# Patient Record
Sex: FEMALE | Race: BLACK OR AFRICAN AMERICAN | NOT HISPANIC OR LATINO | Employment: FULL TIME | URBAN - METROPOLITAN AREA
[De-identification: names, ages, dates, MRNs, and addresses within clinical notes are randomized per-mention and may not be internally consistent; named-entity substitution may affect disease eponyms.]

---

## 2023-09-23 DIAGNOSIS — I48.91 ATRIAL FIBRILLATION, UNSPECIFIED TYPE (MULTI): Primary | ICD-10-CM

## 2023-09-23 LAB
INR IN PPP BY COAGULATION ASSAY EXTERNAL: 1.4
PROTHROMBIN TIME (PT) IN PPP BY COAGULATION ASSAY EXTERNAL: NORMAL SECONDS

## 2023-10-07 ENCOUNTER — ANTICOAGULATION - WARFARIN VISIT (OUTPATIENT)
Dept: CARDIOLOGY | Facility: CLINIC | Age: 43
End: 2023-10-07
Payer: MEDICAID

## 2023-10-07 DIAGNOSIS — I48.91 ATRIAL FIBRILLATION, UNSPECIFIED TYPE (MULTI): ICD-10-CM

## 2023-10-07 LAB
POC INR: 2.9
POC PROTHROMBIN TIME: NORMAL

## 2023-10-07 PROCEDURE — 99211 OFF/OP EST MAY X REQ PHY/QHP: CPT | Performed by: INTERNAL MEDICINE

## 2023-10-07 PROCEDURE — 85610 PROTHROMBIN TIME: CPT | Mod: QW | Performed by: INTERNAL MEDICINE

## 2023-10-07 PROCEDURE — 85610 PROTHROMBIN TIME: CPT

## 2023-11-04 ENCOUNTER — ANTICOAGULATION - WARFARIN VISIT (OUTPATIENT)
Dept: CARDIOLOGY | Facility: CLINIC | Age: 43
End: 2023-11-04
Payer: MEDICAID

## 2023-11-04 DIAGNOSIS — I48.91 ATRIAL FIBRILLATION, UNSPECIFIED TYPE (MULTI): ICD-10-CM

## 2023-11-04 LAB
POC INR: 2.4
POC PROTHROMBIN TIME: NORMAL

## 2023-11-04 PROCEDURE — 85610 PROTHROMBIN TIME: CPT | Mod: QW | Performed by: INTERNAL MEDICINE

## 2023-11-04 PROCEDURE — 99211 OFF/OP EST MAY X REQ PHY/QHP: CPT | Performed by: INTERNAL MEDICINE

## 2023-11-04 NOTE — PROGRESS NOTES
Patient identification verified with 2 identifiers.    Location: [facility] at [facility] [address] [address] [phone] option #1     Referring Physician: Vamshi  Enrollment/ Re-enrollment date: 3/9/24   INR Goal: 2.0-3.0  INR monitoring is per [facility] protocol.  Anticoagulation Medication: warfarin  Indication: Atrial Fibrillation/Atrial Flutter    Subjective   Bleeding signs/symptoms: No    Bruising: No   Major bleeding event: No  Thrombosis signs/symptoms: No  Thromboembolic event: No  Missed doses: No  Extra doses: No  Medication changes: No  Dietary changes: No  Change in health: No  Change in activity: No  Alcohol: No  Other concerns: No    Upcoming Surgeries:  Does the Patient Have any upcoming surgeries that require interruption in anticoagulation therapy? no  Does the patient require bridging? no      Anticoagulation Summary  As of 2023      INR goal:  2.0-3.0   TTR:  100.0 % (1.1 mo)   INR used for dosin.40 (2023)   Weekly warfarin total:  40 mg               Assessment/Plan   Therapeutic     1. New dose: no change    2. Next INR: 1 month      Education provided to patient during the visit:  Patient instructed to call in interim with questions, concerns and changes.

## 2023-12-02 ENCOUNTER — ANTICOAGULATION - WARFARIN VISIT (OUTPATIENT)
Dept: CARDIOLOGY | Facility: CLINIC | Age: 43
End: 2023-12-02
Payer: MEDICAID

## 2023-12-02 DIAGNOSIS — I48.91 ATRIAL FIBRILLATION, UNSPECIFIED TYPE (MULTI): ICD-10-CM

## 2023-12-02 LAB
POC INR: 2.8
POC PROTHROMBIN TIME: NORMAL

## 2023-12-02 PROCEDURE — 99211 OFF/OP EST MAY X REQ PHY/QHP: CPT | Performed by: INTERNAL MEDICINE

## 2023-12-02 PROCEDURE — 85610 PROTHROMBIN TIME: CPT | Mod: QW

## 2023-12-02 NOTE — PROGRESS NOTES
Patient identification verified with 2 identifiers.    Location: [facility] at [facility] [address] [address] [phone] option #1     Referring Physician: Vamshi  Enrollment/ Re-enrollment date: 3/9/24   INR Goal: 2.0-3.0  INR monitoring is per [facility] protocol.  Anticoagulation Medication: warfarin  Indication: Atrial Fibrillation/Atrial Flutter    Subjective   Bleeding signs/symptoms: No    Bruising: No   Major bleeding event: No  Thrombosis signs/symptoms: No  Thromboembolic event: No  Missed doses: No  Extra doses: No  Medication changes: No  Dietary changes: No  Change in health: No  Change in activity: No  Alcohol: No  Other concerns: No    Upcoming Surgeries:  Does the Patient Have any upcoming surgeries that require interruption in anticoagulation therapy? no  Does the patient require bridging? no      Anticoagulation Summary  As of 2023      INR goal:  2.0-3.0   TTR:  100.0 % (2 mo)   INR used for dosin.80 (2023)   Weekly warfarin total:  40 mg               Assessment/Plan   Therapeutic     1. New dose: no change    2. Next INR: 1 month      Education provided to patient during the visit:  Patient instructed to call in interim with questions, concerns and changes.

## 2023-12-11 PROBLEM — B96.89 BV (BACTERIAL VAGINOSIS): Status: ACTIVE | Noted: 2023-12-11

## 2023-12-11 PROBLEM — R06.09 DYSPNEA ON EXERTION: Status: ACTIVE | Noted: 2022-02-19

## 2023-12-11 PROBLEM — N76.0 BV (BACTERIAL VAGINOSIS): Status: ACTIVE | Noted: 2023-12-11

## 2023-12-11 PROBLEM — R05.9 COUGH: Status: ACTIVE | Noted: 2022-02-19

## 2023-12-11 PROBLEM — M25.551 HIP PAIN, BILATERAL: Status: ACTIVE | Noted: 2023-12-11

## 2023-12-11 PROBLEM — I21.9 MI (MYOCARDIAL INFARCTION) (MULTI): Status: ACTIVE | Noted: 2023-12-11

## 2023-12-11 PROBLEM — E78.5 HYPERLIPIDEMIA: Status: ACTIVE | Noted: 2023-12-11

## 2023-12-11 PROBLEM — I25.2 HISTORY OF ST ELEVATION MYOCARDIAL INFARCTION (STEMI): Status: ACTIVE | Noted: 2023-12-11

## 2023-12-11 PROBLEM — A59.9 TRICHOMONIASIS: Status: ACTIVE | Noted: 2022-02-19

## 2023-12-11 PROBLEM — I47.20 VENTRICULAR TACHYCARDIA, PAROXYSMAL: Status: ACTIVE | Noted: 2023-12-11

## 2023-12-11 PROBLEM — J45.909 ASTHMA COMPLICATING PREGNANCY, ANTEPARTUM (HHS-HCC): Status: ACTIVE | Noted: 2023-12-11

## 2023-12-11 PROBLEM — E78.41 ELEVATED LIPOPROTEIN A LEVEL: Status: ACTIVE | Noted: 2023-12-11

## 2023-12-11 PROBLEM — I82.90 THROMBUS: Status: ACTIVE | Noted: 2022-02-19

## 2023-12-11 PROBLEM — M25.552 HIP PAIN, BILATERAL: Status: ACTIVE | Noted: 2023-12-11

## 2023-12-11 PROBLEM — K21.9 GASTROESOPHAGEAL REFLUX DISEASE: Status: ACTIVE | Noted: 2022-02-19

## 2023-12-11 PROBLEM — L73.2 HIDRADENITIS SUPPURATIVA: Status: ACTIVE | Noted: 2019-04-19

## 2023-12-11 PROBLEM — J30.9 CHRONIC ALLERGIC RHINITIS: Status: ACTIVE | Noted: 2023-12-11

## 2023-12-11 PROBLEM — J45.909 ASTHMA (HHS-HCC): Status: ACTIVE | Noted: 2023-12-11

## 2023-12-11 PROBLEM — Z95.810 CARDIAC DEFIBRILLATOR IN PLACE: Status: ACTIVE | Noted: 2023-12-11

## 2023-12-11 PROBLEM — I51.3 LEFT VENTRICULAR THROMBUS: Status: ACTIVE | Noted: 2023-12-11

## 2023-12-11 PROBLEM — I42.9 CARDIOMYOPATHY (MULTI): Chronic | Status: ACTIVE | Noted: 2022-02-19

## 2023-12-11 PROBLEM — I25.10 CAD (CORONARY ARTERY DISEASE): Status: ACTIVE | Noted: 2023-12-11

## 2023-12-11 PROBLEM — R06.83 SNORING: Status: ACTIVE | Noted: 2023-12-11

## 2023-12-11 PROBLEM — J20.9 ACUTE BRONCHITIS: Status: ACTIVE | Noted: 2023-12-11

## 2023-12-11 PROBLEM — R93.89 ABNORMAL COMPUTERIZED AXIAL TOMOGRAPHY OF CHEST: Status: ACTIVE | Noted: 2022-02-19

## 2023-12-11 PROBLEM — O99.519 ASTHMA COMPLICATING PREGNANCY, ANTEPARTUM (HHS-HCC): Status: ACTIVE | Noted: 2023-12-11

## 2023-12-11 PROBLEM — I47.29 VENTRICULAR TACHYCARDIA, PAROXYSMAL (MULTI): Status: ACTIVE | Noted: 2023-12-11

## 2023-12-11 PROBLEM — J18.9 PNEUMONIA: Status: ACTIVE | Noted: 2023-12-11

## 2023-12-11 PROBLEM — I50.22 CHRONIC SYSTOLIC CONGESTIVE HEART FAILURE (MULTI): Status: ACTIVE | Noted: 2023-12-11

## 2023-12-11 PROBLEM — N80.9 ENDOMETRIOSIS: Status: ACTIVE | Noted: 2022-02-19

## 2023-12-11 PROBLEM — I24.0: Status: ACTIVE | Noted: 2023-12-11

## 2023-12-11 PROBLEM — R06.02 SOB (SHORTNESS OF BREATH) ON EXERTION: Status: ACTIVE | Noted: 2023-12-11

## 2023-12-11 PROBLEM — M25.579 ANKLE PAIN: Status: ACTIVE | Noted: 2023-12-11

## 2023-12-11 PROBLEM — R07.9 CHEST PAIN: Status: ACTIVE | Noted: 2022-02-19

## 2023-12-11 PROBLEM — M54.9 BACK PAIN: Status: ACTIVE | Noted: 2022-02-19

## 2023-12-11 PROBLEM — L93.0 DISCOID LUPUS ERYTHEMATOSUS: Status: ACTIVE | Noted: 2019-04-19

## 2023-12-11 PROBLEM — N89.8 VAGINAL ITCHING: Status: ACTIVE | Noted: 2023-12-11

## 2023-12-11 PROBLEM — M25.559 ARTHRALGIA OF HIP: Status: ACTIVE | Noted: 2022-02-19

## 2023-12-11 PROBLEM — I31.9 PERICARDITIS (HHS-HCC): Status: ACTIVE | Noted: 2023-12-11

## 2023-12-11 PROBLEM — U07.1 DISEASE DUE TO SEVERE ACUTE RESPIRATORY SYNDROME CORONAVIRUS 2 (SARS-COV-2): Status: ACTIVE | Noted: 2022-02-19

## 2023-12-11 RX ORDER — DESIPRAMINE HYDROCHLORIDE 10 MG/1
10 TABLET ORAL DAILY
COMMUNITY

## 2023-12-11 RX ORDER — POTASSIUM CHLORIDE 750 MG/1
10 TABLET, FILM COATED, EXTENDED RELEASE ORAL AS NEEDED
COMMUNITY

## 2023-12-11 RX ORDER — METOPROLOL SUCCINATE 200 MG/1
200 TABLET, EXTENDED RELEASE ORAL DAILY
COMMUNITY

## 2023-12-11 RX ORDER — ALBUTEROL SULFATE 90 UG/1
AEROSOL, METERED RESPIRATORY (INHALATION) EVERY 6 HOURS PRN
COMMUNITY
Start: 2019-05-16

## 2023-12-11 RX ORDER — DAPAGLIFLOZIN 10 MG/1
10 TABLET, FILM COATED ORAL DAILY
COMMUNITY

## 2023-12-11 RX ORDER — CLINDAMYCIN PHOSPHATE 10 UG/ML
LOTION TOPICAL
COMMUNITY
Start: 2019-04-19

## 2023-12-11 RX ORDER — ROSUVASTATIN CALCIUM 40 MG/1
40 TABLET, COATED ORAL DAILY
COMMUNITY

## 2023-12-11 RX ORDER — WARFARIN 7.5 MG/1
7.5 TABLET ORAL
COMMUNITY

## 2023-12-11 RX ORDER — SPIRONOLACTONE 25 MG/1
0.5 TABLET ORAL DAILY
COMMUNITY
Start: 2020-11-25

## 2023-12-11 RX ORDER — FUROSEMIDE 40 MG/1
40 TABLET ORAL AS NEEDED
COMMUNITY

## 2023-12-11 RX ORDER — WARFARIN SODIUM 5 MG/1
TABLET ORAL
COMMUNITY

## 2023-12-11 RX ORDER — CLOPIDOGREL BISULFATE 75 MG/1
75 TABLET ORAL DAILY
COMMUNITY

## 2023-12-11 RX ORDER — PANTOPRAZOLE SODIUM 40 MG/1
40 TABLET, DELAYED RELEASE ORAL DAILY
COMMUNITY
End: 2024-05-21 | Stop reason: ALTCHOICE

## 2023-12-11 RX ORDER — SERTRALINE HYDROCHLORIDE 100 MG/1
100 TABLET, FILM COATED ORAL DAILY
COMMUNITY

## 2023-12-11 RX ORDER — SACUBITRIL AND VALSARTAN 97; 103 MG/1; MG/1
1 TABLET, FILM COATED ORAL 2 TIMES DAILY
COMMUNITY
Start: 2021-06-18 | End: 2024-04-17 | Stop reason: SDUPTHER

## 2023-12-11 RX ORDER — HYDROXYCHLOROQUINE SULFATE 200 MG/1
TABLET, FILM COATED ORAL 2 TIMES DAILY
COMMUNITY
Start: 2019-05-24 | End: 2024-05-21 | Stop reason: WASHOUT

## 2023-12-11 RX ORDER — MONTELUKAST SODIUM 10 MG/1
10 TABLET ORAL DAILY
COMMUNITY

## 2023-12-11 RX ORDER — DEXTROMETHORPHAN HYDROBROMIDE AND GUAIFENESIN 10; 200 MG/1; MG/1
CAPSULE, GELATIN COATED ORAL
COMMUNITY
Start: 2022-12-29 | End: 2023-12-12 | Stop reason: ALTCHOICE

## 2023-12-11 RX ORDER — GABAPENTIN 100 MG/1
100 CAPSULE ORAL 2 TIMES DAILY
COMMUNITY
Start: 2023-10-26

## 2023-12-11 RX ORDER — DULOXETIN HYDROCHLORIDE 30 MG/1
30 CAPSULE, DELAYED RELEASE ORAL 2 TIMES DAILY
COMMUNITY

## 2023-12-11 RX ORDER — BUDESONIDE AND FORMOTEROL FUMARATE DIHYDRATE 160; 4.5 UG/1; UG/1
AEROSOL RESPIRATORY (INHALATION) 2 TIMES DAILY
COMMUNITY
Start: 2020-05-07

## 2023-12-12 ENCOUNTER — OFFICE VISIT (OUTPATIENT)
Dept: CARDIOLOGY | Facility: HOSPITAL | Age: 43
End: 2023-12-12
Payer: MEDICAID

## 2023-12-12 VITALS
HEART RATE: 76 BPM | WEIGHT: 185 LBS | HEIGHT: 64 IN | BODY MASS INDEX: 31.58 KG/M2 | OXYGEN SATURATION: 96 % | SYSTOLIC BLOOD PRESSURE: 106 MMHG | DIASTOLIC BLOOD PRESSURE: 72 MMHG

## 2023-12-12 DIAGNOSIS — E78.00 PURE HYPERCHOLESTEROLEMIA: ICD-10-CM

## 2023-12-12 DIAGNOSIS — I50.22 CHRONIC SYSTOLIC CONGESTIVE HEART FAILURE (MULTI): ICD-10-CM

## 2023-12-12 DIAGNOSIS — I48.91 ATRIAL FIBRILLATION, UNSPECIFIED TYPE (MULTI): ICD-10-CM

## 2023-12-12 DIAGNOSIS — I21.02 ST ELEVATION MYOCARDIAL INFARCTION INVOLVING LEFT ANTERIOR DESCENDING (LAD) CORONARY ARTERY (MULTI): ICD-10-CM

## 2023-12-12 DIAGNOSIS — I42.9 CARDIOMYOPATHY, UNSPECIFIED TYPE (MULTI): Primary | Chronic | ICD-10-CM

## 2023-12-12 DIAGNOSIS — I24.0 CORONARY THROMBOSIS (MULTI): ICD-10-CM

## 2023-12-12 PROCEDURE — 3074F SYST BP LT 130 MM HG: CPT | Performed by: INTERNAL MEDICINE

## 2023-12-12 PROCEDURE — 99214 OFFICE O/P EST MOD 30 MIN: CPT | Performed by: INTERNAL MEDICINE

## 2023-12-12 PROCEDURE — 3044F HG A1C LEVEL LT 7.0%: CPT | Performed by: INTERNAL MEDICINE

## 2023-12-12 PROCEDURE — 1036F TOBACCO NON-USER: CPT | Performed by: INTERNAL MEDICINE

## 2023-12-12 PROCEDURE — 3078F DIAST BP <80 MM HG: CPT | Performed by: INTERNAL MEDICINE

## 2023-12-12 ASSESSMENT — ENCOUNTER SYMPTOMS
OCCASIONAL FEELINGS OF UNSTEADINESS: 0
LOSS OF SENSATION IN FEET: 0
DEPRESSION: 0

## 2023-12-12 NOTE — PATIENT INSTRUCTIONS
Check CMP, CBC, lipid profile, and A1c ordered by your primary physician.  Continue to exercise and take your medications.  Follow-up in 4 months with an echo.

## 2023-12-12 NOTE — PROGRESS NOTES
Primary Care Physician: Arminda Thompson MD  Date of Visit: 2023  3:40 PM EST  Location of visit: [facility]     Chief Complaint:   Chief Complaint   Patient presents with    Cardiomyopathy    Coronary Artery Disease    HxStemi        HPI / Summary:   Jayde Daugherty is a 43 y.o. female presents for followup.  She has no cardiac complaints.  She was exercising for 45 minutes 5 days a week without chest pain or shortness of breath.  She does note that she will get constant dull aching chest discomfort when she does not take her Cymbalta.  The discomfort is distinctly different than her prior angina.  She has been taking her Cymbalta regularly and she has not had any chest discomfort recently.  When she was exercising for 45 minutes a day she would not experience chest discomfort.  The patient denies chest pain, shortness of breath, palpitations, lightheadedness, syncope, orthopnea, paroxysmal nocturnal dyspnea, lower extremity edema, or bleeding problems.          Past Medical History:   Diagnosis Date    Encounter for full-term uncomplicated delivery      (spontaneous vaginal delivery)    Other conditions influencing health status     BMI (body mass index) 20.0-29.9    Other conditions influencing health status 06/10/2014    History of pregnancy    Other specified diseases and conditions complicating pregnancy 2019    Systemic lupus complicating pregnancy    Personal history of nicotine dependence 06/10/2019    Former cigarette smoker    Personal history of other diseases of the circulatory system 2014    Personal history of congestive heart failure    Personal history of other diseases of the circulatory system 2014    History of acute congestive heart failure    Personal history of other diseases of the circulatory system 2014    History of atrial fibrillation    Personal history of other diseases of the female genital tract     History of pelvic inflammatory  disease    Personal history of other diseases of the respiratory system 2022    History of acute bronchitis    Personal history of other specified conditions     History of abnormal Pap smear    Systemic lupus erythematosus, unspecified (CMS/[facility]) 2013    Systemic lupus erythematosus    Type A blood, Rh positive     Blood type A+        Past Surgical History:   Procedure Laterality Date    CERVICAL BIOPSY  W/ LOOP ELECTRODE EXCISION  2014    Cervical Loop Electrosurgical Excision (LEEP)     SECTION, CLASSIC  2014     Section    HYSTERECTOMY  2016    Hysterectomy    MR HEAD ANGIO WO IV CONTRAST  2013    MR HEAD ANGIO WO IV CONTRAST 2013 [facility]    MR NECK ANGIO WO IV CONTRAST  2013    MR NECK ANGIO WO IV CONTRAST 2013 [facility]    OTHER SURGICAL HISTORY  2019    Cardioverter defibrillator insertion    TOTAL HIP ARTHROPLASTY  2014    Hip Replacement          Social History:   reports that she quit smoking about 7 years ago. Her smoking use included cigarettes. She has a 25.00 pack-year smoking history. She has never used smokeless tobacco. She reports current alcohol use of about 3.0 standard drinks of alcohol per week. She reports that she does not currently use drugs.     Family History:  family history is not on file.      Allergies:  No Known Allergies    Outpatient Medications:  Current Outpatient Medications   Medication Instructions    albuterol 90 mcg/actuation inhaler inhalation, Every 6 hours PRN    budesonide-formoteroL (Symbicort) 160-4.5 mcg/actuation inhaler inhalation, 2 times daily    clindamycin (Cleocin T) 1 % lotion 1 Application    clopidogrel (PLAVIX) 75 mg, oral, Daily    desipramine (NORPRAMIN) 10 mg, oral, Daily    DULoxetine (CYMBALTA) 30 mg, oral, 2 times daily    Farxiga 10 mg, oral, Daily    furosemide (LASIX) 40 mg, oral, Daily    gabapentin (NEURONTIN) 100 mg, oral, 2 times daily     "hydroxychloroquine (Plaquenil) 200 mg tablet oral, 2 times daily    insulin NPH, Isophane, (HumuLIN N,NovoLIN N) 100 unit/mL injection subcutaneous    metoprolol succinate XL (TOPROL-XL) 200 mg, oral, Daily    montelukast (SINGULAIR) 10 mg, oral, Daily    pantoprazole (PROTONIX) 40 mg, oral, Daily    potassium chloride CR 10 mEq ER tablet oral    rosuvastatin (CRESTOR) 40 mg, oral, Daily    sacubitriL-valsartan (Entresto)  mg tablet 1 tablet, oral, 2 times daily    sertraline (ZOLOFT) 100 mg, oral, Daily    spironolactone (Aldactone) 25 mg tablet oral    warfarin (Coumadin) 5 mg tablet TAKE 1 TABLET BY MOUTH ONCE DAILY EXCEPT TAKE 1 AND 1/2 TABLETS BY MOUTH ON MONDAY    warfarin (COUMADIN) 7.5 mg, oral       Physical Exam:  Vitals:    12/12/23 1622   BP: 106/72   BP Location: Right arm   Pulse: 76   SpO2: 96%   Weight: 83.9 kg (185 lb)   Height: 1.626 m (5' 4\")     Wt Readings from Last 5 Encounters:   12/12/23 83.9 kg (185 lb)   05/24/23 83 kg (183 lb)   02/22/23 82.1 kg (181 lb)   01/18/23 83 kg (183 lb)   01/04/23 82.6 kg (182 lb 0.8 oz)     Body mass index is 31.76 kg/m².   General: Well-developed well-nourished in no acute distress  HEENT: Normocephalic atraumatic  Neck: Supple, JVP is normal negative hepatojugular reflux 2+ carotid pulses without bruit  Pulmonary: Normal respiratory effort, clear to auscultation  Cardiovascular: No right ventricular heave, normal S1 and S2, no murmurs rubs or gallops  Abdomen: Soft nontender nondistended  Extremities: Warm without edema 2+ radial pulses bilaterally   Neurologic: Alert and oriented x3  Psychiatric: Normal mood and affect     Last Labs:  CMP:  Recent Labs     02/14/23  1113 02/07/23  1023 01/09/23  0743    136 136   K 3.8 3.6 4.2    101 106   CO2 27 26 25   ANIONGAP 13 13 9*   BUN 8 12 8   CREATININE 0.97 0.78 0.74   GLUCOSE 153* 91 109*     Recent Labs     02/14/23  1113 02/07/23  1023 01/05/23  0603   ALBUMIN 3.6 4.0 3.6   ALKPHOS 40 38 44 "   ALT 21 16 20   AST 14 13 17   BILITOT 0.4 0.7 0.5     CBC:  Recent Labs     02/15/23  0506 02/14/23  1113 02/07/23  1023   WBC 5.0 4.8 3.3*   HGB 11.7* 13.5 13.8   HCT 36.5 40.9 43.2    230 185   MCV 93 91 92     COAG:   Recent Labs     12/02/23  1430 11/04/23  1455 09/23/23  0000 02/15/23  1057 01/05/23  0603 01/04/23  1907   INR 2.80 2.40   < >  --    < > 2.4*   DDIMERVTE  --   --   --  459  --  484    < > = values in this interval not displayed.     HEME/ENDO:  Recent Labs     02/15/23  0506 09/29/22  1628 12/21/21  1137 12/17/21  1539 12/22/20  1500   FERRITIN  --   --   --  321*  --    TSH  --  0.82 1.55  --  1.41   HGBA1C 6.5* 6.2* 9.0*  --  6.0      CARDIAC:   Recent Labs     02/14/23  1254 02/14/23  1113 01/06/23  1622 01/05/23  0603 01/04/23  1907   TROPHS 8 8  --  25* 32*   BNP  --  170* 266*  --  594*     Recent Labs     02/15/23  0506 09/29/22  1628 09/19/22  1013   CHOL 170 276* 204*   LDLF 97 174* 128*   HDL 47.6 68.3 58.8   TRIG 129 170* 88       Last Cardiology Tests:  ECG:  An electrocardiogram performed today that I reviewed shows normal sinus rhythm left anterior fascicular block prior anterior lateral infarct ST-T abnormality consider inferior lateral ischemia.    Echo:  January 4, 2023  CONCLUSIONS:   1. Left ventricular systolic function is severely decreased with a 20% estimated ejection fraction.   2. Spectral Doppler shows an abnormal pattern of left ventricular diastolic filling.   3. The left atrium is severely dilated.   4. Mild to moderate mitral valve regurgitation.   5. Mildly elevated RVSP.   6. Left ventricular cavity size is moderately dilated.   7. There is global hypokinesis of the left ventricle with minor regional variations.       Cath:  Right heart catheterization January 6, 2023  CONCLUSIONS:   1. Normal left and right heart filling pressures.   2. No evidence of pulmonary hypertension.   3. Low normal cardiac index with elevated SVR (1498).   4. No evidence of  intracardiac shunt.    Cardiac catheterization September 19, 2022  CONCLUSIONS:   1. No significant CAD in a codominant system.    Cardiac catheterization June 2, 2019  Coronary Lesion Summary:  Vessel   Stenosis    Vessel Segment  LAD    100% stenosis proximal to mid  CONCLUSIONS:   1. Successful OCT guided PCI of the distal left main and proximal LAD with mechanical thrombectomy.   2. 100% thormbotic occlusion of proximal-mid LAD with large thrombus burden in distal LM and proximal LAD in a codominant system.   3. Likely embolic vs. insitu thrombosis with otherwise angiographically normal coronary arteries.   4. Elevated LVEDP.   5. No aortic stenosis.   6. Left Ventricular end-diastolic pressure = 35.    Stress Test:  Stress Results:  No results found for this or any previous visit from the past 365 days.         Cardiac Imaging:  CT angio of the chest January 4, 2023  FINDINGS:     Pulmonary arteries are adequately opacified without acute or chronic  filling defects.       The heart is normal in size without pericardial effusion.       Thoracic lymph nodes are not enlarged.     There is no pleural effusion, pleural thickening, or pneumothorax.           Assessment/Plan   Diagnoses and all orders for this visit:  Cardiomyopathy, unspecified type (CMS/HCC)  -     Transthoracic echo (TTE) complete; Future  -     perflutren lipid microspheres (Definity) injection 0.5-10 mL of dilution  -     sulfur hexafluoride microsphr (Lumason) injection 24.28 mg  -     perflutren protein A microsphere (Optison) injection 0.5 mL  Atrial fibrillation, unspecified type (CMS/HCC)  Coronary thrombosis (CMS/HCC)  Chronic systolic congestive heart failure (CMS/HCC)  -     Transthoracic echo (TTE) complete; Future  -     perflutren lipid microspheres (Definity) injection 0.5-10 mL of dilution  -     sulfur hexafluoride microsphr (Lumason) injection 24.28 mg  -     perflutren protein A microsphere (Optison) injection 0.5 mL  Pure  hypercholesterolemia  ST elevation myocardial infarction involving left anterior descending (LAD) coronary artery (CMS/HCC)    In summary Ms. Gm Martinez is a pleasant 43 year-old -American female with a past medical history significant for anterior ST elevation myocardial infarction in the setting of massive thrombus (embolic versus in situ thrombosis) involving the distal left main and LAD with residual ischemic cardiomyopathy ejection fraction 30-35% status post ICD, paroxysmal atrial fibrillation, hypertension, hyperlipidemia, type II insulin-requiring diabetes while on steroids, lupus, LV thrombus on Warfarin, and prior history of recurrent peripartum cardiomyopathy.  She is relatively asymptomatic.  She does note sporadic chest discomfort that is noncardiac in nature when she has been off of Cymbalta.  She appears euvolemic on exam.  She should continue her current cardiovascular medications.  She will get labs today ordered by her primary physician.  We will see her back in follow-up in 4 months with an echo for surveillance of her LV function.      Orders:  Orders Placed This Encounter   Procedures    Transthoracic echo (TTE) complete      Followup Appts:  Future Appointments   Date Time Provider Department Center   12/30/2023  2:15 PM [id number] [address] [id number]   4/8/2024  4:15 PM Essie White DPM [id number]           ____________________________________________________________  Migue Bonds MD  [facility] [place]  [facility]

## 2023-12-30 ENCOUNTER — ANTICOAGULATION - WARFARIN VISIT (OUTPATIENT)
Dept: CARDIOLOGY | Facility: CLINIC | Age: 43
End: 2023-12-30
Payer: MEDICAID

## 2023-12-30 DIAGNOSIS — I48.91 ATRIAL FIBRILLATION, UNSPECIFIED TYPE (MULTI): ICD-10-CM

## 2023-12-30 LAB
POC INR: 3.2
POC PROTHROMBIN TIME: NORMAL

## 2023-12-30 PROCEDURE — 85610 PROTHROMBIN TIME: CPT | Mod: QW

## 2023-12-30 PROCEDURE — 99211 OFF/OP EST MAY X REQ PHY/QHP: CPT | Performed by: INTERNAL MEDICINE

## 2023-12-30 NOTE — PROGRESS NOTES
Patient identification verified with 2 identifiers.    Location: [facility] at [facility] - [address] [address] [phone] option #1     Referring Physician: Vamshi  Enrollment/ Re-enrollment date: 3/9/24   INR Goal: 2.0-3.0  INR monitoring is per [facility] protocol.  Anticoagulation Medication: warfarin  Indication: Atrial Fibrillation/Atrial Flutter    Subjective   Bleeding signs/symptoms: No    Bruising: No   Major bleeding event: No  Thrombosis signs/symptoms: No  Thromboembolic event: No  Missed doses: Yes  Extra doses: No  Medication changes: No  Dietary changes: No  Change in health: No  Change in activity: No  Alcohol: No  Other concerns: No    Upcoming Surgeries:  Does the Patient Have any upcoming surgeries that require interruption in anticoagulation therapy? no  Does the patient require bridging? no      Anticoagulation Summary  As of 12/30/2023      INR goal:  2.0-3.0   TTR:  84.2 % (2.9 mo)   INR used for dosing:  3.20 (12/30/2023)   Weekly warfarin total:  40 mg               Assessment/Plan   Supra therapeutic    1. New dose: no change  as pt. Missed last night's dose of warfarin  2. Next INR: 2 weeks as patient needs a Saturday appointment      Education provided to patient during the visit:  Patient instructed to call in interim with questions, concerns and changes.

## 2024-01-13 ENCOUNTER — ANTICOAGULATION - WARFARIN VISIT (OUTPATIENT)
Dept: CARDIOLOGY | Facility: CLINIC | Age: 44
End: 2024-01-13
Payer: MEDICAID

## 2024-01-13 DIAGNOSIS — I48.91 ATRIAL FIBRILLATION, UNSPECIFIED TYPE (MULTI): ICD-10-CM

## 2024-01-13 LAB
POC INR: 2
POC PROTHROMBIN TIME: NORMAL

## 2024-01-13 PROCEDURE — 85610 PROTHROMBIN TIME: CPT | Mod: QW

## 2024-01-13 PROCEDURE — 99211 OFF/OP EST MAY X REQ PHY/QHP: CPT | Performed by: INTERNAL MEDICINE

## 2024-01-13 NOTE — PROGRESS NOTES
Patient identification verified with 2 identifiers.    Location: [facility] [address] [address] [phone] option #1     Referring Physician: Vamshi  Enrollment/ Re-enrollment date: 3/9/24   INR Goal: 2.0-3.0  INR monitoring is per [facility] protocol.  Anticoagulation Medication: warfarin  Indication: Atrial Fibrillation/Atrial Flutter    Subjective   Bleeding signs/symptoms: No    Bruising: No   Major bleeding event: No  Thrombosis signs/symptoms: No  Thromboembolic event: No  Missed doses: No  Extra doses: No  Medication changes: No  Dietary changes: No  Change in health: No  Change in activity: No  Alcohol: No  Other concerns: No    Upcoming Surgeries:  Does the Patient Have any upcoming surgeries that require interruption in anticoagulation therapy? no  Does the patient require bridging? no      Anticoagulation Summary  As of 2024      INR goal:  2.0-3.0   TTR:  84.1 % (3.4 mo)   INR used for dosin.00 (2024)   Weekly warfarin total:  40 mg               Assessment/Plan   therapeutic    1. New dose: no change   2. Next INR: 4 weeks as patient needs a Saturday appointment      Education provided to patient during the visit:  Patient instructed to call in interim with questions, concerns and changes.

## 2024-02-07 ENCOUNTER — HOSPITAL ENCOUNTER (OUTPATIENT)
Dept: RADIOLOGY | Facility: CLINIC | Age: 44
Discharge: HOME | End: 2024-02-07
Payer: MEDICAID

## 2024-02-07 DIAGNOSIS — Z12.31 SCREENING MAMMOGRAM FOR BREAST CANCER: ICD-10-CM

## 2024-02-07 PROCEDURE — 77063 BREAST TOMOSYNTHESIS BI: CPT | Performed by: RADIOLOGY

## 2024-02-07 PROCEDURE — 77067 SCR MAMMO BI INCL CAD: CPT

## 2024-02-07 PROCEDURE — 77067 SCR MAMMO BI INCL CAD: CPT | Performed by: RADIOLOGY

## 2024-02-10 ENCOUNTER — ANTICOAGULATION - WARFARIN VISIT (OUTPATIENT)
Dept: CARDIOLOGY | Facility: CLINIC | Age: 44
End: 2024-02-10
Payer: MEDICAID

## 2024-02-10 ENCOUNTER — TELEPHONE (OUTPATIENT)
Dept: CARDIOLOGY | Facility: CLINIC | Age: 44
End: 2024-02-10

## 2024-02-10 DIAGNOSIS — I48.91 ATRIAL FIBRILLATION, UNSPECIFIED TYPE (MULTI): ICD-10-CM

## 2024-02-13 ENCOUNTER — ANTICOAGULATION - WARFARIN VISIT (OUTPATIENT)
Dept: CARDIOLOGY | Facility: CLINIC | Age: 44
End: 2024-02-13
Payer: MEDICAID

## 2024-02-13 DIAGNOSIS — I48.91 ATRIAL FIBRILLATION, UNSPECIFIED TYPE (MULTI): ICD-10-CM

## 2024-02-24 ENCOUNTER — ANTICOAGULATION - WARFARIN VISIT (OUTPATIENT)
Dept: CARDIOLOGY | Facility: CLINIC | Age: 44
End: 2024-02-24
Payer: MEDICAID

## 2024-02-24 DIAGNOSIS — I48.0 PAROXYSMAL ATRIAL FIBRILLATION (MULTI): Primary | ICD-10-CM

## 2024-02-24 DIAGNOSIS — I48.91 ATRIAL FIBRILLATION, UNSPECIFIED TYPE (MULTI): ICD-10-CM

## 2024-02-24 LAB
POC INR: 2.8
POC PROTHROMBIN TIME: NORMAL

## 2024-02-24 PROCEDURE — 85610 PROTHROMBIN TIME: CPT | Mod: QW

## 2024-02-24 PROCEDURE — 99211 OFF/OP EST MAY X REQ PHY/QHP: CPT | Performed by: INTERNAL MEDICINE

## 2024-02-24 NOTE — PROGRESS NOTES
Patient identification verified with 2 identifiers.    Location: [facility] [address] [address] [phone] option #1     Referring Physician: Vamshi  Enrollment/ Re-enrollment date: 3/9/24   INR Goal: 2.0-3.0  INR monitoring is per [facility] protocol.  Anticoagulation Medication: warfarin  Indication: Atrial Fibrillation/Atrial Flutter    Subjective   Bleeding signs/symptoms: No    Bruising: No   Major bleeding event: No  Thrombosis signs/symptoms: No  Thromboembolic event: No  Missed doses: No  Extra doses: No  Medication changes: No  Dietary changes: No  Change in health: No  Change in activity: No  Alcohol: No  Other concerns: No    Upcoming Surgeries:  Does the Patient Have any upcoming surgeries that require interruption in anticoagulation therapy? no  Does the patient require bridging? no      Anticoagulation Summary  As of 2024      INR goal:  2.0-3.0   TTR:  88.7 % (4.8 mo)   INR used for dosin.80 (2024)   Weekly warfarin total:  40 mg               Assessment/Plan   therapeutic    1. New dose: no change   2. Next INR: 4 weeks as patient needs a Saturday appointment      Education provided to patient during the visit:  Patient instructed to call in interim with questions, concerns and changes.

## 2024-02-26 PROBLEM — I48.0 PAROXYSMAL ATRIAL FIBRILLATION (MULTI): Status: ACTIVE | Noted: 2024-02-26

## 2024-03-23 ENCOUNTER — ANTICOAGULATION - WARFARIN VISIT (OUTPATIENT)
Dept: CARDIOLOGY | Facility: CLINIC | Age: 44
End: 2024-03-23
Payer: MEDICAID

## 2024-03-23 DIAGNOSIS — I48.0 PAROXYSMAL ATRIAL FIBRILLATION (MULTI): ICD-10-CM

## 2024-03-23 LAB
POC INR: 5.4
POC PROTHROMBIN TIME: NORMAL

## 2024-03-23 PROCEDURE — 99211 OFF/OP EST MAY X REQ PHY/QHP: CPT

## 2024-03-23 PROCEDURE — 85610 PROTHROMBIN TIME: CPT | Mod: QW

## 2024-03-23 NOTE — PROGRESS NOTES
Patient identification verified with 2 identifiers.    Location: [facility] at [facility] - [address] [address] [phone] option #1     Referring Physician: DR.WILLIAM MCDANIEL  Enrollment/ Re-enrollment date: 2025   INR Goal: 2.0-3.0  INR monitoring is per [facility] protocol.  Anticoagulation Medication: warfarin  Indication: Atrial Fibrillation/Atrial Flutter    Subjective   Bleeding signs/symptoms: No  PT STATES SHE HAD A NOSE BLEED THAT SHE WAS ABLE TO STOP.    Bruising: No   Major bleeding event: No  Thrombosis signs/symptoms: No  Thromboembolic event: No  Missed doses: No  Extra doses: No  Medication changes: No  Dietary changes: Yes  PT ATE SOME CRANBERRIES IN TRAIL MIX  Change in health: No  Change in activity: No  Alcohol: No  Other concerns: No    Upcoming Procedures:  Does the Patient Have any upcoming procedures that require interruption in anticoagulation therapy? no  Does the patient require bridging? no      Anticoagulation Summary  As of 3/23/2024      INR goal:  2.0-3.0   TTR:  75.8 % (5.7 mo)   INR used for dosin.40 (3/23/2024)   Weekly warfarin total:  40 mg               Assessment/Plan   Supratherapeutic     1. New dose:      PT HAS BEEN EATING CRANBERRIES IN TRAIL MIX. SHE WILL STOP. PT ALSO REPORTS A NOSEBLEED WHICH SHE WAS ABLE TO STOP. PT HAS BEEN ON THIS DOSE OF WARFARIN FOR MULTIPLE MONTHS AND MOSTLY THERAPEUTIC. CONTACTED DR. MCDANIEL, WITH PLAN TO HOLD WARFARIN X 2 DAYS REQUESTED HE CONTACT PT IF HE WOULD LIKE A DIFFERENT POC. PT WILL EAT SOME VIT K FOR THE NEXT 2 DAYS. PT IS UNABLE TO RTC UNTIL 3/29/24 DUE TO HER WORK SCHEDULE. PT ADVISED TO GO TO ER IF SHE HAS ANY FURTHER BLEEDING ISSUES. PT ALSO REPORTED HAVING SOME LIGHTHEADEDNESS WHICH I ALSO ADVISED HER TO CONTACT DR. MCDANIEL OR GO TO ER IF IT CONTINUES. PT VERBALIZED UNDERSTANDING OF ALL THE ABOVE.  2. Next INR:  3/29/24      Education provided to patient during the  visit:  Patient instructed to call in interim with questions, concerns and changes.   Patient educated on dietary consistency in vitamin k consumption.

## 2024-03-25 ENCOUNTER — TELEPHONE (OUTPATIENT)
Dept: CARDIOLOGY | Facility: CLINIC | Age: 44
End: 2024-03-25
Payer: MEDICAID

## 2024-03-29 ENCOUNTER — ANTICOAGULATION - WARFARIN VISIT (OUTPATIENT)
Dept: CARDIOLOGY | Facility: CLINIC | Age: 44
End: 2024-03-29
Payer: MEDICAID

## 2024-03-29 DIAGNOSIS — I48.0 PAROXYSMAL ATRIAL FIBRILLATION (MULTI): ICD-10-CM

## 2024-03-29 LAB
POC INR: 5
POC PROTHROMBIN TIME: NORMAL

## 2024-03-29 PROCEDURE — 85610 PROTHROMBIN TIME: CPT | Mod: QW

## 2024-03-29 PROCEDURE — 99211 OFF/OP EST MAY X REQ PHY/QHP: CPT

## 2024-03-29 NOTE — PROGRESS NOTES
Patient identification verified with 2 identifiers.    Location: [facility] - [address] [address] [phone] option #1     Referring Physician: DR. BRIDGETTE MCDANIEL  Enrollment/ Re-enrollment date: 2025   INR Goal: 2.0-3.0  INR monitoring is per [facility] protocol.  Anticoagulation Medication: warfarin  Indication: Atrial Fibrillation/Atrial Flutter    Subjective   Bleeding signs/symptoms: No    Bruising: No   Major bleeding event: No  Thrombosis signs/symptoms: No  Thromboembolic event: No  Missed doses: No  Extra doses: No  Medication changes: No  Dietary changes: No  Change in health: No  Change in activity: No  Alcohol: No  Other concerns: No    Upcoming Procedures:  Does the Patient Have any upcoming procedures that require interruption in anticoagulation therapy? no  Does the patient require bridging? no      Anticoagulation Summary  As of 3/29/2024      INR goal:  2.0-3.0   TTR:  73.2 % (5.9 mo)   INR used for dosin.00 (3/29/2024)   Weekly warfarin total:  40 mg               Assessment/Plan   Supratherapeutic     1. New dose: PAGED DR. MCDANIEL. HE DID NOT RESPOND AND IS OFFLINE. SENT SECURE CHAT TO ADIEL ALFONSO WITH NO RESPONSE. CALLED DR. MCDANIEL'S OFFICE HAD TO LEAVE A MESSAGE WITH POC AND REQUESTING A RETURN CALL.   2. Next INR:  24      Education provided to patient during the visit:  Patient instructed to call in interim with questions, concerns and changes.   Patient educated on interactions between medications and warfarin.   Patient educated on dietary consistency in vitamin k consumption.   Patient educated on affects of alcohol consumption while taking warfarin.   Patient educated on signs of bleeding/clotting.   Patient educated on compliance with dosing, follow up appointments, and prescribed plan of care.

## 2024-04-01 ENCOUNTER — ANTICOAGULATION - WARFARIN VISIT (OUTPATIENT)
Dept: CARDIOLOGY | Facility: CLINIC | Age: 44
End: 2024-04-01
Payer: MEDICAID

## 2024-04-01 DIAGNOSIS — I48.0 PAROXYSMAL ATRIAL FIBRILLATION (MULTI): ICD-10-CM

## 2024-04-01 LAB
POC INR: 1.9
POC PROTHROMBIN TIME: NORMAL

## 2024-04-01 PROCEDURE — 85610 PROTHROMBIN TIME: CPT | Mod: QW

## 2024-04-01 PROCEDURE — 99211 OFF/OP EST MAY X REQ PHY/QHP: CPT

## 2024-04-01 NOTE — PROGRESS NOTES
Patient identification verified with 2 identifiers.    Location: [facility] at [facility] - [address] [address] [phone] option #1     Referring Physician: BRIDGETTE MCDANIEL  Enrollment/ Re-enrollment date: 2025   INR Goal: 2.0-3.0  INR monitoring is per [facility] protocol.  Anticoagulation Medication: warfarin  Indication: Atrial Fibrillation/Atrial Flutter    Subjective   Bleeding signs/symptoms: No  DENIES SIGNS OF BLEEDING    Bruising: No   Major bleeding event: No  Thrombosis signs/symptoms: No  Thromboembolic event: No  Missed doses: No  Extra doses: No  Medication changes: No  Dietary changes: No  Change in health: No  Change in activity: No  Alcohol: No  Other concerns: No    Upcoming Procedures:  Does the Patient Have any upcoming procedures that require interruption in anticoagulation therapy? no  Does the patient require bridging? no      Anticoagulation Summary  As of 2024      INR goal:  2.0-3.0   TTR:  72.4 % (6 mo)   INR used for dosin.90 (2024)   Weekly warfarin total:  40 mg               Assessment/Plan   Subtherapeutic     1. New dose: no change  HELD FOR 3 DAYS AFTER EATING CRANBERRIES  2. Next INR: 1 week      Education provided to patient during the visit:  Patient instructed to call in interim with questions, concerns and changes.   Patient educated on interactions between medications and warfarin.   Patient educated on dietary consistency in vitamin k consumption.   Patient educated on affects of alcohol consumption while taking warfarin.   Patient educated on signs of bleeding/clotting.   Patient educated on compliance with dosing, follow up appointments, and prescribed plan of care.

## 2024-04-02 ENCOUNTER — LAB (OUTPATIENT)
Dept: LAB | Facility: LAB | Age: 44
End: 2024-04-02
Payer: MEDICAID

## 2024-04-02 DIAGNOSIS — I50.22 CHRONIC SYSTOLIC (CONGESTIVE) HEART FAILURE (MULTI): Primary | ICD-10-CM

## 2024-04-02 DIAGNOSIS — E78.2 MIXED HYPERLIPIDEMIA: ICD-10-CM

## 2024-04-02 LAB
APPEARANCE UR: CLEAR
BILIRUB UR STRIP.AUTO-MCNC: NEGATIVE MG/DL
COLOR UR: ABNORMAL
GLUCOSE UR STRIP.AUTO-MCNC: ABNORMAL MG/DL
KETONES UR STRIP.AUTO-MCNC: NEGATIVE MG/DL
LEUKOCYTE ESTERASE UR QL STRIP.AUTO: NEGATIVE
NITRITE UR QL STRIP.AUTO: NEGATIVE
PH UR STRIP.AUTO: 6 [PH]
PROT UR STRIP.AUTO-MCNC: NEGATIVE MG/DL
RBC # UR STRIP.AUTO: ABNORMAL /UL
RBC #/AREA URNS AUTO: ABNORMAL /HPF
SP GR UR STRIP.AUTO: 1.01
UROBILINOGEN UR STRIP.AUTO-MCNC: NORMAL MG/DL
WBC #/AREA URNS AUTO: ABNORMAL /HPF

## 2024-04-02 PROCEDURE — 36415 COLL VENOUS BLD VENIPUNCTURE: CPT

## 2024-04-02 PROCEDURE — 83036 HEMOGLOBIN GLYCOSYLATED A1C: CPT

## 2024-04-02 PROCEDURE — 80053 COMPREHEN METABOLIC PANEL: CPT

## 2024-04-02 PROCEDURE — 85027 COMPLETE CBC AUTOMATED: CPT

## 2024-04-02 PROCEDURE — 84443 ASSAY THYROID STIM HORMONE: CPT

## 2024-04-02 PROCEDURE — 80061 LIPID PANEL: CPT

## 2024-04-02 PROCEDURE — 81001 URINALYSIS AUTO W/SCOPE: CPT

## 2024-04-03 LAB
ALBUMIN SERPL BCP-MCNC: 3.9 G/DL (ref 3.4–5)
ALP SERPL-CCNC: 48 U/L (ref 33–110)
ALT SERPL W P-5'-P-CCNC: 15 U/L (ref 7–45)
ANION GAP SERPL CALC-SCNC: 15 MMOL/L (ref 10–20)
AST SERPL W P-5'-P-CCNC: 13 U/L (ref 9–39)
BILIRUB SERPL-MCNC: 0.7 MG/DL (ref 0–1.2)
BUN SERPL-MCNC: 10 MG/DL (ref 6–23)
CALCIUM SERPL-MCNC: 9.4 MG/DL (ref 8.6–10.6)
CHLORIDE SERPL-SCNC: 101 MMOL/L (ref 98–107)
CHOLEST SERPL-MCNC: 259 MG/DL (ref 0–199)
CHOLESTEROL/HDL RATIO: 4.6
CO2 SERPL-SCNC: 27 MMOL/L (ref 21–32)
CREAT SERPL-MCNC: 0.95 MG/DL (ref 0.5–1.05)
EGFRCR SERPLBLD CKD-EPI 2021: 77 ML/MIN/1.73M*2
ERYTHROCYTE [DISTWIDTH] IN BLOOD BY AUTOMATED COUNT: 12.1 % (ref 11.5–14.5)
EST. AVERAGE GLUCOSE BLD GHB EST-MCNC: 192 MG/DL
GLUCOSE SERPL-MCNC: 153 MG/DL (ref 74–99)
HBA1C MFR BLD: 8.3 %
HCT VFR BLD AUTO: 45.2 % (ref 36–46)
HDLC SERPL-MCNC: 56.9 MG/DL
HGB BLD-MCNC: 14.6 G/DL (ref 12–16)
LDLC SERPL CALC-MCNC: 161 MG/DL
MCH RBC QN AUTO: 29.5 PG (ref 26–34)
MCHC RBC AUTO-ENTMCNC: 32.3 G/DL (ref 32–36)
MCV RBC AUTO: 91 FL (ref 80–100)
NON HDL CHOLESTEROL: 202 MG/DL (ref 0–149)
NRBC BLD-RTO: 0 /100 WBCS (ref 0–0)
PLATELET # BLD AUTO: 238 X10*3/UL (ref 150–450)
POTASSIUM SERPL-SCNC: 4 MMOL/L (ref 3.5–5.3)
PROT SERPL-MCNC: 7.5 G/DL (ref 6.4–8.2)
RBC # BLD AUTO: 4.95 X10*6/UL (ref 4–5.2)
SODIUM SERPL-SCNC: 139 MMOL/L (ref 136–145)
TRIGL SERPL-MCNC: 204 MG/DL (ref 0–149)
TSH SERPL-ACNC: 1.77 MIU/L (ref 0.44–3.98)
VLDL: 41 MG/DL (ref 0–40)
WBC # BLD AUTO: 3.8 X10*3/UL (ref 4.4–11.3)

## 2024-04-06 ENCOUNTER — APPOINTMENT (OUTPATIENT)
Dept: CARDIOLOGY | Facility: CLINIC | Age: 44
End: 2024-04-06
Payer: MEDICAID

## 2024-04-06 ENCOUNTER — ANTICOAGULATION - WARFARIN VISIT (OUTPATIENT)
Dept: CARDIOLOGY | Facility: CLINIC | Age: 44
End: 2024-04-06
Payer: MEDICAID

## 2024-04-06 DIAGNOSIS — I48.0 PAROXYSMAL ATRIAL FIBRILLATION (MULTI): Primary | ICD-10-CM

## 2024-04-08 ENCOUNTER — APPOINTMENT (OUTPATIENT)
Dept: PODIATRY | Facility: CLINIC | Age: 44
End: 2024-04-08
Payer: MEDICAID

## 2024-04-11 ENCOUNTER — ANTICOAGULATION - WARFARIN VISIT (OUTPATIENT)
Dept: CARDIOLOGY | Facility: CLINIC | Age: 44
End: 2024-04-11
Payer: MEDICAID

## 2024-04-11 DIAGNOSIS — I48.0 PAROXYSMAL ATRIAL FIBRILLATION (MULTI): Primary | ICD-10-CM

## 2024-04-11 LAB
POC INR: 8
POC PROTHROMBIN TIME: NORMAL

## 2024-04-11 PROCEDURE — 99211 OFF/OP EST MAY X REQ PHY/QHP: CPT

## 2024-04-11 PROCEDURE — 85610 PROTHROMBIN TIME: CPT | Mod: QW

## 2024-04-11 NOTE — PROGRESS NOTES
Patient identification verified with 2 identifiers.    Location: [facility] at [facility] - [address] [address] [phone] option #1     Referring Physician: Dr Migue Bonds   Enrollment/ Re-enrollment date: 2025   INR Goal: 2.0-3.0  INR monitoring is per [facility] protocol.  Anticoagulation Medication: warfarin  Indication: Atrial Fibrillation/Atrial Flutter    Subjective   Bleeding signs/symptoms: No    Bruising: No   Major bleeding event: No  Thrombosis signs/symptoms: No  Thromboembolic event: No  Missed doses: No  Extra doses: No  Medication changes: No  Dietary changes: No  Change in health: No  Change in activity: No  Alcohol: No  Other concerns: No    Upcoming Procedures:  Does the Patient Have any upcoming procedures that require interruption in anticoagulation therapy? no  Does the patient require bridging? no      Anticoagulation Summary  As of 2024      INR goal:  2.0-3.0   TTR:  69.5% (6.4 mo)   INR used for dosin.00 (2024)   Weekly warfarin total:  40 mg               Assessment/Plan   Supratherapeutic, >8      1. New dose: 24  Spoke to Dr. Bonds and provided update to today's INR, INR trend, warfarin dose, no unidentifiable cause for supratherapeutic INR and patient denies any bleeding.  Dr. Bonds with plan of care hold warfarin dose today, patient to consume vitamin K today and return to clinic tomorrow for INR.  Plan of care read back to and verified by Dr. Bonds.  Discussed plan of care with patient and patient verbalized understanding.    2. Next INR:  tomorrow 24      Education provided to patient during the visit:  Patient instructed to call in interim with questions, concerns and changes.   Patient educated on interactions between medications and warfarin.   Patient educated on dietary consistency in vitamin k consumption.   Patient educated on affects of alcohol consumption while taking warfarin.   Patient educated  on signs of bleeding/clotting.   Patient educated on compliance with dosing, follow up appointments, and prescribed plan of care.

## 2024-04-12 ENCOUNTER — ANTICOAGULATION - WARFARIN VISIT (OUTPATIENT)
Dept: CARDIOLOGY | Facility: CLINIC | Age: 44
End: 2024-04-12
Payer: MEDICAID

## 2024-04-12 DIAGNOSIS — I48.0 PAROXYSMAL ATRIAL FIBRILLATION (MULTI): Primary | ICD-10-CM

## 2024-04-12 LAB
POC INR: 7.3
POC PROTHROMBIN TIME: NORMAL

## 2024-04-12 PROCEDURE — 99211 OFF/OP EST MAY X REQ PHY/QHP: CPT

## 2024-04-12 PROCEDURE — 85610 PROTHROMBIN TIME: CPT | Mod: QW

## 2024-04-12 NOTE — PROGRESS NOTES
Patient identification verified with 2 identifiers.    Location: [facility] at [facility] - [address] [address] [phone] option #1     Referring Physician: Dr. Migue Bonds  Enrollment/ Re-enrollment date: 25   INR Goal: 2.0-3.0  INR monitoring is per [facility] protocol.  Anticoagulation Medication: warfarin  Indication: Atrial Fibrillation/Atrial Flutter    Subjective   Bleeding signs/symptoms: No    Bruising: No   Major bleeding event: No  Thrombosis signs/symptoms: No  Thromboembolic event: No  Missed doses: Yes  missed dose yesterday due to supratherapeutic INR  Extra doses: No  Medication changes: Yes  patient started taking several Vitamin supplements including turmeric.   Dietary changes: Yes  patient had spinach salad yesterday.  Change in health: No  Change in activity: No  Alcohol: No  Other concerns: No    Upcoming Procedures:  Does the Patient Have any upcoming procedures that require interruption in anticoagulation therapy? no  Does the patient require bridging? no      Anticoagulation Summary  As of 2024      INR goal:  2.0-3.0   TTR:  69.2% (6.4 mo)   INR used for dosin.30 (2024)   Weekly warfarin total:  40 mg               Assessment/Plan   Supratherapeutic     1. New dose:  will continue to hold warfarin for 3 days.     2. Next INR:  3 days.      Education provided to patient during the visit:  Patient instructed to call in interim with questions, concerns and changes.   Patient educated on interactions between medications and warfarin.   Patient educated on dietary consistency in vitamin k consumption.   Patient educated on affects of alcohol consumption while taking warfarin.   Patient educated on signs of bleeding/clotting.    Patient advised to seek emergency attention for any bleeding complications.

## 2024-04-15 ENCOUNTER — ANTICOAGULATION - WARFARIN VISIT (OUTPATIENT)
Dept: CARDIOLOGY | Facility: CLINIC | Age: 44
End: 2024-04-15
Payer: MEDICAID

## 2024-04-15 DIAGNOSIS — I48.0 PAROXYSMAL ATRIAL FIBRILLATION (MULTI): Primary | ICD-10-CM

## 2024-04-15 LAB
POC INR: 1.9
POC PROTHROMBIN TIME: NORMAL

## 2024-04-15 PROCEDURE — 85610 PROTHROMBIN TIME: CPT | Mod: QW

## 2024-04-15 PROCEDURE — 99211 OFF/OP EST MAY X REQ PHY/QHP: CPT

## 2024-04-15 NOTE — PROGRESS NOTES
Patient identification verified with 2 identifiers.    Location: [facility] at [facility] - [address] [address] [phone] option #1      Referring Physician: Dr. Migue Bonds  Enrollment/ Re-enrollment date: 25   INR Goal: 2.0-3.0  INR monitoring is per [facility] protocol.  Anticoagulation Medication: warfarin  Indication: Atrial Fibrillation/Atrial Flutter       Subjective   Bleeding signs/symptoms: No    Bruising: No   Major bleeding event: No  Thrombosis signs/symptoms: No  Thromboembolic event: No  Missed doses: No  Extra doses: No  Medication changes: No  Dietary changes: No  Change in health: No  Change in activity: No  Alcohol: No  Other concerns: No    Upcoming Procedures:  Does the Patient Have any upcoming procedures that require interruption in anticoagulation therapy? no  Does the patient require bridging? no      Anticoagulation Summary  As of 4/15/2024      INR goal:  2.0-3.0   TTR:  68.5% (6.5 mo)   INR used for dosin.90 (4/15/2024)   Weekly warfarin total:  40 mg               Assessment/Plan   Subtherapeutic     1. New dose:  see dosing schedule     2. Next INR:  3 days      Education provided to patient during the visit:  Patient instructed to call in interim with questions, concerns and changes.   Patient educated on interactions between medications and warfarin.   Patient educated on compliance with dosing, follow up appointments, and prescribed plan of care.

## 2024-04-17 ENCOUNTER — OFFICE VISIT (OUTPATIENT)
Dept: CARDIOLOGY | Facility: HOSPITAL | Age: 44
End: 2024-04-17
Payer: MEDICAID

## 2024-04-17 ENCOUNTER — HOSPITAL ENCOUNTER (OUTPATIENT)
Dept: CARDIOLOGY | Facility: HOSPITAL | Age: 44
Discharge: HOME | End: 2024-04-17
Payer: MEDICAID

## 2024-04-17 VITALS
BODY MASS INDEX: 32.78 KG/M2 | SYSTOLIC BLOOD PRESSURE: 126 MMHG | WEIGHT: 192 LBS | DIASTOLIC BLOOD PRESSURE: 80 MMHG | HEART RATE: 74 BPM | HEIGHT: 64 IN

## 2024-04-17 DIAGNOSIS — E78.5 HYPERLIPIDEMIA, UNSPECIFIED HYPERLIPIDEMIA TYPE: ICD-10-CM

## 2024-04-17 DIAGNOSIS — I48.91 ATRIAL FIBRILLATION, UNSPECIFIED TYPE (MULTI): Primary | ICD-10-CM

## 2024-04-17 DIAGNOSIS — I42.9 CARDIOMYOPATHY, UNSPECIFIED TYPE (MULTI): Chronic | ICD-10-CM

## 2024-04-17 DIAGNOSIS — I50.41 ACUTE COMBINED SYSTOLIC (CONGESTIVE) AND DIASTOLIC (CONGESTIVE) HEART FAILURE (MULTI): ICD-10-CM

## 2024-04-17 DIAGNOSIS — I50.22 CHRONIC SYSTOLIC CONGESTIVE HEART FAILURE (MULTI): ICD-10-CM

## 2024-04-17 PROCEDURE — 2500000004 HC RX 250 GENERAL PHARMACY W/ HCPCS (ALT 636 FOR OP/ED): Performed by: INTERNAL MEDICINE

## 2024-04-17 PROCEDURE — 3050F LDL-C >= 130 MG/DL: CPT | Performed by: NURSE PRACTITIONER

## 2024-04-17 PROCEDURE — 93306 TTE W/DOPPLER COMPLETE: CPT

## 2024-04-17 PROCEDURE — 3074F SYST BP LT 130 MM HG: CPT | Performed by: NURSE PRACTITIONER

## 2024-04-17 PROCEDURE — 3079F DIAST BP 80-89 MM HG: CPT | Performed by: NURSE PRACTITIONER

## 2024-04-17 PROCEDURE — 99214 OFFICE O/P EST MOD 30 MIN: CPT | Performed by: NURSE PRACTITIONER

## 2024-04-17 PROCEDURE — 93005 ELECTROCARDIOGRAM TRACING: CPT | Performed by: NURSE PRACTITIONER

## 2024-04-17 PROCEDURE — 93306 TTE W/DOPPLER COMPLETE: CPT | Performed by: INTERNAL MEDICINE

## 2024-04-17 PROCEDURE — 3052F HG A1C>EQUAL 8.0%<EQUAL 9.0%: CPT | Performed by: NURSE PRACTITIONER

## 2024-04-17 RX ORDER — SACUBITRIL AND VALSARTAN 97; 103 MG/1; MG/1
1 TABLET, FILM COATED ORAL 2 TIMES DAILY
Qty: 180 TABLET | Refills: 3 | Status: SHIPPED | OUTPATIENT
Start: 2024-04-17 | End: 2025-04-17

## 2024-04-17 RX ADMIN — PERFLUTREN 3 ML OF DILUTION: 6.52 INJECTION, SUSPENSION INTRAVENOUS at 15:26

## 2024-04-17 NOTE — PROGRESS NOTES
Primary Care Physician: Arminda Thompson MD  Date of Visit: 2024  3:00 PM EDT  Location of visit: [facility]     Chief Complaint:   Chief Complaint   Patient presents with    Follow-up     4 month        HPI / Summary:   Jayde Daugherty is a 44 y.o. female presents for followup. Seen in collaboration with Dr. Bonds. She has no cardiac complaints. She has been walking on her treadmill for 45 minutes three times a week without chest pain or dyspnea.  The patient denies chest pain, shortness of breath, palpitations, lightheadedness, syncope, orthopnea, paroxysmal nocturnal dyspnea, lower extremity edema, or bleeding problems.          Past Medical History:   Diagnosis Date    Encounter for full-term uncomplicated delivery ([facility])      (spontaneous vaginal delivery)    Other conditions influencing health status     BMI (body mass index) 20.0-29.9    Other conditions influencing health status 06/10/2014    History of pregnancy    Other specified diseases and conditions complicating pregnancy ([facility]) 2019    Systemic lupus complicating pregnancy    Personal history of nicotine dependence 06/10/2019    Former cigarette smoker    Personal history of other diseases of the circulatory system 2014    Personal history of congestive heart failure    Personal history of other diseases of the circulatory system 2014    History of acute congestive heart failure    Personal history of other diseases of the circulatory system 2014    History of atrial fibrillation    Personal history of other diseases of the female genital tract     History of pelvic inflammatory disease    Personal history of other diseases of the respiratory system 2022    History of acute bronchitis    Personal history of other specified conditions     History of abnormal Pap smear    Systemic lupus erythematosus, unspecified (Multi) 2013    Systemic lupus erythematosus    Type A blood, Rh positive      Blood type A+        Past Surgical History:   Procedure Laterality Date    CERVICAL BIOPSY  W/ LOOP ELECTRODE EXCISION  2014    Cervical Loop Electrosurgical Excision (LEEP)     SECTION, CLASSIC  2014     Section    HYSTERECTOMY  2016    Hysterectomy    MR HEAD ANGIO WO IV CONTRAST  2013    MR HEAD ANGIO WO IV CONTRAST 2013 [facility]    MR NECK ANGIO WO IV CONTRAST  2013    MR NECK ANGIO WO IV CONTRAST 2013 [facility] SUR[facility]IB [facility]GAC[facility]    OTHER SURGICAL HISTORY  2019    Cardioverter defibrillator insertion    TOTAL HIP ARTHROPLASTY Bilateral 2014    Hip Replacement          Social History:   reports that she quit smoking about 8 years ago. Her smoking use included cigarettes. She started smoking about 33 years ago. She has a 25 pack-year smoking history. She has never used smokeless tobacco. She reports current alcohol use of about 3.0 standard drinks of alcohol per week. She reports that she does not currently use drugs.     Family History:  family history is not on file.      Allergies:  Allergies   Allergen Reactions    Hay Fever And Allergy Relief Other     Sneezing, itchy eyes       Outpatient Medications:  Current Outpatient Medications   Medication Instructions    albuterol 90 mcg/actuation inhaler inhalation, Every 6 hours PRN    budesonide-formoteroL (Symbicort) 160-4.5 mcg/actuation inhaler inhalation, 2 times daily    clindamycin (Cleocin T) 1 % lotion 1 Application    clopidogrel (PLAVIX) 75 mg, oral, Daily    desipramine (NORPRAMIN) 10 mg, oral, Daily    DULoxetine (CYMBALTA) 30 mg, oral, 2 times daily    Farxiga 10 mg, oral, Daily    furosemide (LASIX) 40 mg, oral, As needed    gabapentin (NEURONTIN) 100 mg, oral, 2 times daily    hydroxychloroquine (Plaquenil) 200 mg tablet oral, 2 times daily    metoprolol succinate XL (TOPROL-XL) 200 mg, oral, Daily    montelukast (SINGULAIR) 10 mg, oral, Daily    pantoprazole (PROTONIX) 40 mg,  "oral, Daily    potassium chloride CR 10 mEq ER tablet 10 mEq, oral, As needed    rosuvastatin (CRESTOR) 40 mg, oral, Daily    sacubitriL-valsartan (Entresto)  mg tablet 1 tablet, oral, 2 times daily    sertraline (ZOLOFT) 100 mg, oral, Daily    spironolactone (Aldactone) 25 mg tablet 0.5 tablets, oral, Daily    warfarin (Coumadin) 5 mg tablet TAKE 1 TABLET BY MOUTH ONCE DAILY EXCEPT TAKE 1 AND 1/2 TABLETS BY MOUTH ON MONDAY    warfarin (COUMADIN) 7.5 mg, oral       Physical Exam:  Vitals:    04/17/24 1423 04/17/24 1633   BP: (!) 130/96 126/80   BP Location: Left arm Left arm   Patient Position: Sitting    BP Cuff Size: Adult    Pulse: 74    Weight: 87.1 kg (192 lb)    Height: 1.626 m (5' 4\")      Wt Readings from Last 5 Encounters:   04/17/24 87.1 kg (192 lb)   12/12/23 83.9 kg (185 lb)   05/24/23 83 kg (183 lb)   02/22/23 82.1 kg (181 lb)   01/18/23 83 kg (183 lb)     Body mass index is 32.96 kg/m².     GENERAL: alert, cooperative, pleasant, in no acute distress  SKIN: warm and dry  NECK: Normal JVD, negative HJR  CARDIAC: Regular rate and rhythm with no rubs, murmurs, or gallops  CHEST: Normal respiratory efforts, lungs clear to auscultation bilaterally.  ABDOMEN: soft, nontender, nondistended  EXTREMITIES: no edema, +2 palpable RP bilaterally       Last Labs:  CMP:  Recent Labs     04/02/24  1325 02/14/23  1113 02/07/23  1023    140 136   K 4.0 3.8 3.6    104 101   CO2 27 27 26   ANIONGAP 15 13 13   BUN 10 8 12   CREATININE 0.95 0.97 0.78   EGFR 77  --   --    GLUCOSE 153* 153* 91     Recent Labs     04/02/24  1325 02/14/23  1113 02/07/23  1023   ALBUMIN 3.9 3.6 4.0   ALKPHOS 48 40 38   ALT 15 21 16   AST 13 14 13   BILITOT 0.7 0.4 0.7     CBC:  Recent Labs     04/02/24  1325 02/15/23  0506 02/14/23  1113   WBC 3.8* 5.0 4.8   HGB 14.6 11.7* 13.5   HCT 45.2 36.5 40.9    191 230   MCV 91 93 91     COAG:   Recent Labs     04/22/24  0000 04/18/24  1128 09/23/23  0000 02/15/23  1057 " 01/05/23  0603 01/04/23  1907   INR 4.10 1.80   < >  --    < > 2.4*   DDIMERVTE  --   --   --  459  --  484    < > = values in this interval not displayed.     HEME/ENDO:  Recent Labs     04/02/24  1325 02/15/23  0506 09/29/22  1628 12/21/21  1137 12/17/21  1539   FERRITIN  --   --   --   --  321*   TSH 1.77  --  0.82 1.55  --    HGBA1C 8.3* 6.5* 6.2* 9.0*  --       CARDIAC:   Recent Labs     02/14/23  1254 02/14/23  1113 01/06/23  1622 01/05/23  0603 01/04/23  1907   TROPHS 8 8  --  25* 32*   BNP  --  170* 266*  --  594*     Recent Labs     04/02/24  1325 02/15/23  0506 09/29/22  1628 09/19/22  1013   CHOL 259* 170 276* 204*   LDLF  --  97 174* 128*   LDLCALC 161*  --   --   --    HDL 56.9 47.6 68.3 58.8   TRIG 204* 129 170* 88       Last Cardiology Tests:  ECG:  Obtained and reviewed EKG- normal sinus rhythm HR 74, LAFB, possible prior anterior infarct, T wave abnormality consider inferolateral ischemia similar to prior EKG    Echo:  4/17/24  CONCLUSIONS:   1. Left ventricular systolic function is severely decreased with a 25-30% estimated ejection fraction.   2. Apical septal segment, apical inferior segment, and apex are abnormal.   3. Spectral Doppler shows an impaired relaxation pattern of left ventricular diastolic filling.   4. There is mildly reduced right ventricular systolic function.   5. There is global hypokinesis of the left ventricle with minor regional variations.    Cath:  Right heart catheterization January 6, 2023  CONCLUSIONS:   1. Normal left and right heart filling pressures.   2. No evidence of pulmonary hypertension.   3. Low normal cardiac index with elevated SVR (1498).   4. No evidence of intracardiac shunt.    Cardiac catheterization September 19, 2022  CONCLUSIONS:   1. No significant CAD in a codominant system.    Cardiac catheterization June 2, 2019  Coronary Lesion Summary:  Vessel   Stenosis    Vessel Segment  LAD    100% stenosis proximal to mid  CONCLUSIONS:   1. Successful OCT  guided PCI of the distal left main and proximal LAD with mechanical thrombectomy.   2. 100% thormbotic occlusion of proximal-mid LAD with large thrombus burden in distal LM and proximal LAD in a codominant system.   3. Likely embolic vs. insitu thrombosis with otherwise angiographically normal coronary arteries.   4. Elevated LVEDP.   5. No aortic stenosis.   6. Left Ventricular end-diastolic pressure = 35.           Cardiac Imaging:  CT angio of the chest January 4, 2023  FINDINGS:     Pulmonary arteries are adequately opacified without acute or chronic  filling defects.       The heart is normal in size without pericardial effusion.       Thoracic lymph nodes are not enlarged.     There is no pleural effusion, pleural thickening, or pneumothorax.           Assessment/Plan     Jayde was seen today for follow-up.  Diagnoses and all orders for this visit:  Atrial fibrillation, unspecified type (Multi) (Primary)  -     ECG 12 lead (Clinic Performed)  Cardiomyopathy, unspecified type (Multi)  -     sacubitriL-valsartan (Entresto)  mg tablet; Take 1 tablet by mouth 2 times a day.  -     Referral to Advanced Heart Failure Program; Future  Hyperlipidemia, unspecified hyperlipidemia type  -     Lipid Panel; Future      In summary Ms. 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  She is relatively asymptomatic. She has not had any chest pain. She has history of  prior chest discomfort that is noncardiac in nature when she  has been off of Cymbalta.  She appears euvolemic on exam. Her echocardiogram did showed reduced LV function today. I did refer her to advance heart failure to establish care. Her recent lipid panel was not at goal. She has now been taking her statin. I did order fasting lipid panel to be done in 3 months. I have encouraged her to lose weight and continue physical activity. She should continue her current cardiovascular medications. She will follow up in 6 months.       Orders:  Orders Placed This Encounter   Procedures    Lipid Panel    Referral to Advanced Heart Failure Program    ECG 12 lead (Clinic Performed)      Followup Appts:  Future Appointments   Date Time Provider Department Center   [id number] [id number] [facility]   5/3/2024  9:45 AM Shanti Bro MD [id number] None   10/22/2024  4:00 PM MARCIA Ivy [id number] [facility]   4/22/2025  4:00 PM Migue Bonds MD [id number] [facility]           ____________________________________________________________  MARCIA Ivy  [facility] [place]  [facility]

## 2024-04-17 NOTE — PATIENT INSTRUCTIONS
Continue current cardiovascular medications  Check fasting lipid panel in 3 months  Follow up in 6 months  Referral to advance heart failure Dr. Rodriguez  Continue physical activity  Consider Mediterranean diet

## 2024-04-18 ENCOUNTER — ANTICOAGULATION - WARFARIN VISIT (OUTPATIENT)
Dept: CARDIOLOGY | Facility: CLINIC | Age: 44
End: 2024-04-18
Payer: MEDICAID

## 2024-04-18 DIAGNOSIS — I48.0 PAROXYSMAL ATRIAL FIBRILLATION (MULTI): Primary | ICD-10-CM

## 2024-04-18 LAB
AORTIC VALVE PEAK VELOCITY: 1.01 M/S
ATRIAL RATE: 74 BPM
AV PEAK GRADIENT: 4.1 MMHG
AVA (PEAK VEL): 2.55 CM2
EJECTION FRACTION APICAL 4 CHAMBER: 46.8
LEFT ATRIUM VOLUME AREA LENGTH INDEX BSA: 32.9 ML/M2
LEFT VENTRICLE INTERNAL DIMENSION DIASTOLE: 6.27 CM (ref 3.5–6)
LEFT VENTRICULAR OUTFLOW TRACT DIAMETER: 2.3 CM
LV EJECTION FRACTION BIPLANE: 39 %
MITRAL VALVE E/A RATIO: 0.94
P AXIS: 59 DEGREES
P OFFSET: 192 MS
P ONSET: 134 MS
POC INR: 1.8
POC PROTHROMBIN TIME: NORMAL
PR INTERVAL: 158 MS
Q ONSET: 213 MS
QRS COUNT: 12 BEATS
QRS DURATION: 102 MS
QT INTERVAL: 426 MS
QTC CALCULATION(BAZETT): 472 MS
QTC FREDERICIA: 457 MS
R AXIS: -62 DEGREES
RIGHT VENTRICLE FREE WALL PEAK S': 7 CM/S
T AXIS: -57 DEGREES
T OFFSET: 426 MS
TRICUSPID ANNULAR PLANE SYSTOLIC EXCURSION: 1.7 CM
VENTRICULAR RATE: 74 BPM

## 2024-04-18 PROCEDURE — 85610 PROTHROMBIN TIME: CPT | Mod: QW

## 2024-04-18 NOTE — PROGRESS NOTES
Patient identification verified with 2 identifiers.    Location: [facility] at [facility] - [address] [address] [phone] option #1     Referring Physician: Dr. Migue Bonds  Enrollment/ Re-enrollment date: 25   INR Goal: 2.0-3.0  INR monitoring is per [facility] protocol.  Anticoagulation Medication: warfarin  Indication: Atrial Fibrillation/Atrial Flutter    Subjective   Bleeding signs/symptoms: No    Bruising: No   Major bleeding event: No  Thrombosis signs/symptoms: No  Thromboembolic event: No  Missed doses: No  Extra doses: No  Medication changes: No  Dietary changes: No  Change in health: No  Change in activity: No  Alcohol: No  Other concerns: No    Upcoming Procedures:  Does the Patient Have any upcoming procedures that require interruption in anticoagulation therapy? no  Does the patient require bridging? no      Anticoagulation Summary  As of 2024      INR goal:  2.0-3.0   TTR:  67.3% (6.6 mo)   INR used for dosin.80 (2024)   Weekly warfarin total:  35 mg               Assessment/Plan   Subtherapeutic after several day dose hold.     1. New dose:  will have patient take 7.5 mg today and 5 mg over the weekend.      2. Next INR:  4 days.      Education provided to patient during the visit:  Patient instructed to call in interim with questions, concerns and changes.   Patient educated on interactions between medications and warfarin.   Patient educated on dietary consistency in vitamin k consumption.   Patient educated on affects of alcohol consumption while taking warfarin.   Patient educated on signs of bleeding/clotting.

## 2024-04-19 ENCOUNTER — APPOINTMENT (OUTPATIENT)
Dept: OBSTETRICS AND GYNECOLOGY | Facility: CLINIC | Age: 44
End: 2024-04-19
Payer: MEDICAID

## 2024-04-22 ENCOUNTER — ANTICOAGULATION - WARFARIN VISIT (OUTPATIENT)
Dept: CARDIOLOGY | Facility: CLINIC | Age: 44
End: 2024-04-22
Payer: MEDICAID

## 2024-04-22 DIAGNOSIS — I48.0 PAROXYSMAL ATRIAL FIBRILLATION (MULTI): ICD-10-CM

## 2024-04-22 LAB
POC INR: 4.1
POC PROTHROMBIN TIME: NORMAL

## 2024-04-22 PROCEDURE — 99211 OFF/OP EST MAY X REQ PHY/QHP: CPT

## 2024-04-22 PROCEDURE — 85610 PROTHROMBIN TIME: CPT | Mod: QW

## 2024-04-22 NOTE — PROGRESS NOTES
Patient identification verified with 2 identifiers.    Location: [facility] at [facility] - [address] [address] [phone] option #1     Referring Physician: BRIDGETTE MCDANIEL  Enrollment/ Re-enrollment date: 2025   INR Goal: 2.0-3.0  INR monitoring is per [facility] protocol.  Anticoagulation Medication: warfarin  Indication: Atrial Fibrillation/Atrial Flutter    Subjective   Bleeding signs/symptoms: No    Bruising: No   Major bleeding event: No  Thrombosis signs/symptoms: No  Thromboembolic event: No  Missed doses: No  Extra doses: No  Medication changes: No  Dietary changes: No  Change in health: No  Change in activity: No  Alcohol: Yes  LIGHT ALCOHOL USE ON   Other concerns: No    Upcoming Procedures:  Does the Patient Have any upcoming procedures that require interruption in anticoagulation therapy? no  Does the patient require bridging? no      Anticoagulation Summary  As of 2024      INR goal:  2.0-3.0   TTR:  66.9% (6.7 mo)   INR used for dosin.10 (2024)   Weekly warfarin total:  32.5 mg               Assessment/Plan   Supratherapeutic     1. New dose:  hold x 2 days, increase Vit K, no alcohol     2. Next INR: 1 week      Education provided to patient during the visit:  Patient instructed to call in interim with questions, concerns and changes.   Patient educated on interactions between medications and warfarin.   Patient educated on dietary consistency in vitamin k consumption.   Patient educated on affects of alcohol consumption while taking warfarin.   Patient educated on signs of bleeding/clotting.   Patient educated on compliance with dosing, follow up appointments, and prescribed plan of care.      PT ADVISED TO SEEK EMERGENCY CARE FOR A FALL, HEAD INJURY OR UNCONTROLLED BLEEDING.

## 2024-04-29 ENCOUNTER — ANTICOAGULATION - WARFARIN VISIT (OUTPATIENT)
Dept: CARDIOLOGY | Facility: CLINIC | Age: 44
End: 2024-04-29
Payer: MEDICAID

## 2024-04-29 DIAGNOSIS — I48.0 PAROXYSMAL ATRIAL FIBRILLATION (MULTI): Primary | ICD-10-CM

## 2024-04-29 LAB
POC INR: 4
POC PROTHROMBIN TIME: NORMAL

## 2024-04-29 PROCEDURE — 85610 PROTHROMBIN TIME: CPT | Mod: QW

## 2024-04-29 PROCEDURE — 99211 OFF/OP EST MAY X REQ PHY/QHP: CPT

## 2024-04-29 NOTE — PROGRESS NOTES
Patient identification verified with 2 identifiers.    Location: [facility] at [facility] - [address] [address] [phone] option #1      Referring Physician: BRIDGETTE MCDANIEL  Enrollment/ Re-enrollment date: 2025   INR Goal: 2.0-3.0  INR monitoring is per [facility] protocol.  Anticoagulation Medication: warfarin  Indication: Atrial Fibrillation/Atrial Flutter       Subjective   Bleeding signs/symptoms: No    Bruising: No   Major bleeding event: No  Thrombosis signs/symptoms: No  Thromboembolic event: No  Missed doses: No  Extra doses: No  Medication changes: No  Dietary changes: No  Change in health: No  Change in activity: No  Alcohol: No  Other concerns: No    Upcoming Procedures:  Does the Patient Have any upcoming procedures that require interruption in anticoagulation therapy? no  Does the patient require bridging? no      Anticoagulation Summary  As of 2024      INR goal:  2.0-3.0   TTR:  64.7% (6.9 mo)   INR used for dosin.00 (2024)   Weekly warfarin total:  30 mg               Assessment/Plan   Supratherapeutic     1. New dose:  hold 2 doses, decrease weekly dose     2. Next INR:  10days      Education provided to patient during the visit:  Patient instructed to call in interim with questions, concerns and changes.   Patient educated on interactions between medications and warfarin.   Patient educated on dietary consistency in vitamin k consumption.   Patient educated on affects of alcohol consumption while taking warfarin.   Patient educated on compliance with dosing, follow up appointments, and prescribed plan of care.

## 2024-05-03 ENCOUNTER — OFFICE VISIT (OUTPATIENT)
Dept: OBSTETRICS AND GYNECOLOGY | Facility: CLINIC | Age: 44
End: 2024-05-03
Payer: MEDICAID

## 2024-05-03 ENCOUNTER — LAB (OUTPATIENT)
Dept: LAB | Facility: LAB | Age: 44
End: 2024-05-03
Payer: MEDICAID

## 2024-05-03 VITALS
HEIGHT: 64 IN | SYSTOLIC BLOOD PRESSURE: 120 MMHG | DIASTOLIC BLOOD PRESSURE: 80 MMHG | BODY MASS INDEX: 33.29 KG/M2 | WEIGHT: 195 LBS

## 2024-05-03 DIAGNOSIS — Z12.4 SCREENING FOR MALIGNANT NEOPLASM OF CERVIX: ICD-10-CM

## 2024-05-03 DIAGNOSIS — Z11.3 ROUTINE SCREENING FOR STI (SEXUALLY TRANSMITTED INFECTION): ICD-10-CM

## 2024-05-03 DIAGNOSIS — Z01.419 WELL WOMAN EXAM: ICD-10-CM

## 2024-05-03 DIAGNOSIS — N81.89 OTHER FEMALE GENITAL PROLAPSE: ICD-10-CM

## 2024-05-03 DIAGNOSIS — Z23 NEED FOR HPV VACCINATION: ICD-10-CM

## 2024-05-03 DIAGNOSIS — Z01.419 WELL WOMAN EXAM: Primary | ICD-10-CM

## 2024-05-03 LAB
HBV SURFACE AG SERPL QL IA: NONREACTIVE
HCV AB SER QL: NONREACTIVE
HIV 1+2 AB+HIV1 P24 AG SERPL QL IA: NONREACTIVE
TREPONEMA PALLIDUM IGG+IGM AB [PRESENCE] IN SERUM OR PLASMA BY IMMUNOASSAY: NONREACTIVE

## 2024-05-03 PROCEDURE — 90471 IMMUNIZATION ADMIN: CPT | Performed by: STUDENT IN AN ORGANIZED HEALTH CARE EDUCATION/TRAINING PROGRAM

## 2024-05-03 PROCEDURE — 3074F SYST BP LT 130 MM HG: CPT | Performed by: STUDENT IN AN ORGANIZED HEALTH CARE EDUCATION/TRAINING PROGRAM

## 2024-05-03 PROCEDURE — 3050F LDL-C >= 130 MG/DL: CPT | Performed by: STUDENT IN AN ORGANIZED HEALTH CARE EDUCATION/TRAINING PROGRAM

## 2024-05-03 PROCEDURE — 86803 HEPATITIS C AB TEST: CPT

## 2024-05-03 PROCEDURE — 99386 PREV VISIT NEW AGE 40-64: CPT | Performed by: STUDENT IN AN ORGANIZED HEALTH CARE EDUCATION/TRAINING PROGRAM

## 2024-05-03 PROCEDURE — 87800 DETECT AGNT MULT DNA DIREC: CPT

## 2024-05-03 PROCEDURE — 99203 OFFICE O/P NEW LOW 30 MIN: CPT | Performed by: STUDENT IN AN ORGANIZED HEALTH CARE EDUCATION/TRAINING PROGRAM

## 2024-05-03 PROCEDURE — 90651 9VHPV VACCINE 2/3 DOSE IM: CPT | Performed by: STUDENT IN AN ORGANIZED HEALTH CARE EDUCATION/TRAINING PROGRAM

## 2024-05-03 PROCEDURE — 86780 TREPONEMA PALLIDUM: CPT

## 2024-05-03 PROCEDURE — 87661 TRICHOMONAS VAGINALIS AMPLIF: CPT

## 2024-05-03 PROCEDURE — 3052F HG A1C>EQUAL 8.0%<EQUAL 9.0%: CPT | Performed by: STUDENT IN AN ORGANIZED HEALTH CARE EDUCATION/TRAINING PROGRAM

## 2024-05-03 PROCEDURE — 1036F TOBACCO NON-USER: CPT | Performed by: STUDENT IN AN ORGANIZED HEALTH CARE EDUCATION/TRAINING PROGRAM

## 2024-05-03 PROCEDURE — 87624 HPV HI-RISK TYP POOLED RSLT: CPT

## 2024-05-03 PROCEDURE — 88175 CYTOPATH C/V AUTO FLUID REDO: CPT

## 2024-05-03 PROCEDURE — 36415 COLL VENOUS BLD VENIPUNCTURE: CPT

## 2024-05-03 PROCEDURE — 87340 HEPATITIS B SURFACE AG IA: CPT

## 2024-05-03 PROCEDURE — 3079F DIAST BP 80-89 MM HG: CPT | Performed by: STUDENT IN AN ORGANIZED HEALTH CARE EDUCATION/TRAINING PROGRAM

## 2024-05-03 PROCEDURE — 87389 HIV-1 AG W/HIV-1&-2 AB AG IA: CPT

## 2024-05-03 ASSESSMENT — PAIN SCALES - GENERAL: PAINLEVEL: 0-NO PAIN

## 2024-05-03 NOTE — PROGRESS NOTES
Subjective   Patient ID: Jayde Daugherty is a 44 y.o. female who presents for New Patient Visit (Discuss prolapse and urinary frequency //Chaperon declined:  Mora King CMA/).  Ms. Daugherty presents for discussion of incontinence and possible prolapse.  She has been experiencing incontinence of both urine and stool for several years.  Notes symptoms occur with activity such as jumping and also notes urgency - needs to go immediately as soon as she's aware of need.  Urinary incontinence occurs daily.  Incontinence of stool is less frequent and has actually decreased in frequency.    Had been diagnosed in the past with endometriosis after onset of pelvic pain though sx are less frequent now.    Hysterectomy in 2014, peripartum, supracervical.    In regards to possible prolapse she notes about a year ago noticed that she could see her cervix when wiping.    Notably, she does have a complicated medical history including SLE and MI x 2 with significant apical involvement and reduced EF most recently 25% per her recall though ECHO result is not currently available for review.    Ms. Daugherty is also in need of well woman care and is amenable to complete today.        Review of Systems   All other systems reviewed and are negative.      Objective   Physical Exam  Constitutional:       Appearance: Normal appearance.   HENT:      Head: Normocephalic and atraumatic.      Nose: Nose normal.      Mouth/Throat:      Mouth: Mucous membranes are moist.      Pharynx: No oropharyngeal exudate or posterior oropharyngeal erythema.   Eyes:      Extraocular Movements: Extraocular movements intact.      Conjunctiva/sclera: Conjunctivae normal.   Pulmonary:      Effort: Pulmonary effort is normal.   Genitourinary:     General: Normal vulva.      Comments: Cystocele and rectocele visible with patient in lithotomy at rest without valsalva, with descent to introitus with valsalva, cervix palpable 4-5 cm proximal to  introitus, some increased tone and tenderness of pelvic floor R>L  Musculoskeletal:      Cervical back: Normal range of motion.   Skin:     Findings: No bruising, erythema, lesion or rash.   Neurological:      General: No focal deficit present.      Mental Status: She is alert.   Psychiatric:         Mood and Affect: Mood normal.         Behavior: Behavior normal.         Thought Content: Thought content normal.         Judgment: Judgment normal.         Assessment/Plan   - cotest and STI panel for well care  - has never had Gardasil vaccine and was amenable to initiate the series today, dose #1 given  - discussed natural history of POP and incontinence including common nature of the conditions, some genetic component likely, and contribution of obstetric history  - reviewed that surgical and non-surgical therapies are available but that her medical history may preclude surgical management  - recommended referral to [facility] for discussion of above as well as to PT for pelvic floor therapy       Follow up for well care or as needed.    Shanti Bro MD 05/03/24 10:05 AM

## 2024-05-07 LAB
C TRACH RRNA SPEC QL NAA+PROBE: NEGATIVE
N GONORRHOEA DNA SPEC QL PROBE+SIG AMP: NEGATIVE
T VAGINALIS RRNA SPEC QL NAA+PROBE: NEGATIVE

## 2024-05-10 ENCOUNTER — APPOINTMENT (OUTPATIENT)
Dept: CARDIOLOGY | Facility: CLINIC | Age: 44
End: 2024-05-10
Payer: MEDICAID

## 2024-05-10 ENCOUNTER — ANTICOAGULATION - WARFARIN VISIT (OUTPATIENT)
Dept: CARDIOLOGY | Facility: CLINIC | Age: 44
End: 2024-05-10
Payer: MEDICAID

## 2024-05-10 DIAGNOSIS — I48.0 PAROXYSMAL ATRIAL FIBRILLATION (MULTI): Primary | ICD-10-CM

## 2024-05-13 ENCOUNTER — ANTICOAGULATION - WARFARIN VISIT (OUTPATIENT)
Dept: CARDIOLOGY | Facility: CLINIC | Age: 44
End: 2024-05-13
Payer: MEDICAID

## 2024-05-13 DIAGNOSIS — I48.0 PAROXYSMAL ATRIAL FIBRILLATION (MULTI): Primary | ICD-10-CM

## 2024-05-13 LAB
POC INR: 5
POC PROTHROMBIN TIME: NORMAL

## 2024-05-13 PROCEDURE — 85610 PROTHROMBIN TIME: CPT | Mod: QW

## 2024-05-13 PROCEDURE — 99211 OFF/OP EST MAY X REQ PHY/QHP: CPT

## 2024-05-13 NOTE — PROGRESS NOTES
Patient identification verified with 2 identifiers.    Location: [email or web address] at [facility] - [address] [address] [phone] option #1      Referring Physician: BRIDGETTE MCDANIEL  Enrollment/ Re-enrollment date: 2025   INR Goal: 2.0-3.0  INR monitoring is per [facility] protocol.  Anticoagulation Medication: warfarin  Indication: Atrial Fibrillation/Atrial Flutter    Subjective   Bleeding signs/symptoms: No    Bruising: No   Major bleeding event: No  Thrombosis signs/symptoms: No  Thromboembolic event: No  Missed doses: No  Extra doses: No  Took 7.5mg Saturday as past dosing schedule  Medication changes: No  Dietary changes: No  Change in health: No  Change in activity: No  Alcohol: No  Other concerns: No    Upcoming Procedures:  Does the Patient Have any upcoming procedures that require interruption in anticoagulation therapy? no  Does the patient require bridging? no      Anticoagulation Summary  As of 2024      INR goal:  2.0-3.0   TTR:  60.5% (7.4 mo)   INR used for dosin.00 (2024)   Weekly warfarin total:  27.5 mg               Assessment/Plan   Supratherapeutic     1. New dose:  Hold 2 days     2. Next INR:  5/15      Education provided to patient during the visit:  Patient instructed to call in interim with questions, concerns and changes.   Patient educated on interactions between medications and warfarin.   Patient educated on dietary consistency in vitamin k consumption.   Patient educated on affects of alcohol consumption while taking warfarin.   Patient educated on signs of bleeding/clotting.

## 2024-05-15 ENCOUNTER — ANTICOAGULATION - WARFARIN VISIT (OUTPATIENT)
Dept: CARDIOLOGY | Facility: CLINIC | Age: 44
End: 2024-05-15
Payer: MEDICAID

## 2024-05-15 DIAGNOSIS — I48.0 PAROXYSMAL ATRIAL FIBRILLATION (MULTI): Primary | ICD-10-CM

## 2024-05-15 LAB
POC INR: 2.6
POC PROTHROMBIN TIME: NORMAL

## 2024-05-15 PROCEDURE — 85610 PROTHROMBIN TIME: CPT | Mod: QW

## 2024-05-15 PROCEDURE — 99211 OFF/OP EST MAY X REQ PHY/QHP: CPT

## 2024-05-15 NOTE — PROGRESS NOTES
Patient identification verified with 2 identifiers.    Location: [facility] at [facility] - [address] [address] [phone] option #1     Referring Physician: DR MCDANIEL  Enrollment/ Re-enrollment date: 25   INR Goal: 2.0-3.0  INR monitoring is per [facility] protocol.  Anticoagulation Medication: warfarin  Indication: Atrial Fibrillation/Atrial Flutter    Subjective   Bleeding signs/symptoms: No    Bruising: No   Major bleeding event: No  Thrombosis signs/symptoms: No  Thromboembolic event: No  Missed doses: No  Extra doses: No  Medication changes: Yes  PT WILL TAKE B12 SUPPLEMENT AND A MVI DAILY.  Dietary changes: Yes  PT WILL TRY TO EAT GREEN VEGETABLES 2-3 TIMES PER WEEK.  Change in health: No  Change in activity: No  Alcohol: No  Other concerns: No    Upcoming Procedures:  Does the Patient Have any upcoming procedures that require interruption in anticoagulation therapy? no  Does the patient require bridging? no      Anticoagulation Summary  As of 5/15/2024      INR goal:  2.0-3.0   TTR:  60.1% (7.5 mo)   INR used for dosin.60 (5/15/2024)   Weekly warfarin total:  27.5 mg               Assessment/Plan   Therapeutic     1. New dose:  PT WILL TAKE 2.5MG ON 05/15 AND  AND 5MG ON .     2. Next INR:  4 DAYS      Education provided to patient during the visit:  Patient instructed to call in interim with questions, concerns and changes.   Patient educated on interactions between medications and warfarin.   Patient educated on dietary consistency in vitamin k consumption.   Patient educated on signs of bleeding/clotting.   Patient educated on compliance with dosing, follow up appointments, and prescribed plan of care.

## 2024-05-17 LAB
CYTOLOGY CMNT CVX/VAG CYTO-IMP: NORMAL
HPV HR 12 DNA GENITAL QL NAA+PROBE: NEGATIVE
HPV HR GENOTYPES PNL CVX NAA+PROBE: NEGATIVE
HPV16 DNA SPEC QL NAA+PROBE: NEGATIVE
HPV18 DNA SPEC QL NAA+PROBE: NEGATIVE
LAB AP HPV GENOTYPE QUESTION: YES
LAB AP HPV HR: NORMAL
LAB AP PAP ADDITIONAL TESTS: NORMAL
LAB AP PREVIOUS ABNORMAL HISTORY: NORMAL
LAB AP TREATMENT HISTORY: NORMAL
LABORATORY COMMENT REPORT: NORMAL
MENSTRUAL HX REPORTED: NORMAL
PATH REPORT.TOTAL CANCER: NORMAL

## 2024-05-18 ENCOUNTER — ANTICOAGULATION - WARFARIN VISIT (OUTPATIENT)
Dept: CARDIOLOGY | Facility: CLINIC | Age: 44
End: 2024-05-18
Payer: MEDICAID

## 2024-05-18 DIAGNOSIS — I48.0 PAROXYSMAL ATRIAL FIBRILLATION (MULTI): Primary | ICD-10-CM

## 2024-05-18 LAB
POC INR: 2.2
POC PROTHROMBIN TIME: NORMAL

## 2024-05-18 PROCEDURE — 85610 PROTHROMBIN TIME: CPT | Mod: QW

## 2024-05-18 PROCEDURE — 99211 OFF/OP EST MAY X REQ PHY/QHP: CPT

## 2024-05-18 NOTE — PROGRESS NOTES
Patient identification verified with 2 identifiers.    Location: [facility] at [facility] - [address] [address] [phone] option #1     Referring Physician: Migue Bonds MD   Enrollment/ Re-enrollment date: 2025   INR Goal: 2.0-3.0  INR monitoring is per [facility] protocol.  Anticoagulation Medication: warfarin  Indication: Atrial Fibrillation/Atrial Flutter    Subjective   Bleeding signs/symptoms: No    Bruising: No   Major bleeding event: No  Thrombosis signs/symptoms: No  Thromboembolic event: No  Missed doses: No  Extra doses: No  Medication changes: No  Dietary changes: Yes  pt will begin Blends Benefits Greens   Change in health: No  Change in activity: No  Alcohol: No  Other concerns: No    Upcoming Procedures:  Does the Patient Have any upcoming procedures that require interruption in anticoagulation therapy? no  Does the patient require bridging? no      Anticoagulation Summary  As of 2024      INR goal:  2.0-3.0   TTR:  60.6% (7.6 mo)   INR used for dosin.20 (2024)   Weekly warfarin total:  27.5 mg               Assessment/Plan   Therapeutic     1. New dose: no change    2. Next INR:  Thursday May 23, 2024      Education provided to patient during the visit:  Patient instructed to call in interim with questions, concerns and changes.

## 2024-05-20 DIAGNOSIS — N89.8 VAGINAL ITCHING: Primary | ICD-10-CM

## 2024-05-20 RX ORDER — ASPIRIN 325 MG
1 TABLET, DELAYED RELEASE (ENTERIC COATED) ORAL DAILY
Qty: 45 G | Refills: 0 | Status: SHIPPED | OUTPATIENT
Start: 2024-05-20 | End: 2024-05-27

## 2024-05-20 NOTE — TELEPHONE ENCOUNTER
----- Message from Shanti Bro MD sent at 2024  6:04 PM EDT -----  History:   - ascus/hpv positive cotest  2017 - negative cotest  Current result - negative cotest  Plan: repeat cotest in 5 years    Contacted patient to discuss pap results  Patient identified by name and   Informed patient that her pap was reported as normal and that she does not need another pap until May 2029  Encouraged patient to still be seen yearly for an annual visit  While on the phone patient mentioned she has had vaginal itching  She denies any discharge or odor  Patient states her pcp has sent her in Fluconazole but it has not helped her symptoms  Patient open to trying cream treatment   Will notify provider of symptoms  Pharmacy verified  Caitlyn Walter RN

## 2024-05-21 ENCOUNTER — OFFICE VISIT (OUTPATIENT)
Dept: CARDIOLOGY | Facility: CLINIC | Age: 44
End: 2024-05-21
Payer: MEDICAID

## 2024-05-21 VITALS
OXYGEN SATURATION: 95 % | WEIGHT: 190.2 LBS | BODY MASS INDEX: 32.65 KG/M2 | SYSTOLIC BLOOD PRESSURE: 119 MMHG | TEMPERATURE: 97.5 F | HEART RATE: 78 BPM | DIASTOLIC BLOOD PRESSURE: 82 MMHG

## 2024-05-21 DIAGNOSIS — I50.20 HFREF (HEART FAILURE WITH REDUCED EJECTION FRACTION) (MULTI): Primary | ICD-10-CM

## 2024-05-21 DIAGNOSIS — I25.5 ISCHEMIC CARDIOMYOPATHY: ICD-10-CM

## 2024-05-21 PROCEDURE — 1036F TOBACCO NON-USER: CPT | Performed by: INTERNAL MEDICINE

## 2024-05-21 PROCEDURE — 3074F SYST BP LT 130 MM HG: CPT | Performed by: INTERNAL MEDICINE

## 2024-05-21 PROCEDURE — 3050F LDL-C >= 130 MG/DL: CPT | Performed by: INTERNAL MEDICINE

## 2024-05-21 PROCEDURE — 3079F DIAST BP 80-89 MM HG: CPT | Performed by: INTERNAL MEDICINE

## 2024-05-21 PROCEDURE — 3052F HG A1C>EQUAL 8.0%<EQUAL 9.0%: CPT | Performed by: INTERNAL MEDICINE

## 2024-05-21 PROCEDURE — 99214 OFFICE O/P EST MOD 30 MIN: CPT | Performed by: INTERNAL MEDICINE

## 2024-05-21 NOTE — PATIENT INSTRUCTIONS
To reach Dr. Rodriguez's office please call [phone] ([facility]). Fax [phone]. Call [phone] to schedule an appointment. You may also contact the HF RNs at [email or web address]     Thank you for coming to your appointment today. If you have any questions or need cardiac medication refills, please call the Heart Failure Office at [phone] option 6.   You may also contact the HF RNs at [email or web address]      Please call and schedule a Metabolic Stress Test at [phone]   Schedule a follow up visit in 6 months

## 2024-05-21 NOTE — PROGRESS NOTES
Advanced Heart Failure and Cardiac Transplantation Cardiology    Jayde Daugherty is a 44 y.o. female from [place], [place], currently employed as a psych nurse. Lives with her three daughters (ages 9, 19, 23). Referred by Dr. Tony Bonds and Charlene DEVRIES for consultation.    Able to go to the gym and walk for 45 minutes, and lift weights. If she over exerts herself or feels anxiety / panic she feels palpitations and dyspnea.     Exam: /82 (BP Location: Right arm, Patient Position: Sitting, BP Cuff Size: Adult)   Pulse 78   Temp 36.4 °C (97.5 °F) (Temporal)   Wt 86.3 kg (190 lb 3.2 oz)   SpO2 95%   BMI 32.65 kg/m²   No JVD  RRR  CTA  No LE edema    Current Outpatient Medications   Medication Instructions    albuterol 90 mcg/actuation inhaler inhalation, Every 6 hours PRN    budesonide-formoteroL (Symbicort) 160-4.5 mcg/actuation inhaler inhalation, 2 times daily    clindamycin (Cleocin T) 1 % lotion 1 Application    clopidogrel (PLAVIX) 75 mg, oral, Daily    clotrimazole (Lotrimin) 1 % vaginal cream 1 applicator, vaginal, Daily    desipramine (NORPRAMIN) 10 mg, oral, Daily    DULoxetine (CYMBALTA) 30 mg, oral, 2 times daily    Farxiga 10 mg, oral, Daily    furosemide (LASIX) 40 mg, oral, As needed    gabapentin (NEURONTIN) 100 mg, oral, 2 times daily    metoprolol succinate XL (TOPROL-XL) 200 mg, oral, Daily    montelukast (SINGULAIR) 10 mg, oral, Daily    potassium chloride CR 10 mEq ER tablet 10 mEq, oral, As needed    rosuvastatin (CRESTOR) 40 mg, oral, Daily    sacubitriL-valsartan (Entresto)  mg tablet 1 tablet, oral, 2 times daily    sertraline (ZOLOFT) 100 mg, oral, Daily    spironolactone (Aldactone) 25 mg tablet 0.5 tablets, oral, Daily    warfarin (Coumadin) 5 mg tablet TAKE 1 TABLET BY MOUTH ONCE DAILY EXCEPT TAKE 1 AND 1/2 TABLETS BY MOUTH ON MONDAY    warfarin (COUMADIN) 7.5 mg, oral     Past medical history (non-cardiac):  -- Lupus (not currently on treatment), has had prior  lupus flares requiring steroids in past  -- Former smoker (25 year smoking history), quit 2016  -- Hypertension  -- Hyperlipidemia  -- DM2  -- Anxiety and depression  -- Chronic pain syndrome related to fibromyalgia and disk problems in back    Cardiovascular history:  -- Mixed non-ischemic (diagnosed 2005; peripartum) and Ischemic (heart attack in 2019) cardiomyopathy   -- History of anterior STEMI in setting of LV thrombus (now on warfarin, previously seen by Dr. Jayde Juárez)  -- No significant CAD  -- Paroxysmal atrial fibrillation  -- HFrEF, Stage C, NYHA class I currently  -- ICD    Assessment: HFrEF Stage C, NYHA class I, on excellent GDMT    Plan:  -- Metabolic stress test to establish baseline  -- No other changes recommended at this time    David Rodriguez MD, MPH  Advanced Heart Failure and Transplant Cardiology  [facility] [place]  [facility]

## 2024-05-23 ENCOUNTER — TELEPHONE (OUTPATIENT)
Dept: CARDIOLOGY | Facility: CLINIC | Age: 44
End: 2024-05-23

## 2024-06-10 NOTE — TELEPHONE ENCOUNTER
Left message asking to r/s appt from 5/23. If no call back by end of week , send missed appt letter.

## 2024-06-15 ENCOUNTER — ANTICOAGULATION - WARFARIN VISIT (OUTPATIENT)
Dept: CARDIOLOGY | Facility: CLINIC | Age: 44
End: 2024-06-15
Payer: MEDICAID

## 2024-06-15 DIAGNOSIS — I48.0 PAROXYSMAL ATRIAL FIBRILLATION (MULTI): Primary | ICD-10-CM

## 2024-06-18 ENCOUNTER — ANTICOAGULATION - WARFARIN VISIT (OUTPATIENT)
Dept: CARDIOLOGY | Facility: CLINIC | Age: 44
End: 2024-06-18
Payer: MEDICAID

## 2024-06-18 ENCOUNTER — TELEPHONE (OUTPATIENT)
Dept: CARDIOLOGY | Facility: CLINIC | Age: 44
End: 2024-06-18
Payer: MEDICAID

## 2024-06-18 DIAGNOSIS — I48.0 PAROXYSMAL ATRIAL FIBRILLATION (MULTI): Primary | ICD-10-CM

## 2024-06-20 ENCOUNTER — OFFICE VISIT (OUTPATIENT)
Dept: OBSTETRICS AND GYNECOLOGY | Facility: CLINIC | Age: 44
End: 2024-06-20
Payer: MEDICAID

## 2024-06-20 ENCOUNTER — ANTICOAGULATION - WARFARIN VISIT (OUTPATIENT)
Dept: CARDIOLOGY | Facility: CLINIC | Age: 44
End: 2024-06-20
Payer: MEDICAID

## 2024-06-20 VITALS
WEIGHT: 194.8 LBS | BODY MASS INDEX: 33.26 KG/M2 | HEART RATE: 69 BPM | HEIGHT: 64 IN | SYSTOLIC BLOOD PRESSURE: 130 MMHG | DIASTOLIC BLOOD PRESSURE: 95 MMHG

## 2024-06-20 DIAGNOSIS — N32.81 OAB (OVERACTIVE BLADDER): ICD-10-CM

## 2024-06-20 DIAGNOSIS — N39.3 SUI (STRESS URINARY INCONTINENCE, FEMALE): ICD-10-CM

## 2024-06-20 DIAGNOSIS — I48.0 PAROXYSMAL ATRIAL FIBRILLATION (MULTI): Primary | ICD-10-CM

## 2024-06-20 DIAGNOSIS — R15.9 INCONTINENCE OF FECES, UNSPECIFIED FECAL INCONTINENCE TYPE: ICD-10-CM

## 2024-06-20 DIAGNOSIS — N90.89 VULVAR IRRITATION: ICD-10-CM

## 2024-06-20 DIAGNOSIS — K60.2 PERIANAL FISSURE: ICD-10-CM

## 2024-06-20 DIAGNOSIS — E11.9 TYPE 2 DIABETES MELLITUS WITHOUT COMPLICATION, UNSPECIFIED WHETHER LONG TERM INSULIN USE (MULTI): ICD-10-CM

## 2024-06-20 DIAGNOSIS — N81.89 OTHER FEMALE GENITAL PROLAPSE: ICD-10-CM

## 2024-06-20 DIAGNOSIS — N39.46 MIXED STRESS AND URGE INCONTINENCE: Primary | ICD-10-CM

## 2024-06-20 DIAGNOSIS — N39.46 URINARY INCONTINENCE, MIXED: ICD-10-CM

## 2024-06-20 PROBLEM — N81.9 PROLAPSE OF FEMALE PELVIC ORGANS: Status: ACTIVE | Noted: 2024-06-20

## 2024-06-20 LAB
POC APPEARANCE, URINE: CLEAR
POC BILIRUBIN, URINE: NEGATIVE
POC BLOOD, URINE: NEGATIVE
POC COLOR, URINE: YELLOW
POC GLUCOSE, URINE: NEGATIVE MG/DL
POC INR: 1.2
POC KETONES, URINE: NEGATIVE MG/DL
POC LEUKOCYTES, URINE: NEGATIVE
POC NITRITE,URINE: NEGATIVE
POC PH, URINE: 6 PH
POC PROTEIN, URINE: ABNORMAL MG/DL
POC PROTHROMBIN TIME: NORMAL
POC SPECIFIC GRAVITY, URINE: 1.02
POC UROBILINOGEN, URINE: 1 EU/DL

## 2024-06-20 PROCEDURE — 99211 OFF/OP EST MAY X REQ PHY/QHP: CPT

## 2024-06-20 PROCEDURE — 85610 PROTHROMBIN TIME: CPT | Mod: QW

## 2024-06-20 PROCEDURE — 99215 OFFICE O/P EST HI 40 MIN: CPT | Mod: GC | Performed by: OBSTETRICS & GYNECOLOGY

## 2024-06-20 PROCEDURE — 81003 URINALYSIS AUTO W/O SCOPE: CPT | Performed by: OBSTETRICS & GYNECOLOGY

## 2024-06-20 PROCEDURE — 3050F LDL-C >= 130 MG/DL: CPT | Performed by: OBSTETRICS & GYNECOLOGY

## 2024-06-20 PROCEDURE — 99215 OFFICE O/P EST HI 40 MIN: CPT | Performed by: OBSTETRICS & GYNECOLOGY

## 2024-06-20 PROCEDURE — 3075F SYST BP GE 130 - 139MM HG: CPT | Performed by: OBSTETRICS & GYNECOLOGY

## 2024-06-20 PROCEDURE — 3080F DIAST BP >= 90 MM HG: CPT | Performed by: OBSTETRICS & GYNECOLOGY

## 2024-06-20 PROCEDURE — 3052F HG A1C>EQUAL 8.0%<EQUAL 9.0%: CPT | Performed by: OBSTETRICS & GYNECOLOGY

## 2024-06-20 RX ORDER — VIBEGRON 75 MG/1
75 TABLET, FILM COATED ORAL DAILY
Qty: 30 TABLET | Refills: 0 | Status: SHIPPED | OUTPATIENT
Start: 2024-06-20 | End: 2024-07-20

## 2024-06-20 ASSESSMENT — PAIN SCALES - GENERAL: PAINLEVEL: 0-NO PAIN

## 2024-06-20 NOTE — PATIENT INSTRUCTIONS
We discussed lifestyle changes includin) Aiming to drink around 60 oz total of fluids per day   2) Avoiding bladder irritants such as caffeine, nicotine, artificial sweeteners   3) Stop drinking fluids 3 hours before bedtime   4) Timed voids every 2-3 hours.   5) start Gemtesa daily- if too expensive, call the office and let us know.    Fiber Therapy:    Fiber acts in the colon (large bowel) to make the stool soft.  If the stool is too hard, the fiber draws water in and makes it softer.  If the stool is too watery, it acts like a 'sponge' and soaks up the liquid.     Many foods contain fiber. Fruits, vegetables, whole grains, and high fiber cereals all contain some fiber. Unfortunately, most of us don’t eat enough and a fiber supplement is a good way to increase soluble fiber intake.     Supplemental fiber comes in many forms. You could choose from:    1.   Powder  2.   Tablets  3.   Wafers/Cookies/Gummies    Some common brands include:    1.   Benefiber (tasteless powder)  2.   Metamucil (powder)  3.   Konsyl (powder)  4.   Citrucel (powder, may cause less gas)  5.   Fiber-Con (tablet)    All of these products work in the same way, but some may work better than others for you.    Dosin.   One tablespoon of powder dissolves in 8-16 oz of water or juice OR  2.   Two tablets with 8-16 oz water or juice OR  3.   Two fiber wafers/cookies with 8-16 oz of water or juice    Take fiber in the morning. Start off using it once per day. You can then increase frequency if needed.    IF FIBER IS NOT TAKEN WITH ADEQUATE WATER/FLUID INTAKE, IT MAY BE CONSTIPATING.  DRINK 6-8 GLASSES OF WATER/LIQUIDS THROUGHOUT THE DAY WHILE TAKING FIBER SUPPLEMENTATION.    Fiber can cause gas/bloating, especially when first starting the treatment.  If this is the case, you can take simethicone (Gas-X) over the counter after meals and at bedtime as needed.    Summary:  1.  Remember: the most common cause of constipation is inadequate  water intake during the day. Avoiding dehydration is usually the key to success with fiber supplementation.  2. Using fiber requires some experimentation- if one brand doesn’t work or causes excessive gas, take another. Also, try varying the form of fiber- for example, if the powder is unpalatable; take the tablets or wafers instead.  3. You may need more than one dose per day- feel free to increase your dose to morning and mid-day if that works better.  4. Results depend on consistency of usage- the more consistent you are in taking fiber, the better the results!         SOME SUGGESTED VULVAR PAIN & ITCHING MEASURES     The vulva is the external genitalia in the female. The skin of the vulva can be quite sensitive. Because it is moist and frequently subjected to friction while sitting and moving, this area can be easily injured. There are various strategies that can be used to prevent irritation and allow the vulva to heal. Keeping this area dry can accelerate healing. Chemicals found in toilet tissues, laundry soaps and detergents that come in contact with the vulva can cause irritation.   Avoiding contact with potential irritants that contain chemicals is important. Fabric softeners in undergarments, chemicals in deodorant soaps, bubble baths, feminine hygiene spray and panty liners etc., can all cause irritation to the vulva. The following recommendations are specific measures that can help minimize vulvar irritation.   Wear white 100% cotton underwear and do not wear underwear at night. Do not wear pantyhose, tights, or other close-fitting clothes. Enclosing this area with synthetic fibers holds both heat and moisture in the skin, conditions which potentiate the development of infections.   Tight-fitting clothes may also increase your symptoms of discomfort.   After washing underwear, put it through at least one whole cycle with water only. Some women have suffered needlessly from irritants in detergents whose  residue was left in clothes by incomplete rinsing. Rinsing clothes thoroughly is more important than which detergent is used although to be on the safe side, the milder the soap, the better. Wash new underwear before wearing. Fabric softeners and dryer sheets should not be used.   Rinse skin off with plain water frequently. Use tap water, distilled water, sitz baths, squirt bottles, or bidets. Additionally, hand held showers can be helpful for rinsing the vulva. After rinsing, pat the skin gently dry.   If the anus is irritated, consider cutting white flannel squares and placing the square in warm water. Wipe the anus with the wet flannel and discard or wash the flannel.   Use very mild soap for bathing. It is best not to use any soaps on the vulva. The vulva should be rinsed with warm water. Unscented soaps or soaps for sensitive skin are best to use on the body. Special soap products can be found at pharmacies or health food stores. Remember that frequent baths with soaps may increase the irritation.   A compress of oiled Aveeno (a powdered oatmeal bath treatment) has been recommended by some. It is placed over the vulva three to four times a day. Put two tablespoons of Aveeno in one quart of water. Mix in a jar and refrigerate. This is often helpful after intercourse or when symptoms of burning and itching are present.   Some patients find the use of cool gel packs on the vulva to be helpful.   Do not use products with benzocaine.   Consider using 100% cotton menstrual pads and tampons. Many women with vulvar pain experience a significant increase in irritation and pain every month when they use commercial paper pads or tampons. This monthly increase in pain can often be reduced by using 100% washable and reusable cotton menstrual pads. Pure cotton tampons are available.   Don’t sit or remain in a wet bathing suit for prolonged periods.   Additionally, it is often recommended that the vulva is left uncovered at  night (i.e. no underwear) to allow adequate exposure to the air.       Adapted From:   The Vulvar Pain Foundation, “Natural and Prophylactic Measures Suggested”, Vulvar Pain   Newsletter 1993: Sprin-6   The Interstitial Cystitis Association Vulvar Pain handout     -start fiber powder once-twice daily   -continue flax seed   -increase water intake   -start miralax nightly   -warm baths or showers after bm   -avoid straining and lingering on toilet   -start topical calcium channel blocker twice daily

## 2024-06-20 NOTE — PROGRESS NOTES
[facility] Department of Urogynecology   Yara Quiroga MD, MPH   [phone]    ASSESSMENT AND PLAN:   45 yo  with mixed urinary incontinence, fecal incontinence, stage 2-3 uterovaginal prolapse, and anal fissure. Hx of HF- 25% ejection fraction.    Diagnoses:  #1 UUI  #2 DION  #3 POP  #4 Fecal incontinence  #5 Anal fissure    Plan:  UUI  - Discussed the options of PFPT, medications, bladder Botox injections, and sacral neuromodulation.   - Discussed recommendation to start with more conservative management vs surgical management given cardiac history and EF of 25% with plan to revisit surgical options in the future if symptoms not improved.   - PFPT requisition sent today and gave her the list of local physical therapists with their corresponding locations/contact information.  - Rx for Gemtesa sent to pharmacy. Avoid mirabegron due to possible effect on BP.    2. DION  - We discussed treatment options for DION including expectant management, PFPT, pessary use, urethral bulking, and surgical management with a mid urethral sling.   - Referral placed to PFPT.     3. POP  - Stage 2-3 cystocele and rectocele  - Discussed PFPT, Pessary, and surgery  - Will plan for PFPT as above.     4. Fecal incontinence  - Experiencing 2-3 times a month. Encouraged 35 mg fiber daily given report of alternation of loose and hard stools.     5. Anal fissure  - Reviewed vulvar care- warm water soaks, avoid soap, use Aquaphor or Vaseline.  - Avoid constipation.    Return in 1 month to assess the effectiveness of Gemtesa and other interventions with Dr. Quiroga.     Scribe Attestation  By signing my name below, Benji DEWEY Scribe, attest that this documentation has been prepared under the direction and in the presence of Yara Quiroga MD MPH on 2024 at 1:01 PM.        Problem List Items Addressed This Visit       Prolapse of female pelvic organs    Relevant Orders    POCT UA Automated manually resulted  (Completed)    Referral to Physical Therapy    Urinary incontinence, mixed    Perianal fissure     Other Visit Diagnoses       Mixed stress and urge incontinence    -  Primary    Relevant Medications    vibegron (Gemtesa) 75 mg tablet    Other Relevant Orders    Referral to Physical Therapy    Incontinence of feces, unspecified fecal incontinence type        Relevant Orders    Referral to Physical Therapy    Vulvar irritation        Type 2 diabetes mellitus without complication, unspecified whether long term insulin use (Multi)        OAB (overactive bladder)        Relevant Medications    vibegron (Gemtesa) 75 mg tablet    DION (stress urinary incontinence, female)               I spent a total of 60 minutes in face to face and non face to face time.     I saw and evaluated the patient. I personally obtained the key and critical portions of the history and physical exam or was physically present for key and critical portions performed by the resident/fellow. I reviewed the resident/fellow's documentation and discussed the patient with the resident/fellow. I agree with the resident/fellow's medical decision making as documented in the note.    Yara Quiroga MD MPH       Yara Quiroga MD, MPH, FACOG     New    HISTORY OF PRESENT ILLNESS:   43 yo  presenting with c/o urinary and fecal incontinence and pelvic organ prolapse.     Bladder sx  Reporting urinary leakage starting after last delivery.  () > c section x1 ()   -  c/b uterine rupture and PPH with subsequent supracervical hysterectomy. Vaginal deliveries non operative.  - With the first delivery, had a perineal laceration was close to the anus, unsure of the degree.   -Feels the strong urge to go to the bathroom as soon as she stands up, worsens when she gets to the bathroom door with subsequent urinary leakage.  - Drinks about 84 ounces of water a day.   -Stops drinking water by the end of her work day and then sips with her  medications around 10 pm.   -Having to get up every hour to use the restroom.   -Also having urinary leakage with jumping and sneezing.     Bowel sx  Also having fecal urgency and some bowel leakage 2-3 times a month. Bowel movements alternating from soft to hard. Having bowel movements 2-3 times a day. Thinks she may have a fissure near anus. Has been noticing some blood on the toilet tissues every time she wipes.  Has tried PFPT, but couldn't continue given conflicting work schedule.     Prolapse sx:  Starting noticing prolapse earlier in the year, protruding down to introitus. Has been having lots of pelvic pressure since the last delivery that feels like something is falling out of vagina. Around 2018, had one episode of spotting and another episode this year. Currently not sexually active. Denies h/o pain with sex.     Notably, she does have a complicated medical history including SLE and MI x 2 with significant apical involvement and reduced EF most recently 25% approx. 2 months prior though ECHO result is not currently available for review.     Had peripartum supracervical hyst in         Past Medical History:       Past Medical History:   Diagnosis Date    Myocardial infarction (Multi)     x 2 with significant apical involvement    Personal history of nicotine dependence 06/10/2019    Former cigarette smoker    Personal history of other diseases of the circulatory system 2014    History of atrial fibrillation    Personal history of other diseases of the respiratory system 2022    History of acute bronchitis    Systemic lupus erythematosus, unspecified (Multi) 2013    Systemic lupus erythematosus        Past Surgical History:       Past Surgical History:   Procedure Laterality Date    CERVICAL BIOPSY  W/ LOOP ELECTRODE EXCISION  2006    Cervical Loop Electrosurgical Excision (LEEP)     SECTION, LOW TRANSVERSE      HYSTERECTOMY  2014    peripartum, supracervical, at [facility]     MR HEAD ANGIO WO IV CONTRAST  09/01/2013    MR HEAD ANGIO WO IV CONTRAST [place] [facility]    MR NECK ANGIO WO IV CONTRAST  09/01/2013    MR NECK ANGIO WO IV CONTRAST [place] [facility] [facility]    OTHER SURGICAL HISTORY  09/27/2019    Cardioverter defibrillator insertion    TOTAL HIP ARTHROPLASTY Bilateral 03/11/2014    Hip Replacement         Medications:       Prior to Admission medications    Medication Sig Start Date End Date Taking? Authorizing Provider   albuterol 90 mcg/actuation inhaler Inhale every 6 hours if needed. 5/16/19   Historical Provider, MD   budesonide-formoteroL (Symbicort) 160-4.5 mcg/actuation inhaler Inhale twice a day. 5/7/20   Historical Provider, MD   clindamycin (Cleocin T) 1 % lotion 1 Application 4/19/19   Historical Provider, MD   clopidogrel (Plavix) 75 mg tablet Take 1 tablet (75 mg) by mouth once daily.    Historical Provider, MD   desipramine (Norpramin) 10 mg tablet Take 1 tablet (10 mg) by mouth once daily.    Historical Provider, MD   DULoxetine (Cymbalta) 30 mg DR capsule Take 1 capsule (30 mg) by mouth 2 times a day.    Historical Provider, MD   Farxiga 10 mg Take 1 tablet (10 mg) by mouth once daily.    Historical Provider, MD   furosemide (Lasix) 40 mg tablet Take 1 tablet (40 mg) by mouth if needed.    Historical Provider, MD   gabapentin (Neurontin) 100 mg capsule Take 1 capsule (100 mg) by mouth 2 times a day. 10/26/23   Historical Provider, MD   metoprolol succinate XL (Toprol-XL) 200 mg 24 hr tablet Take 1 tablet (200 mg) by mouth once daily.    Historical Provider, MD   montelukast (Singulair) 10 mg tablet Take 1 tablet (10 mg) by mouth once daily.    Historical Provider, MD   potassium chloride CR 10 mEq ER tablet Take 1 tablet (10 mEq) by mouth if needed.    Historical Provider, MD   rosuvastatin (Crestor) 40 mg tablet Take 1 tablet (40 mg) by mouth once daily.    Historical Provider, MD   sacubitriL-valsartan (Entresto)  mg tablet Take 1 tablet  "by mouth 2 times a day. 4/17/24 4/17/25  Charlene Nathan, APRN-CNP   sertraline (Zoloft) 100 mg tablet Take 1 tablet (100 mg) by mouth once daily.    Historical Provider, MD   spironolactone (Aldactone) 25 mg tablet Take 0.5 tablets (12.5 mg) by mouth once daily. 11/25/20   Historical Provider, MD   warfarin (Coumadin) 5 mg tablet TAKE 1 TABLET BY MOUTH ONCE DAILY EXCEPT TAKE 1 AND 1/2 TABLETS BY MOUTH ON MONDAY    Historical Provider, MD   warfarin (Coumadin) 7.5 mg tablet Take 1 tablet (7.5 mg) by mouth.    Historical Provider, MD        ROS  Review of Systems   All other systems reviewed and are negative.         PHYSICAL EXAM:      BP (!) 130/95   Pulse 69   Ht 1.626 m (5' 4\")   Wt 88.4 kg (194 lb 12.8 oz)   BMI 33.44 kg/m²      No LMP recorded. Patient has had a hysterectomy.      Declines chaperone for physical exam.      Well developed, well nourished, in no apparent distress.   Neurologic/Psychiatric:  Awake, Alert and Oriented times 3.  Affect normal.     GENITAL/URINARY:       External Genitalia:  The patient has normal appearing external genitalia, normal skenes and bartholins glands, and a normal hair distribution.  Her vulva is without lesions, erythema or discharge.  It is non-tender with appropriate sensation.     Urethral Meatus:  Size normal, Location normal, Lesions absent, Prolapse absent,      Urethra:  Fullness absent, Masses absent,      Bladder:  Fullness absent, Masses absent, Tenderness absent,      Vagina:  General appearance normal, Estrogen effect normal, Discharge absent, Lesions absent    Cervix: Normal, no discharge.   Uterus:  surgically absent  Adnexa:   deferred    Anus/Perineum:  Lesions absent and Masses absent normal sphincter tone, no lesions, defer exam; non bleeding, 3 cm external hemorrhoid, 4 mm fissure at the right of the hemorrhoid     Stress urinary incontinence not demonstrable.       Physical Exam  Genitourinary:   POP-Q measurements were:      Aa: 0, Ba: 0, C: " -2     gH: pB: TVL:      Ap: 0, Bp: 0, D: -5

## 2024-06-20 NOTE — PROGRESS NOTES
Patient identification verified with 2 identifiers.    Location: [facility] at [facility] - [address] [address] [phone] option #1     Referring Physician: DR. BRIDGETTE MCDANIEL  Enrollment/ Re-enrollment date: 2025   INR Goal: 2.0-3.0  INR monitoring is per [facility] protocol.  Anticoagulation Medication: warfarin  Indication: Atrial Fibrillation/Atrial Flutter    Subjective   Bleeding signs/symptoms: No    Bruising: No   Major bleeding event: No  Thrombosis signs/symptoms: No  Thromboembolic event: No  Missed doses: Yes  PT MISSED 2.5MG  Extra doses: No  Medication changes: Yes  PT TAKING MULPTIPLE SUPPLEMENTS WITH VIT K SINCE MAY. SEE PREVIOUS DOCUMENTATION  Dietary changes: No  Change in health: No  Change in activity: No  Alcohol: No  Other concerns: No    Upcoming Procedures:  Does the Patient Have any upcoming procedures that require interruption in anticoagulation therapy? no  Does the patient require bridging? no      Anticoagulation Summary  As of 2024      INR goal:  2.0-3.0   TTR:  55.6% (8.7 mo)   INR used for dosin.20 (2024)   Weekly warfarin total:  32.5 mg               Assessment/Plan   Subtherapeutic     1. New dose:  TWD INCREASED PER PROTOCOL     2. Next INR:  24      Education provided to patient during the visit:  Patient instructed to call in interim with questions, concerns and changes.   Patient educated on interactions between medications and warfarin.   Patient educated on dietary consistency in vitamin k consumption.   Patient educated on compliance with dosing, follow up appointments, and prescribed plan of care.

## 2024-06-24 ENCOUNTER — APPOINTMENT (OUTPATIENT)
Dept: CARDIOLOGY | Facility: CLINIC | Age: 44
End: 2024-06-24
Payer: MEDICAID

## 2024-06-26 ENCOUNTER — APPOINTMENT (OUTPATIENT)
Dept: CARDIOLOGY | Facility: CLINIC | Age: 44
End: 2024-06-26
Payer: MEDICAID

## 2024-06-27 ENCOUNTER — ANTICOAGULATION - WARFARIN VISIT (OUTPATIENT)
Dept: CARDIOLOGY | Facility: CLINIC | Age: 44
End: 2024-06-27
Payer: MEDICAID

## 2024-06-27 ENCOUNTER — HOSPITAL ENCOUNTER (OUTPATIENT)
Dept: CARDIOLOGY | Facility: CLINIC | Age: 44
Discharge: HOME | End: 2024-06-27
Payer: MEDICAID

## 2024-06-27 DIAGNOSIS — Z95.810 CARDIAC DEFIBRILLATOR IN PLACE: ICD-10-CM

## 2024-06-27 DIAGNOSIS — I50.9 CONGESTIVE HEART FAILURE, UNSPECIFIED HF CHRONICITY, UNSPECIFIED HEART FAILURE TYPE (MULTI): ICD-10-CM

## 2024-06-27 DIAGNOSIS — I47.29 OTHER VENTRICULAR TACHYCARDIA (MULTI): ICD-10-CM

## 2024-06-27 DIAGNOSIS — I48.0 PAROXYSMAL ATRIAL FIBRILLATION (MULTI): Primary | ICD-10-CM

## 2024-06-27 DIAGNOSIS — Z45.02 IMPLANTABLE DEFIBRILLATOR REPROGRAMMING/CHECK: Primary | ICD-10-CM

## 2024-06-27 DIAGNOSIS — I48.0 PAROXYSMAL ATRIAL FIBRILLATION (MULTI): ICD-10-CM

## 2024-06-27 DIAGNOSIS — I25.5 ISCHEMIC CARDIOMYOPATHY: Primary | ICD-10-CM

## 2024-06-27 DIAGNOSIS — Z95.810 PRESENCE OF AUTOMATIC (IMPLANTABLE) CARDIAC DEFIBRILLATOR: ICD-10-CM

## 2024-06-27 LAB
POC INR: 1.2
POC PROTHROMBIN TIME: NORMAL

## 2024-06-27 PROCEDURE — 85610 PROTHROMBIN TIME: CPT | Mod: QW

## 2024-06-27 PROCEDURE — 93282 PRGRMG EVAL IMPLANTABLE DFB: CPT | Performed by: INTERNAL MEDICINE

## 2024-06-27 PROCEDURE — 99211 OFF/OP EST MAY X REQ PHY/QHP: CPT

## 2024-06-27 PROCEDURE — 93282 PRGRMG EVAL IMPLANTABLE DFB: CPT

## 2024-06-27 NOTE — PROGRESS NOTES
Patient identification verified with 2 identifiers.    Location: [facility] at [facility] - [address] [address] [phone] option #1     Referring Physician: DR. BRIDGETTE MCDANIEL  Enrollment/ Re-enrollment date: 2025   INR Goal: 2.0-3.0  INR monitoring is per [facility] protocol.  Anticoagulation Medication: warfarin  Indication: Atrial Fibrillation/Atrial Flutter    Subjective   Bleeding signs/symptoms: No    Bruising: No   Major bleeding event: No  Thrombosis signs/symptoms: No  Thromboembolic event: No  Missed doses: No  Extra doses: No  Medication changes: No  PT STOPPING SUPPLEMENTS WITH VIT K AND START TAKING BENOFIBRE  Dietary changes: No  PT ATE KALE TWICE IN THE LAST 2 DAYS  Change in health: No  Change in activity: No  Alcohol: No  Other concerns: No    Upcoming Procedures:  Does the Patient Have any upcoming procedures that require interruption in anticoagulation therapy? no  Does the patient require bridging? no      Anticoagulation Summary  As of 2024      INR goal:  2.0-3.0   TTR:  54.1% (8.9 mo)   INR used for dosin.20 (2024)   Weekly warfarin total:  40 mg               Assessment/Plan   Subtherapeutic     1. New dose:  TWD INCREASED. PT ATE KALE THE LAST 2 DAYS. PT IS GOING TO STOP TAKING SUPPLEMENTS THAT CONTAIN VIT K AND IS GOING TO START TAKING BENOFIBRE.  PT ADVISED TO RTC 24 BUT DUE TO WORK IS UNABLE TO RTC UNTIL 24   2. Next INR:  1 WEEK      Education provided to patient during the visit:  Patient instructed to call in interim with questions, concerns and changes.   Patient educated on interactions between medications and warfarin.   Patient educated on dietary consistency in vitamin k consumption.   Patient educated on compliance with dosing, follow up appointments, and prescribed plan of care.

## 2024-07-01 ENCOUNTER — APPOINTMENT (OUTPATIENT)
Dept: CARDIOLOGY | Facility: CLINIC | Age: 44
End: 2024-07-01
Payer: MEDICAID

## 2024-07-05 ENCOUNTER — TELEPHONE (OUTPATIENT)
Dept: CARDIOLOGY | Facility: CLINIC | Age: 44
End: 2024-07-05
Payer: MEDICAID

## 2024-07-09 ENCOUNTER — ANTICOAGULATION - WARFARIN VISIT (OUTPATIENT)
Dept: CARDIOLOGY | Facility: CLINIC | Age: 44
End: 2024-07-09
Payer: MEDICAID

## 2024-07-09 DIAGNOSIS — I48.0 PAROXYSMAL ATRIAL FIBRILLATION (MULTI): Primary | ICD-10-CM

## 2024-07-11 ENCOUNTER — APPOINTMENT (OUTPATIENT)
Dept: CARDIOLOGY | Facility: CLINIC | Age: 44
End: 2024-07-11
Payer: MEDICAID

## 2024-07-11 ENCOUNTER — ANTICOAGULATION - WARFARIN VISIT (OUTPATIENT)
Dept: CARDIOLOGY | Facility: CLINIC | Age: 44
End: 2024-07-11
Payer: MEDICAID

## 2024-07-11 DIAGNOSIS — I48.0 PAROXYSMAL ATRIAL FIBRILLATION (MULTI): Primary | ICD-10-CM

## 2024-07-11 LAB
POC INR: 8
POC PROTHROMBIN TIME: NORMAL

## 2024-07-11 PROCEDURE — 99211 OFF/OP EST MAY X REQ PHY/QHP: CPT

## 2024-07-11 PROCEDURE — 85610 PROTHROMBIN TIME: CPT | Mod: QW

## 2024-07-11 NOTE — PROGRESS NOTES
Patient identification verified with 2 identifiers.    Location: [facility] at [facility] - [address] [address] [phone] option #1     Referring Physician: Dr. Migue Bonds  Enrollment/ Re-enrollment date: 25   INR Goal: 2.0-3.0  INR monitoring is per [facility] protocol.  Anticoagulation Medication: warfarin  Indication: Atrial Fibrillation/Atrial Flutter    Subjective   Bleeding signs/symptoms: No    Bruising: Yes  c/o more frequent bruising.   Major bleeding event: No  Thrombosis signs/symptoms: No  Thromboembolic event: No  Missed doses: Yes  did not take warfarin 7/10/24  Extra doses: No  Medication changes: Yes  Patient has been taking Ibuprofen every other day.  Dietary changes: Yes  did not have any Vitamin K  Change in health: No  Change in activity: No  Alcohol: No  Other concerns: No    Upcoming Procedures:  Does the Patient Have any upcoming procedures that require interruption in anticoagulation therapy? no  Does the patient require bridging? no      Anticoagulation Summary  As of 2024      INR goal:  2.0-3.0   TTR:  52.1% (9.4 mo)   INR used for dosin.00 (2024)   Weekly warfarin total:  40 mg               Assessment/Plan   Supratherapeutic.  Paged Dr. Bonds.  He returned call and read back critical INR result.  Notified of patient symptoms. Dr. Bonds advised to have patient to hold warfarin 4 days and RTC on Monday.     1. New dose:  Hold dose 4 days.     2. Next INR:  4 days.       Education provided to patient during the visit:  Patient instructed to call in interim with questions, concerns and changes.   Patient educated on interactions between medications and warfarin.   Patient educated on dietary consistency in vitamin k consumption.   Patient educated on affects of alcohol consumption while taking warfarin.   Patient educated on signs of bleeding/clotting.   Patient educated on compliance with dosing, follow up appointments,  and prescribed plan of care.    Patient advised to seek emergency attention for any signs of bleeding or severe headache.

## 2024-07-14 LAB
ATRIAL RATE: 74 BPM
P AXIS: 59 DEGREES
P OFFSET: 192 MS
P ONSET: 134 MS
PR INTERVAL: 158 MS
Q ONSET: 213 MS
QRS COUNT: 12 BEATS
QRS DURATION: 102 MS
QT INTERVAL: 426 MS
QTC CALCULATION(BAZETT): 472 MS
QTC FREDERICIA: 457 MS
R AXIS: -62 DEGREES
T AXIS: -57 DEGREES
T OFFSET: 426 MS
VENTRICULAR RATE: 74 BPM

## 2024-07-15 ENCOUNTER — ANTICOAGULATION - WARFARIN VISIT (OUTPATIENT)
Dept: CARDIOLOGY | Facility: CLINIC | Age: 44
End: 2024-07-15
Payer: MEDICAID

## 2024-07-15 DIAGNOSIS — I48.0 PAROXYSMAL ATRIAL FIBRILLATION (MULTI): Primary | ICD-10-CM

## 2024-07-15 LAB
POC INR: 1.7
POC PROTHROMBIN TIME: NORMAL

## 2024-07-15 PROCEDURE — 85610 PROTHROMBIN TIME: CPT | Mod: QW

## 2024-07-15 PROCEDURE — 99211 OFF/OP EST MAY X REQ PHY/QHP: CPT

## 2024-07-15 NOTE — PROGRESS NOTES
Patient identification verified with 2 identifiers.    Location: [facility] - [address] [address] [phone] option #1     Referring Physician: flor lacey  Enrollment/ Re-enrollment date: 2024   INR Goal: 2.0-3.0  INR monitoring is per [facility] protocol.  Anticoagulation Medication: warfarin  Indication: Atrial Fibrillation/Atrial Flutter    Subjective   Bleeding signs/symptoms: No  DENIES    Bruising: No   Major bleeding event: No  Thrombosis signs/symptoms: No  Thromboembolic event: No  Missed doses: No  Extra doses: No  Medication changes: No  Dietary changes: No  Change in health: No  Change in activity: No  Alcohol: No  Other concerns: No    Upcoming Procedures:  Does the Patient Have any upcoming procedures that require interruption in anticoagulation therapy? no  Does the patient require bridging? no      Anticoagulation Summary  As of 7/15/2024      INR goal:  2.0-3.0   TTR:  51.6% (9.5 mo)   INR used for dosin.70 (7/15/2024)   Weekly warfarin total:  32.5 mg               Assessment/Plan   Subtherapeutic     1. New dose:  DECREASE     2. Next INR:  4 DAYS      Education provided to patient during the visit:  Patient instructed to call in interim with questions, concerns and changes.   Patient educated on interactions between medications and warfarin.   Patient educated on dietary consistency in vitamin k consumption.   Patient educated on affects of alcohol consumption while taking warfarin.   Patient educated on signs of bleeding/clotting.   Patient educated on compliance with dosing, follow up appointments, and prescribed plan of care.

## 2024-07-19 ENCOUNTER — ANTICOAGULATION - WARFARIN VISIT (OUTPATIENT)
Dept: CARDIOLOGY | Facility: CLINIC | Age: 44
End: 2024-07-19
Payer: MEDICAID

## 2024-07-19 DIAGNOSIS — I48.0 PAROXYSMAL ATRIAL FIBRILLATION (MULTI): Primary | ICD-10-CM

## 2024-07-19 LAB
POC INR: 2.3
POC PROTHROMBIN TIME: NORMAL

## 2024-07-19 PROCEDURE — 85610 PROTHROMBIN TIME: CPT | Mod: QW

## 2024-07-19 PROCEDURE — 99211 OFF/OP EST MAY X REQ PHY/QHP: CPT

## 2024-07-19 NOTE — PROGRESS NOTES
Patient identification verified with 2 identifiers.    Location: [facility] at [facility] - [address] [address] [phone] option #1     Referring Physician: flor lacey  Enrollment/ Re-enrollment date: 2024   INR Goal: 2.0-3.0  INR monitoring is per [facility] protocol.  Anticoagulation Medication: warfarin  Indication: Atrial Fibrillation/Atrial Flutter    Subjective   Bleeding signs/symptoms: No    Bruising: No   Major bleeding event: No  Thrombosis signs/symptoms: No  Thromboembolic event: No  Missed doses: No  Extra doses: No  Medication changes: No  Dietary changes: No  Change in health: No  Change in activity: No  Alcohol: No  Other concerns: No    Upcoming Procedures:  Does the Patient Have any upcoming procedures that require interruption in anticoagulation therapy? no  Does the patient require bridging? no      Anticoagulation Summary  As of 2024      INR goal:  2.0-3.0   TTR:  51.6% (9.7 mo)   INR used for dosin.30 (2024)   Weekly warfarin total:  32.5 mg               Assessment/Plan   THERAPEUTIC  MAINTAIN TWD  RTC IN 6 DAYS    Education provided to patient during the visit:  Patient instructed to call in interim with questions, concerns and changes.   Patient educated on interactions between medications and warfarin.   Patient educated on dietary consistency in vitamin k consumption.   Patient educated on affects of alcohol consumption while taking warfarin.   Patient educated on signs of bleeding/clotting.   Patient educated on compliance with dosing, follow up appointments, and prescribed plan of care.

## 2024-07-25 ENCOUNTER — ANTICOAGULATION - WARFARIN VISIT (OUTPATIENT)
Dept: CARDIOLOGY | Facility: CLINIC | Age: 44
End: 2024-07-25
Payer: MEDICAID

## 2024-07-25 DIAGNOSIS — I48.0 PAROXYSMAL ATRIAL FIBRILLATION (MULTI): Primary | ICD-10-CM

## 2024-07-25 LAB
POC INR: 2.8
POC PROTHROMBIN TIME: NORMAL

## 2024-07-25 PROCEDURE — 99211 OFF/OP EST MAY X REQ PHY/QHP: CPT

## 2024-07-25 PROCEDURE — 85610 PROTHROMBIN TIME: CPT | Mod: QW

## 2024-07-25 NOTE — PROGRESS NOTES
Patient identification verified with 2 identifiers.    Location: [facility] - [address] [address] [phone] option #1     Referring Physician: Dr. Migue Bonds  Enrollment/ Re-enrollment date: 25   INR Goal: 2.0-3.0  INR monitoring is per [facility] protocol.  Anticoagulation Medication: warfarin  Indication: Atrial Fibrillation/Atrial Flutter    Subjective   Bleeding signs/symptoms: No    Bruising: No   Major bleeding event: No  Thrombosis signs/symptoms: No  Thromboembolic event: No  Missed doses: No  Extra doses: No  Medication changes: No  Dietary changes: No  Change in health: No  Change in activity: No  Alcohol: No  Other concerns: No    Upcoming Procedures:  Does the Patient Have any upcoming procedures that require interruption in anticoagulation therapy? no  Does the patient require bridging? no      Anticoagulation Summary  As of 2024      INR goal:  2.0-3.0   TTR:  52.5% (9.9 mo)   INR used for dosin.80 (2024)   Weekly warfarin total:  32.5 mg               Assessment/Plan   Therapeutic     1. New dose: no change    2. Next INR:  10 days per patient availability.       Education provided to patient during the visit:  Patient instructed to call in interim with questions, concerns and changes.   Patient educated on interactions between medications and warfarin.   Patient educated on dietary consistency in vitamin k consumption.   Patient educated on affects of alcohol consumption while taking warfarin.   Patient educated on signs of bleeding/clotting.   Patient educated on compliance with dosing, follow up appointments, and prescribed plan of care.

## 2024-08-05 ENCOUNTER — ANTICOAGULATION - WARFARIN VISIT (OUTPATIENT)
Dept: CARDIOLOGY | Facility: CLINIC | Age: 44
End: 2024-08-05
Payer: MEDICAID

## 2024-08-05 DIAGNOSIS — I48.0 PAROXYSMAL ATRIAL FIBRILLATION (MULTI): Primary | ICD-10-CM

## 2024-08-05 LAB
POC INR: 4.5
POC PROTHROMBIN TIME: NORMAL

## 2024-08-05 PROCEDURE — 85610 PROTHROMBIN TIME: CPT | Mod: QW

## 2024-08-05 PROCEDURE — 99211 OFF/OP EST MAY X REQ PHY/QHP: CPT

## 2024-08-05 NOTE — PROGRESS NOTES
Patient identification verified with 2 identifiers.    Location: [facility] - [address] [address] [phone] option #1     Referring Physician: Dr. Migue Bonds  Enrollment/ Re-enrollment date: 25   INR Goal: 2.0-3.0  INR monitoring is per [facility] protocol.  Anticoagulation Medication: warfarin  Indication: Atrial Fibrillation/Atrial Flutter    Subjective   Bleeding signs/symptoms: No    Bruising: No   Major bleeding event: No  Thrombosis signs/symptoms: No  Thromboembolic event: No  Missed doses: Yes  missed dose  Extra doses: No  Medication changes: No  Dietary changes: No  Change in health: No  Change in activity: No  Alcohol: Yes  increased consumption of alcohol  Other concerns: No    Upcoming Procedures:  Does the Patient Have any upcoming procedures that require interruption in anticoagulation therapy? no  Does the patient require bridging? no      Anticoagulation Summary  As of 2024      INR goal:  2.0-3.0   TTR:  51.1% (10.2 mo)   INR used for dosin.50 (2024)   Weekly warfarin total:  32.5 mg               Assessment/Plan   Supra therapeutic in setting of increased alcohol consumption. Patient will not continue with this.  Will hold warfarin today and tomorrow and maintain weekly dose  RTC in 5 days      Education provided to patient during the visit:  Patient instructed to call in interim with questions, concerns and changes.   Patient educated on interactions between medications and warfarin.   Patient educated on dietary consistency in vitamin k consumption.   Patient educated on affects of alcohol consumption while taking warfarin.   Patient educated on signs of bleeding/clotting.   Patient educated on compliance with dosing, follow up appointments, and prescribed plan of care.

## 2024-08-09 DIAGNOSIS — I50.20 HFREF (HEART FAILURE WITH REDUCED EJECTION FRACTION) (MULTI): Primary | ICD-10-CM

## 2024-08-09 RX ORDER — DAPAGLIFLOZIN 10 MG/1
10 TABLET, FILM COATED ORAL DAILY
Qty: 90 TABLET | Refills: 3 | Status: SHIPPED | OUTPATIENT
Start: 2024-08-09 | End: 2025-08-09

## 2024-08-10 ENCOUNTER — ANTICOAGULATION - WARFARIN VISIT (OUTPATIENT)
Dept: CARDIOLOGY | Facility: CLINIC | Age: 44
End: 2024-08-10
Payer: MEDICAID

## 2024-08-10 DIAGNOSIS — I48.0 PAROXYSMAL ATRIAL FIBRILLATION (MULTI): Primary | ICD-10-CM

## 2024-08-10 LAB
POC INR: 1.2
POC PROTHROMBIN TIME: NORMAL

## 2024-08-10 PROCEDURE — 99211 OFF/OP EST MAY X REQ PHY/QHP: CPT

## 2024-08-10 PROCEDURE — 85610 PROTHROMBIN TIME: CPT | Mod: QW

## 2024-08-10 NOTE — PROGRESS NOTES
Patient identification verified with 2 identifiers.    Location: [facility] at [facility] - [address] [address] [phone] option #1     Referring Physician: Dr. Migue Bonds  Enrollment/ Re-enrollment date: 25   INR Goal: 2.0-3.0  INR monitoring is per [facility] protocol.  Anticoagulation Medication: warfarin  Indication: Atrial Fibrillation/Atrial Flutter    Subjective   Bleeding signs/symptoms: No    Bruising: No   Major bleeding event: No  Thrombosis signs/symptoms: No  Thromboembolic event: No  Missed doses: Yes  missed 2 doses in addition to 2 held doses  Extra doses: No  Medication changes: No  Dietary changes: No  Change in health: No  Change in activity: No  Alcohol: No  Other concerns: No    Upcoming Procedures:  Does the Patient Have any upcoming procedures that require interruption in anticoagulation therapy? no  Does the patient require bridging? no      Anticoagulation Summary  As of 8/10/2024      INR goal:  2.0-3.0   TTR:  50.7% (10.4 mo)   INR used for dosin.20 (8/10/2024)   Weekly warfarin total:  32.5 mg               Assessment/Plan   Sub therapeutic in setting of 2 missed doses in addition to 2 prior held doses  Will give one time dose increase then maintain weekly dose  RTC in 5 days      Education provided to patient during the visit:  Patient instructed to call in interim with questions, concerns and changes.   Patient educated on interactions between medications and warfarin.   Patient educated on dietary consistency in vitamin k consumption.   Patient educated on affects of alcohol consumption while taking warfarin.   Patient educated on signs of bleeding/clotting.   Patient educated on compliance with dosing, follow up appointments, and prescribed plan of care.

## 2024-08-15 ENCOUNTER — TELEPHONE (OUTPATIENT)
Dept: CARDIOLOGY | Facility: CLINIC | Age: 44
End: 2024-08-15
Payer: MEDICAID

## 2024-08-15 ENCOUNTER — APPOINTMENT (OUTPATIENT)
Dept: CARDIOLOGY | Facility: CLINIC | Age: 44
End: 2024-08-15
Payer: MEDICAID

## 2024-08-15 NOTE — TELEPHONE ENCOUNTER
Pt called to cancel appt today, 8/15.  She has conflict with work schedule.  Unable to come until 8/21.  Appt made for that day.

## 2024-08-16 ENCOUNTER — ANTICOAGULATION - WARFARIN VISIT (OUTPATIENT)
Dept: CARDIOLOGY | Facility: CLINIC | Age: 44
End: 2024-08-16
Payer: MEDICAID

## 2024-08-16 DIAGNOSIS — I48.0 PAROXYSMAL ATRIAL FIBRILLATION (MULTI): Primary | ICD-10-CM

## 2024-08-22 ENCOUNTER — TELEPHONE (OUTPATIENT)
Dept: CARDIOLOGY | Facility: CLINIC | Age: 44
End: 2024-08-22
Payer: MEDICAID

## 2024-08-28 ENCOUNTER — ANTICOAGULATION - WARFARIN VISIT (OUTPATIENT)
Dept: CARDIOLOGY | Facility: CLINIC | Age: 44
End: 2024-08-28
Payer: MEDICAID

## 2024-08-28 DIAGNOSIS — I48.0 PAROXYSMAL ATRIAL FIBRILLATION (MULTI): Primary | ICD-10-CM

## 2024-08-29 ENCOUNTER — ANTICOAGULATION - WARFARIN VISIT (OUTPATIENT)
Dept: CARDIOLOGY | Facility: CLINIC | Age: 44
End: 2024-08-29
Payer: MEDICAID

## 2024-08-29 DIAGNOSIS — I48.0 PAROXYSMAL ATRIAL FIBRILLATION (MULTI): Primary | ICD-10-CM

## 2024-08-29 LAB
POC INR: 2.7
POC PROTHROMBIN TIME: NORMAL

## 2024-08-29 PROCEDURE — 85610 PROTHROMBIN TIME: CPT | Mod: QW

## 2024-08-29 PROCEDURE — 99211 OFF/OP EST MAY X REQ PHY/QHP: CPT

## 2024-08-29 NOTE — PROGRESS NOTES
Patient identification verified with 2 identifiers.    Location: [facility] at [facility] - [address] [address] [phone] option #1     Referring Physician: Dr. Migue Bonds  Enrollment/ Re-enrollment date: 25   INR Goal: 2.0-3.0  INR monitoring is per [facility] protocol.  Anticoagulation Medication: warfarin  Indication: Atrial Fibrillation/Atrial Flutter    Subjective   Bleeding signs/symptoms: No    Bruising: No   Major bleeding event: No  Thrombosis signs/symptoms: No  Thromboembolic event: No  Missed doses: No  Extra doses: No  Medication changes: No  Dietary changes: No  Change in health: No  Change in activity: No  Alcohol: No  Other concerns: No    Upcoming Procedures:  Does the Patient Have any upcoming procedures that require interruption in anticoagulation therapy? no  Does the patient require bridging? no      Anticoagulation Summary  As of 2024      INR goal:  2.0-3.0   TTR:  50.5% (11 mo)   INR used for dosin.70 (2024)   Weekly warfarin total:  32.5 mg               Assessment/Plan   Therapeutic     1. New dose: no change    2. Next INR:  9/7      Education provided to patient during the visit:  Patient instructed to call in interim with questions, concerns and changes.   Patient educated on interactions between medications and warfarin.   Patient educated on dietary consistency in vitamin k consumption.   Patient educated on affects of alcohol consumption while taking warfarin.   Patient educated on signs of bleeding/clotting.   Patient educated on compliance with dosing, follow up appointments, and prescribed plan of care.

## 2024-09-07 ENCOUNTER — ANTICOAGULATION - WARFARIN VISIT (OUTPATIENT)
Dept: CARDIOLOGY | Facility: CLINIC | Age: 44
End: 2024-09-07
Payer: MEDICAID

## 2024-09-07 DIAGNOSIS — I48.0 PAROXYSMAL ATRIAL FIBRILLATION (MULTI): Primary | ICD-10-CM

## 2024-09-07 LAB
POC INR: 3.5
POC PROTHROMBIN TIME: NORMAL

## 2024-09-07 PROCEDURE — 85610 PROTHROMBIN TIME: CPT | Mod: QW

## 2024-09-07 PROCEDURE — 99211 OFF/OP EST MAY X REQ PHY/QHP: CPT

## 2024-09-07 NOTE — PROGRESS NOTES
Patient identification verified with 2 identifiers.    Location: [facility] at [facility] - [address] [address] [phone] option #1     Referring Physician: Dr. Migue Bonds  Enrollment/ Re-enrollment date: 2/26/25   INR Goal: 2.0-3.0  INR monitoring is per [facility] protocol.  Anticoagulation Medication: warfarin  Indication: Atrial Fibrillation/Atrial Flutter    Subjective   Bleeding signs/symptoms: No    Bruising: No   Major bleeding event: No  Thrombosis signs/symptoms: No  Thromboembolic event: No  Missed doses: No  Extra doses: No  Medication changes: Yes  STARTED TRULICITY  Dietary changes: No  Change in health: No  Change in activity: No  Alcohol: No  Other concerns: No    Upcoming Procedures:  Does the Patient Have any upcoming procedures that require interruption in anticoagulation therapy? no  Does the patient require bridging? no      Anticoagulation Summary  As of 9/7/2024      INR goal:  2.0-3.0   TTR:  50.2% (11.3 mo)   INR used for dosing:  3.50 (9/7/2024)   Weekly warfarin total:  32.5 mg               Assessment/Plan   SUPRA THERAPEUTIC  HOLD TODAY'S DOSE THEN MAINTAIN TWD  RTC IN 5 DAYS          Education provided to patient during the visit:  Patient instructed to call in interim with questions, concerns and changes.   Patient educated on interactions between medications and warfarin.   Patient educated on dietary consistency in vitamin k consumption.   Patient educated on affects of alcohol consumption while taking warfarin.   Patient educated on signs of bleeding/clotting.   Patient educated on compliance with dosing, follow up appointments, and prescribed plan of care.

## 2024-09-12 ENCOUNTER — ANTICOAGULATION - WARFARIN VISIT (OUTPATIENT)
Dept: CARDIOLOGY | Facility: CLINIC | Age: 44
End: 2024-09-12
Payer: MEDICAID

## 2024-09-12 DIAGNOSIS — I48.0 PAROXYSMAL ATRIAL FIBRILLATION (MULTI): Primary | ICD-10-CM

## 2024-09-12 LAB
POC INR: 2.8
POC PROTHROMBIN TIME: NORMAL

## 2024-09-12 PROCEDURE — 85610 PROTHROMBIN TIME: CPT | Mod: QW

## 2024-09-12 PROCEDURE — 99211 OFF/OP EST MAY X REQ PHY/QHP: CPT

## 2024-09-12 NOTE — PROGRESS NOTES
Patient identification verified with 2 identifiers.    Location: [facility] at [facility] - [address] [address] [phone] option #1     Referring Physician: DR. BRIDGETTE MCDANIEL  Enrollment/ Re-enrollment date: 2025   INR Goal: 2.0-3.0  INR monitoring is per [facility] protocol.  Anticoagulation Medication: warfarin  Indication: Atrial Fibrillation/Atrial Flutter    Subjective   Bleeding signs/symptoms: No    Bruising: No   Major bleeding event: No  Thrombosis signs/symptoms: No  Thromboembolic event: No  Missed doses: Yes  Extra doses: Yes  PT TOOK 3.75MG  INSTEAD OF 2.5MG AND 7.5MG  IN STEAD OF 5MG  Medication changes: No  Dietary changes: No  Change in health: No  Change in activity: No  Alcohol: No  Other concerns: No    Upcoming Procedures:  Does the Patient Have any upcoming procedures that require interruption in anticoagulation therapy? no  Does the patient require bridging? no      Anticoagulation Summary  As of 2024      INR goal:  2.0-3.0   TTR:  49.9% (11.5 mo)   INR used for dosin.80 (2024)   Weekly warfarin total:  32.5 mg               Assessment/Plan   Therapeutic     1. New dose:  PT STATES SHE WAS RUNNING OUT OF 5MG TABLETS SO SHE CALLED HER PCP DR. WARREN FOR A REFILL AND 7.5MG TABLETS WERE REFRILLE SO . PT TOOK 3.75MG  INSTEAD OF 2.5MG AND TOOK 7.5MG YESTERDAY INSTEAD OF 5MG. SHE IS COMPLETELY OUT OF 5MG TABLETS TODAY. PT REQUESTING A REFILL OF 5MG TABLETS DIRECTLY TO [facility]. WILL MAINTAIN TWD OF 32.5 AND PT WILL RTC       2. Next INR:  24      Education provided to patient during the visit:  Patient instructed to call in interim with questions, concerns and changes.   Patient educated on compliance with dosing, follow up appointments, and prescribed plan of care.

## 2024-09-16 ENCOUNTER — ANTICOAGULATION - WARFARIN VISIT (OUTPATIENT)
Dept: CARDIOLOGY | Facility: CLINIC | Age: 44
End: 2024-09-16
Payer: MEDICAID

## 2024-09-16 ENCOUNTER — TELEPHONE (OUTPATIENT)
Dept: CARDIOLOGY | Facility: CLINIC | Age: 44
End: 2024-09-16
Payer: MEDICAID

## 2024-09-16 ENCOUNTER — APPOINTMENT (OUTPATIENT)
Dept: CARDIOLOGY | Facility: CLINIC | Age: 44
End: 2024-09-16
Payer: MEDICAID

## 2024-09-16 DIAGNOSIS — I48.0 PAROXYSMAL ATRIAL FIBRILLATION (MULTI): Primary | ICD-10-CM

## 2024-09-16 NOTE — TELEPHONE ENCOUNTER
Pt unable to attend appt 9/16, she took daughter to appt and was instructed to take her to [facility] ED.  Appt was r/s for 9/18.

## 2024-09-18 ENCOUNTER — ANTICOAGULATION - WARFARIN VISIT (OUTPATIENT)
Dept: CARDIOLOGY | Facility: CLINIC | Age: 44
End: 2024-09-18
Payer: MEDICAID

## 2024-09-18 DIAGNOSIS — I48.0 PAROXYSMAL ATRIAL FIBRILLATION (MULTI): Primary | ICD-10-CM

## 2024-09-18 LAB
POC INR: 2.4
POC PROTHROMBIN TIME: NORMAL

## 2024-09-18 PROCEDURE — 99211 OFF/OP EST MAY X REQ PHY/QHP: CPT

## 2024-09-18 PROCEDURE — 85610 PROTHROMBIN TIME: CPT | Mod: QW

## 2024-09-18 NOTE — PROGRESS NOTES
Patient identification verified with 2 identifiers.    Location: [facility] - [address] [address] [phone] option #1     Referring Physician: DR. BRIDGETTE MCDANIEL  Enrollment/ Re-enrollment date: 2025   INR Goal: 2.0-3.0  INR monitoring is per [facility] protocol.  Anticoagulation Medication: warfarin  Indication: Atrial Fibrillation/Atrial Flutter    Subjective   Bleeding signs/symptoms: No    Bruising: No   Major bleeding event: No  Thrombosis signs/symptoms: No  Thromboembolic event: No  Missed doses: No  Extra doses: No  Medication changes: No  Dietary changes: No  Change in health: No  Change in activity: No  Alcohol: No  Other concerns: No    Upcoming Procedures:  Does the Patient Have any upcoming procedures that require interruption in anticoagulation therapy? no  Does the patient require bridging? no      Anticoagulation Summary  As of 2024      INR goal:  2.0-3.0   TTR:  50.8% (11.7 mo)   INR used for dosin.40 (2024)   Weekly warfarin total:  32.5 mg               Assessment/Plan   Therapeutic   Maintain TWD  Return to clinic in 2 weeks          Education provided to patient during the visit:  Patient instructed to call in interim with questions, concerns and changes.   Patient educated on compliance with dosing, follow up appointments, and prescribed plan of care.

## 2024-10-05 ENCOUNTER — APPOINTMENT (OUTPATIENT)
Dept: CARDIOLOGY | Facility: CLINIC | Age: 44
End: 2024-10-05
Payer: MEDICAID

## 2024-10-05 ENCOUNTER — ANTICOAGULATION - WARFARIN VISIT (OUTPATIENT)
Dept: CARDIOLOGY | Facility: CLINIC | Age: 44
End: 2024-10-05
Payer: MEDICAID

## 2024-10-05 DIAGNOSIS — I48.0 PAROXYSMAL ATRIAL FIBRILLATION (MULTI): Primary | ICD-10-CM

## 2024-10-05 NOTE — PROGRESS NOTES
Patient called cancelling her appointment for today.  New appointment made for 10/6/24 @ 5160 per patient's request.

## 2024-10-09 ENCOUNTER — ANTICOAGULATION - WARFARIN VISIT (OUTPATIENT)
Dept: CARDIOLOGY | Facility: CLINIC | Age: 44
End: 2024-10-09

## 2024-10-09 DIAGNOSIS — I48.0 PAROXYSMAL ATRIAL FIBRILLATION (MULTI): Primary | ICD-10-CM

## 2024-10-09 LAB
POC INR: 3
POC PROTHROMBIN TIME: NORMAL

## 2024-10-09 PROCEDURE — 99211 OFF/OP EST MAY X REQ PHY/QHP: CPT

## 2024-10-09 PROCEDURE — 85610 PROTHROMBIN TIME: CPT | Mod: QW

## 2024-10-09 NOTE — PROGRESS NOTES
Patient identification verified with 2 identifiers.    Location: [facility] - [address] [address] [phone] option #1     Referring Physician: BRIDGETTE MCDANIEL  Enrollment/ Re-enrollment date: 2/26/2025   INR Goal: 2.0-3.0  INR monitoring is per [facility] protocol.  Anticoagulation Medication: warfarin  Indication: Atrial Fibrillation/Atrial Flutter    Subjective   Bleeding signs/symptoms: No    Bruising: No   Major bleeding event: No  Thrombosis signs/symptoms: No  Thromboembolic event: No  Missed doses: No  Extra doses: No  Medication changes: No  Dietary changes: No  Change in health: No  Change in activity: No  Alcohol: No  Other concerns: No    Upcoming Procedures:  Does the Patient Have any upcoming procedures that require interruption in anticoagulation therapy? no  Does the patient require bridging? no      Anticoagulation Summary  As of 10/9/2024      INR goal:  2.0-3.0   TTR:  53.6% (1 y)   INR used for dosing:  3.00 (10/9/2024)   Weekly warfarin total:  32.5 mg               Assessment/Plan   Therapeutic     1. New dose: no change    2. Next INR: 2 weeks      Education provided to patient during the visit:  Patient instructed to call in interim with questions, concerns and changes.   Patient educated on interactions between medications and warfarin.   Patient educated on dietary consistency in vitamin k consumption.   Patient educated on affects of alcohol consumption while taking warfarin.   Patient educated on signs of bleeding/clotting.   Patient educated on compliance with dosing, follow up appointments, and prescribed plan of care.

## 2024-10-18 ENCOUNTER — TELEPHONE (OUTPATIENT)
Dept: CARDIOLOGY | Facility: CLINIC | Age: 44
End: 2024-10-18

## 2024-10-18 NOTE — TELEPHONE ENCOUNTER
PT LEFT VM CANCELLING HER APPT 10/19/24. SHE DOES NOT HAVE INSURANCE AT THIS TIME. SHE WILL CALL TO R/S WHEN INSURANCES IS RE-INSTATED. PLEASE CONTINUE TO FOLLOW UP WITH PT

## 2024-10-19 ENCOUNTER — APPOINTMENT (OUTPATIENT)
Dept: CARDIOLOGY | Facility: CLINIC | Age: 44
End: 2024-10-19

## 2024-10-22 ENCOUNTER — APPOINTMENT (OUTPATIENT)
Dept: CARDIOLOGY | Facility: HOSPITAL | Age: 44
End: 2024-10-22

## 2024-11-12 ENCOUNTER — ANTICOAGULATION - WARFARIN VISIT (OUTPATIENT)
Dept: CARDIOLOGY | Facility: CLINIC | Age: 44
End: 2024-11-12
Payer: MEDICAID

## 2024-11-12 DIAGNOSIS — I48.0 PAROXYSMAL ATRIAL FIBRILLATION (MULTI): Primary | ICD-10-CM

## 2024-11-16 ENCOUNTER — ANTICOAGULATION - WARFARIN VISIT (OUTPATIENT)
Dept: CARDIOLOGY | Facility: CLINIC | Age: 44
End: 2024-11-16
Payer: MEDICAID

## 2024-11-16 DIAGNOSIS — I48.0 PAROXYSMAL ATRIAL FIBRILLATION (MULTI): Primary | ICD-10-CM

## 2024-11-16 LAB
POC INR: 1.3
POC PROTHROMBIN TIME: NORMAL

## 2024-11-16 PROCEDURE — 85610 PROTHROMBIN TIME: CPT | Mod: QW

## 2024-11-16 PROCEDURE — 99211 OFF/OP EST MAY X REQ PHY/QHP: CPT

## 2024-11-16 NOTE — PROGRESS NOTES
Patient identification verified with 2 identifiers.    Location: [facility] at [facility] - [address] [address] [phone] option #1     Referring Physician: BRIDGETTE MCDANIEL  Enrollment/ Re-enrollment date: 2025   INR Goal: 2.0-3.0  INR monitoring is per [facility] protocol.  Anticoagulation Medication: warfarin  Indication: Atrial Fibrillation/Atrial Flutter    Subjective   Bleeding signs/symptoms: No    Bruising: No   Major bleeding event: No  Thrombosis signs/symptoms: No  Thromboembolic event: No  Missed doses: Yes  PT RAN OUT OF 5 MG TABLETS AND TOOK 3.75MG SIX DAYS/WEEK AND 7.5MG ONE DAY WHICH =ONLY 30MG SO TOOK LESS THAN PRESCRIBED DOSE WHICH WAS 32.5MG. PT HAS 5MG TABLETS AGAIN  Extra doses: No  Medication changes: No  Dietary changes: Yes  INCREASED CONSUMPTION OF GREEN VEGETABLES  Change in health: No  Change in activity: No  Alcohol: No  Other concerns: No    Upcoming Procedures:  Does the Patient Have any upcoming procedures that require interruption in anticoagulation therapy? no  Does the patient require bridging? no      Anticoagulation Summary  As of 2024      INR goal:  2.0-3.0   TTR:  54.1% (1.1 y)   INR used for dosin.30 (2024)   Weekly warfarin total:  37.5 mg               Assessment/Plan   SUB THERAPEUTIC  INCREASE TWD BY 15% RATHER THAN 20% PER PROTOCOL D/T PT  TOOK LOWER DOSE THAN ADVISED AT LAST VISIT AND DID HAVE INCREASED SERVING OF GREEN VEGETABLES LAST WEEK.  RTC IN 6 DAYS PER PT AVAILABILITY.    Education provided to patient during the visit:  Patient instructed to call in interim with questions, concerns and changes.   Patient educated on interactions between medications and warfarin.   Patient educated on dietary consistency in vitamin k consumption.   Patient educated on affects of alcohol consumption while taking warfarin.   Patient educated on signs of bleeding/clotting.   Patient educated on compliance with  dosing, follow up appointments, and prescribed plan of care.

## 2024-11-21 ENCOUNTER — APPOINTMENT (OUTPATIENT)
Dept: CARDIOLOGY | Facility: CLINIC | Age: 44
End: 2024-11-21
Payer: MEDICAID

## 2024-11-22 ENCOUNTER — ANTICOAGULATION - WARFARIN VISIT (OUTPATIENT)
Dept: CARDIOLOGY | Facility: CLINIC | Age: 44
End: 2024-11-22
Payer: MEDICAID

## 2024-11-22 DIAGNOSIS — I48.0 PAROXYSMAL ATRIAL FIBRILLATION (MULTI): Primary | ICD-10-CM

## 2024-11-22 LAB
POC INR: 2.5
POC PROTHROMBIN TIME: NORMAL

## 2024-11-22 PROCEDURE — 99211 OFF/OP EST MAY X REQ PHY/QHP: CPT

## 2024-11-22 PROCEDURE — 85610 PROTHROMBIN TIME: CPT | Mod: QW

## 2024-11-22 NOTE — PROGRESS NOTES
Patient identification verified with 2 identifiers.    Location: [facility] at [facility] - [address] [address] [phone] option #1     Referring Physician: BRIDGETTE MCDANIEL  Enrollment/ Re-enrollment date: 2025   INR Goal: 2.0-3.0  INR monitoring is per [facility] protocol.  Anticoagulation Medication: warfarin  Indication: Atrial Fibrillation/Atrial Flutter    Subjective   Bleeding signs/symptoms: No    Bruising: No   Major bleeding event: No  Thrombosis signs/symptoms: No  Thromboembolic event: No  Missed doses: No  Extra doses: No  Medication changes: No  Dietary changes: No  Change in health: No  Change in activity: No  Alcohol: No  Other concerns: No    Upcoming Procedures:  Does the Patient Have any upcoming procedures that require interruption in anticoagulation therapy? no  Does the patient require bridging? no      Anticoagulation Summary  As of 2024      INR goal:  2.0-3.0   TTR:  53.9% (1.1 y)   INR used for dosin.50 (2024)   Weekly warfarin total:  37.5 mg               Assessment/Plan   THERAPEUTIC  MAINTAIN  RTC IN 8 DAYS PER PT AVAILABILITY.    Education provided to patient during the visit:  Patient instructed to call in interim with questions, concerns and changes.   Patient educated on interactions between medications and warfarin.   Patient educated on dietary consistency in vitamin k consumption.   Patient educated on affects of alcohol consumption while taking warfarin.   Patient educated on signs of bleeding/clotting.   Patient educated on compliance with dosing, follow up appointments, and prescribed plan of care.        
no

## 2024-11-30 ENCOUNTER — ANTICOAGULATION - WARFARIN VISIT (OUTPATIENT)
Dept: CARDIOLOGY | Facility: CLINIC | Age: 44
End: 2024-11-30
Payer: MEDICAID

## 2024-11-30 DIAGNOSIS — I48.0 PAROXYSMAL ATRIAL FIBRILLATION (MULTI): Primary | ICD-10-CM

## 2024-11-30 LAB
POC INR: 2.4
POC PROTHROMBIN TIME: NORMAL

## 2024-11-30 PROCEDURE — 85610 PROTHROMBIN TIME: CPT | Mod: QW

## 2024-11-30 PROCEDURE — 99211 OFF/OP EST MAY X REQ PHY/QHP: CPT

## 2024-11-30 NOTE — PROGRESS NOTES
Patient identification verified with 2 identifiers.    Location: [facility] at [facility] [address] [address] [phone] option #1     Referring Physician: DR. BRIDGETTE MCDANIEL  Enrollment/ Re-enrollment date: 2025   INR Goal: 2.0-3.0  INR monitoring is per [facility] protocol.  Anticoagulation Medication: warfarin  Indication: Atrial Fibrillation/Atrial Flutter    Subjective   Bleeding signs/symptoms: No    Bruising: No   Major bleeding event: No  Thrombosis signs/symptoms: No  Thromboembolic event: No  Missed doses: No  Extra doses: No  Medication changes: No  Dietary changes: No  Change in health: No  Change in activity: No  Alcohol: No  Other concerns: No    Upcoming Procedures:  Does the Patient Have any upcoming procedures that require interruption in anticoagulation therapy? no  Does the patient require bridging? no      Anticoagulation Summary  As of 2024      INR goal:  2.0-3.0   TTR:  54.7% (1.2 y)   INR used for dosin.40 (2024)   Weekly warfarin total:  37.5 mg               Assessment/Plan   Therapeutic     1. New dose: no change    2. Next INR: 2 weeks      Education provided to patient during the visit:  Patient instructed to call in interim with questions, concerns and changes.   Patient educated on compliance with dosing, follow up appointments, and prescribed plan of care.

## 2024-12-14 ENCOUNTER — ANTICOAGULATION - WARFARIN VISIT (OUTPATIENT)
Dept: CARDIOLOGY | Facility: CLINIC | Age: 44
End: 2024-12-14
Payer: MEDICAID

## 2024-12-14 DIAGNOSIS — I48.0 PAROXYSMAL ATRIAL FIBRILLATION (MULTI): Primary | ICD-10-CM

## 2024-12-14 LAB
POC INR: 3.5
POC PROTHROMBIN TIME: NORMAL

## 2024-12-14 PROCEDURE — 85610 PROTHROMBIN TIME: CPT | Mod: QW

## 2024-12-14 PROCEDURE — 99211 OFF/OP EST MAY X REQ PHY/QHP: CPT

## 2024-12-14 NOTE — PROGRESS NOTES
Patient identification verified with 2 identifiers.    Location: [facility] at [facility] - [address] [address] [phone] option #1     Referring Physician: DR. BRIDGETTE MCDANIEL  Enrollment/ Re-enrollment date: 2/26/2025   INR Goal: 2.0-3.0  INR monitoring is per [facility] protocol.  Anticoagulation Medication: warfarin  Indication: Atrial Fibrillation/Atrial Flutter    Subjective   Bleeding signs/symptoms: No    Bruising: No   Major bleeding event: No  Thrombosis signs/symptoms: No  Thromboembolic event: No  Missed doses: No  Extra doses: No  Medication changes: No  Dietary changes: Yes  increased consumption of Vitamin C  Change in health: No  Change in activity: No  Alcohol: No  Other concerns: No    Upcoming Procedures:  Does the Patient Have any upcoming procedures that require interruption in anticoagulation therapy? no  Does the patient require bridging? no      Anticoagulation Summary  As of 12/14/2024      INR goal:  2.0-3.0   TTR:  54.7% (1.2 y)   INR used for dosing:  3.50 (12/14/2024)   Weekly warfarin total:  37.5 mg               Assessment/Plan   SUPRA THERAPEUTIC IN SETTING OF INCREASED CONSUMPTION OF VITAMIN C FOODS  WILL HOLD TODAY'S DOSE THEN MAINTAIN TWD  RTC IN 2 WEEKS D/T WORK SCHEDULE PT NEEDS A SATURDAY APPOINTMENT.      Education provided to patient during the visit:  Patient instructed to call in interim with questions, concerns and changes.   Patient educated on compliance with dosing, follow up appointments, and prescribed plan of care.

## 2024-12-28 ENCOUNTER — ANTICOAGULATION - WARFARIN VISIT (OUTPATIENT)
Dept: CARDIOLOGY | Facility: CLINIC | Age: 44
End: 2024-12-28
Payer: MEDICAID

## 2024-12-28 DIAGNOSIS — I48.0 PAROXYSMAL ATRIAL FIBRILLATION (MULTI): Primary | ICD-10-CM

## 2024-12-28 LAB
POC INR: 3.7
POC PROTHROMBIN TIME: NORMAL

## 2024-12-28 PROCEDURE — 85610 PROTHROMBIN TIME: CPT | Mod: QW

## 2024-12-28 PROCEDURE — 99211 OFF/OP EST MAY X REQ PHY/QHP: CPT

## 2024-12-28 NOTE — PROGRESS NOTES
Patient identification verified with 2 identifiers.    Location: [facility] - [address] [address] [phone] option #1     Referring Physician: DR. MCDANIEL  Enrollment/ Re-enrollment date: 2/26/25   INR Goal: 2.0-3.0  INR monitoring is per [facility] protocol.  Anticoagulation Medication: warfarin  Indication: Atrial Fibrillation/Atrial Flutter    Subjective   Bleeding signs/symptoms: No    Bruising: No   Major bleeding event: No  Thrombosis signs/symptoms: No  Thromboembolic event: No  Missed doses: No  Extra doses: No  Medication changes: Yes  PT HAS BEEN TAKING TYLENOL AND IBUPROFEN FOR THE LAST FEW DAYS DUE TO COLD SYMPTOMS.  Dietary changes: Yes  PT HAS BEEN SICK AND HASN'T BEEN EATING HER NORMAL AMOUNT OF GREEN VEGETABLES.  Change in health: No  Change in activity: No  Alcohol: No  Other concerns: No    Upcoming Procedures:  Does the Patient Have any upcoming procedures that require interruption in anticoagulation therapy? no  Does the patient require bridging? no      Anticoagulation Summary  As of 12/28/2024      INR goal:  2.0-3.0   TTR:  53.0% (1.2 y)   INR used for dosing:  3.70 (12/28/2024)   Weekly warfarin total:  37.5 mg               Assessment/Plan   Supratherapeutic     1. New dose:  PT WILL HOLD TODAY'S DOSE THEN MAINTAIN CURRENT DOSE.      2. Next INR: 1 week      Education provided to patient during the visit:  Patient instructed to call in interim with questions, concerns and changes.   Patient educated on interactions between medications and warfarin.   Patient educated on dietary consistency in vitamin k consumption.   Patient educated on signs of bleeding/clotting.

## 2025-01-03 ENCOUNTER — ANTICOAGULATION - WARFARIN VISIT (OUTPATIENT)
Dept: CARDIOLOGY | Facility: CLINIC | Age: 45
End: 2025-01-03
Payer: MEDICAID

## 2025-01-03 DIAGNOSIS — I48.0 PAROXYSMAL ATRIAL FIBRILLATION (MULTI): Primary | ICD-10-CM

## 2025-01-03 LAB
POC INR: 2.4
POC PROTHROMBIN TIME: NORMAL

## 2025-01-03 PROCEDURE — 85610 PROTHROMBIN TIME: CPT | Mod: QW

## 2025-01-03 PROCEDURE — 99211 OFF/OP EST MAY X REQ PHY/QHP: CPT

## 2025-01-11 ENCOUNTER — ANTICOAGULATION - WARFARIN VISIT (OUTPATIENT)
Dept: CARDIOLOGY | Facility: CLINIC | Age: 45
End: 2025-01-11
Payer: MEDICAID

## 2025-01-11 DIAGNOSIS — I48.0 PAROXYSMAL ATRIAL FIBRILLATION (MULTI): Primary | ICD-10-CM

## 2025-01-11 LAB
POC INR: 3.3
POC PROTHROMBIN TIME: NORMAL

## 2025-01-11 PROCEDURE — 85610 PROTHROMBIN TIME: CPT | Mod: QW

## 2025-01-11 PROCEDURE — 99211 OFF/OP EST MAY X REQ PHY/QHP: CPT

## 2025-01-11 NOTE — PROGRESS NOTES
Patient identification verified with 2 identifiers.    Location: [facility] at [facility] - [address] [address] [phone] option #1     Referring Physician: DR. MCDANIEL  Enrollment/ Re-enrollment date: 2/26/25   INR Goal: 2.0-3.0  INR monitoring is per [facility] protocol.  Anticoagulation Medication: warfarin  Indication: Atrial Fibrillation/Atrial Flutter    Subjective   Bleeding signs/symptoms: No    Bruising: No   Major bleeding event: No  Thrombosis signs/symptoms: No  Thromboembolic event: No  Missed doses: No  Extra doses: No  Medication changes: Yes  will start Trulicity tomorrow  Dietary changes: Yes  inconsitent consumption of greens  Change in health: No  Change in activity: No  Alcohol: No  Other concerns: No    Upcoming Procedures:  Does the Patient Have any upcoming procedures that require interruption in anticoagulation therapy? no  Does the patient require bridging? no      Anticoagulation Summary  As of 1/11/2025      INR goal:  2.0-3.0   TTR:  53.2% (1.3 y)   INR used for dosing:  3.30 (1/11/2025)   Weekly warfarin total:  35 mg               Assessment/Plan   Therapeutic  Maintain TWD  RTC in 2 weeks per pt availability     Education provided to patient during the visit:  Patient instructed to call in interim with questions, concerns and changes.   Patient educated on interactions between medications and warfarin.   Patient educated on dietary consistency in vitamin k consumption.   Patient educated on signs of bleeding/clotting.

## 2025-01-13 ENCOUNTER — APPOINTMENT (OUTPATIENT)
Dept: RADIOLOGY | Facility: HOSPITAL | Age: 45
End: 2025-01-13
Payer: MEDICAID

## 2025-01-13 ENCOUNTER — HOSPITAL ENCOUNTER (EMERGENCY)
Facility: HOSPITAL | Age: 45
Discharge: HOME | End: 2025-01-13
Payer: MEDICAID

## 2025-01-13 ENCOUNTER — APPOINTMENT (OUTPATIENT)
Dept: CARDIOLOGY | Facility: HOSPITAL | Age: 45
End: 2025-01-13
Payer: MEDICAID

## 2025-01-13 VITALS
DIASTOLIC BLOOD PRESSURE: 79 MMHG | BODY MASS INDEX: 31.58 KG/M2 | HEIGHT: 64 IN | OXYGEN SATURATION: 99 % | RESPIRATION RATE: 16 BRPM | TEMPERATURE: 96.8 F | WEIGHT: 185 LBS | SYSTOLIC BLOOD PRESSURE: 114 MMHG | HEART RATE: 77 BPM

## 2025-01-13 DIAGNOSIS — R07.9 ACUTE CHEST PAIN: Primary | ICD-10-CM

## 2025-01-13 LAB
ALBUMIN SERPL BCP-MCNC: 4.2 G/DL (ref 3.4–5)
ALP SERPL-CCNC: 55 U/L (ref 33–110)
ALT SERPL W P-5'-P-CCNC: 14 U/L (ref 7–45)
ANION GAP SERPL CALCULATED.3IONS-SCNC: 12 MMOL/L (ref 10–20)
APTT PPP: 33.5 SECONDS (ref 22–32.5)
AST SERPL W P-5'-P-CCNC: 14 U/L (ref 9–39)
BASOPHILS # BLD AUTO: 0.04 X10*3/UL (ref 0–0.1)
BASOPHILS NFR BLD AUTO: 1 %
BILIRUB SERPL-MCNC: 0.6 MG/DL (ref 0–1.2)
BNP SERPL-MCNC: 314 PG/ML (ref 0–99)
BUN SERPL-MCNC: 10 MG/DL (ref 6–23)
CALCIUM SERPL-MCNC: 9.6 MG/DL (ref 8.6–10.3)
CARDIAC TROPONIN I PNL SERPL HS: 12 NG/L (ref 0–13)
CARDIAC TROPONIN I PNL SERPL HS: 17 NG/L (ref 0–13)
CHLORIDE SERPL-SCNC: 99 MMOL/L (ref 98–107)
CO2 SERPL-SCNC: 30 MMOL/L (ref 21–32)
CREAT SERPL-MCNC: 0.76 MG/DL (ref 0.5–1.05)
EGFRCR SERPLBLD CKD-EPI 2021: >90 ML/MIN/1.73M*2
EOSINOPHIL # BLD AUTO: 0.23 X10*3/UL (ref 0–0.7)
EOSINOPHIL NFR BLD AUTO: 5.6 %
ERYTHROCYTE [DISTWIDTH] IN BLOOD BY AUTOMATED COUNT: 11.9 % (ref 11.5–14.5)
FLUAV RNA RESP QL NAA+PROBE: NOT DETECTED
FLUBV RNA RESP QL NAA+PROBE: NOT DETECTED
GLUCOSE SERPL-MCNC: 219 MG/DL (ref 74–99)
HCT VFR BLD AUTO: 46.4 % (ref 36–46)
HGB BLD-MCNC: 15.6 G/DL (ref 12–16)
IMM GRANULOCYTES # BLD AUTO: 0.01 X10*3/UL (ref 0–0.7)
IMM GRANULOCYTES NFR BLD AUTO: 0.2 % (ref 0–0.9)
INR PPP: 1.9 (ref 0.9–1.2)
LYMPHOCYTES # BLD AUTO: 1.57 X10*3/UL (ref 1.2–4.8)
LYMPHOCYTES NFR BLD AUTO: 38.4 %
MAGNESIUM SERPL-MCNC: 1.6 MG/DL (ref 1.6–2.4)
MCH RBC QN AUTO: 30.2 PG (ref 26–34)
MCHC RBC AUTO-ENTMCNC: 33.6 G/DL (ref 32–36)
MCV RBC AUTO: 90 FL (ref 80–100)
MONOCYTES # BLD AUTO: 0.37 X10*3/UL (ref 0.1–1)
MONOCYTES NFR BLD AUTO: 9 %
NEUTROPHILS # BLD AUTO: 1.87 X10*3/UL (ref 1.2–7.7)
NEUTROPHILS NFR BLD AUTO: 45.8 %
NRBC BLD-RTO: 0 /100 WBCS (ref 0–0)
PLATELET # BLD AUTO: 257 X10*3/UL (ref 150–450)
POTASSIUM SERPL-SCNC: 3.9 MMOL/L (ref 3.5–5.3)
PROT SERPL-MCNC: 8.4 G/DL (ref 6.4–8.2)
PROTHROMBIN TIME: 18.8 SECONDS (ref 9.3–12.7)
RBC # BLD AUTO: 5.17 X10*6/UL (ref 4–5.2)
SARS-COV-2 RNA RESP QL NAA+PROBE: NOT DETECTED
SODIUM SERPL-SCNC: 137 MMOL/L (ref 136–145)
WBC # BLD AUTO: 4.1 X10*3/UL (ref 4.4–11.3)

## 2025-01-13 PROCEDURE — 87636 SARSCOV2 & INF A&B AMP PRB: CPT

## 2025-01-13 PROCEDURE — 83880 ASSAY OF NATRIURETIC PEPTIDE: CPT

## 2025-01-13 PROCEDURE — 85025 COMPLETE CBC W/AUTO DIFF WBC: CPT

## 2025-01-13 PROCEDURE — 93005 ELECTROCARDIOGRAM TRACING: CPT

## 2025-01-13 PROCEDURE — 84484 ASSAY OF TROPONIN QUANT: CPT

## 2025-01-13 PROCEDURE — 71046 X-RAY EXAM CHEST 2 VIEWS: CPT

## 2025-01-13 PROCEDURE — 36415 COLL VENOUS BLD VENIPUNCTURE: CPT

## 2025-01-13 PROCEDURE — 71046 X-RAY EXAM CHEST 2 VIEWS: CPT | Performed by: RADIOLOGY

## 2025-01-13 PROCEDURE — 80053 COMPREHEN METABOLIC PANEL: CPT

## 2025-01-13 PROCEDURE — 85730 THROMBOPLASTIN TIME PARTIAL: CPT

## 2025-01-13 PROCEDURE — 83735 ASSAY OF MAGNESIUM: CPT

## 2025-01-13 PROCEDURE — 85610 PROTHROMBIN TIME: CPT

## 2025-01-13 PROCEDURE — 99285 EMERGENCY DEPT VISIT HI MDM: CPT | Mod: 25

## 2025-01-13 ASSESSMENT — PAIN DESCRIPTION - DESCRIPTORS: DESCRIPTORS: PRESSURE;STABBING

## 2025-01-13 ASSESSMENT — PAIN DESCRIPTION - PAIN TYPE: TYPE: ACUTE PAIN

## 2025-01-13 ASSESSMENT — PAIN - FUNCTIONAL ASSESSMENT
PAIN_FUNCTIONAL_ASSESSMENT: 0-10
PAIN_FUNCTIONAL_ASSESSMENT: 0-10

## 2025-01-13 ASSESSMENT — COLUMBIA-SUICIDE SEVERITY RATING SCALE - C-SSRS
2. HAVE YOU ACTUALLY HAD ANY THOUGHTS OF KILLING YOURSELF?: NO
1. IN THE PAST MONTH, HAVE YOU WISHED YOU WERE DEAD OR WISHED YOU COULD GO TO SLEEP AND NOT WAKE UP?: NO
6. HAVE YOU EVER DONE ANYTHING, STARTED TO DO ANYTHING, OR PREPARED TO DO ANYTHING TO END YOUR LIFE?: NO

## 2025-01-13 ASSESSMENT — HEART SCORE
ECG: NORMAL
HISTORY: MODERATELY SUSPICIOUS
TROPONIN: 1-3 TIMES NORMAL LIMIT
AGE: <45
HEART SCORE: 3
RISK FACTORS: 1-2 RISK FACTORS

## 2025-01-13 ASSESSMENT — PAIN SCALES - GENERAL
PAINLEVEL_OUTOF10: 0 - NO PAIN
PAINLEVEL_OUTOF10: 3

## 2025-01-13 ASSESSMENT — PAIN DESCRIPTION - LOCATION: LOCATION: CHEST

## 2025-01-13 NOTE — ED TRIAGE NOTES
CP woke up pt this am. 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 Pt has a Pacemaker with Defib, with EF 25%. Pain 3/10

## 2025-01-13 NOTE — DISCHARGE INSTRUCTIONS
You are seen today for chest pain, likely discussed, please follow-up with your primary care provider, as well as your cardiologist.  Please return to the ER for any worsening symptoms.

## 2025-01-13 NOTE — ED PROVIDER NOTES
HPI   Chief Complaint   Patient presents with    Chest Pain       Patient is a 44-year-old female presents today with a chief complaint of chest pain.  Patient has a prior history of a  maker lesion, as well as a second MI, patient has a pacemaker.  PPM, history of cardiomyopathy, patient's last EF was 25%, patient awoke with chest pain this morning into her arm, patient had no shortness of breath, no nausea or vomiting or abdominal pain, no dizziness or blurred vision, no fevers or chills, no other acute complaints at this time.      History provided by:  Patient          Patient History   Past Medical History:   Diagnosis Date    Myocardial infarction (Multi)     x 2 with significant apical involvement    Personal history of nicotine dependence 06/10/2019    Former cigarette smoker    Personal history of other diseases of the circulatory system 2014    History of atrial fibrillation    Personal history of other diseases of the respiratory system 2022    History of acute bronchitis    Systemic lupus erythematosus, unspecified (Multi) 2013    Systemic lupus erythematosus     Past Surgical History:   Procedure Laterality Date    CERVICAL BIOPSY  W/ LOOP ELECTRODE EXCISION      Cervical Loop Electrosurgical Excision (LEEP)     SECTION, LOW TRANSVERSE      HYSTERECTOMY  2014    peripartum, supracervical, at [facility]    MR HEAD ANGIO WO IV CONTRAST  2013    MR HEAD ANGIO WO IV CONTRAST 2013 [facility]    MR NECK ANGIO WO IV CONTRAST  2013    MR NECK ANGIO WO IV CONTRAST 2013 [facility]    OTHER SURGICAL HISTORY  2019    Cardioverter defibrillator insertion    TOTAL HIP ARTHROPLASTY Bilateral 2014    Hip Replacement     No family history on file.  Social History     Tobacco Use    Smoking status: Former     Current packs/day: 0.00     Average packs/day: 1 pack/day for 25.0 years (25.0 ttl pk-yrs)     Types: Cigarettes     Start  date:      Quit date: 2016     Years since quittin.0    Smokeless tobacco: Never   Vaping Use    Vaping status: Never Used   Substance Use Topics    Alcohol use: Yes     Alcohol/week: 3.0 standard drinks of alcohol     Types: 3 Glasses of wine per week    Drug use: Not Currently       Physical Exam   ED Triage Vitals [25 1007]   Temperature Heart Rate Respirations BP   36 °C (96.8 °F) 84 16 111/82      Pulse Ox Temp Source Heart Rate Source Patient Position   95 % Oral Monitor --      BP Location FiO2 (%)     -- --       Physical Exam  Vitals and nursing note reviewed.   Constitutional:       General: She is not in acute distress.     Appearance: She is well-developed and normal weight. She is not ill-appearing, toxic-appearing or diaphoretic.   HENT:      Head: Normocephalic and atraumatic.   Eyes:      Extraocular Movements: Extraocular movements intact.      Pupils: Pupils are equal, round, and reactive to light.   Cardiovascular:      Rate and Rhythm: Normal rate and regular rhythm.      Heart sounds: Normal heart sounds. No murmur heard.     No friction rub. No gallop.   Pulmonary:      Effort: Pulmonary effort is normal.      Breath sounds: Normal breath sounds.   Chest:      Chest wall: No tenderness.   Abdominal:      General: Bowel sounds are normal.      Palpations: Abdomen is soft.   Musculoskeletal:         General: Normal range of motion.   Skin:     General: Skin is warm and dry.      Capillary Refill: Capillary refill takes less than 2 seconds.   Neurological:      General: No focal deficit present.      Mental Status: She is alert and oriented to person, place, and time.   Psychiatric:         Mood and Affect: Mood normal.         Behavior: Behavior normal.           ED Course & MDM   ED Course as of 25   0955 Prehospital EKG interpreted by myself independently, EKG shows a sinus rhythm, rate of 91 bpm, ID interval 152, , , QTc 444, patient  does have a left anterior fascicular block, no ST elevation or depression, negative for acute MI. [MARIAA]   1013 EKG interpreted by myself independently, EKG shows normal sinus rhythm, rate 85 bpm, parable 156, , QT 4 2, QTc 478, patient has no ST elevation or depressions, negative for acute MI. [MARIAA]      ED Course User Index  [MARIAA] Jae Leiva,          Diagnoses as of 01/13/25 2208   Acute chest pain                 No data recorded     Chuck Coma Scale Score: 15 (01/13/25 1011 : Kymberly Quezada, RN)                           Medical Decision Making  Patient seen and evaluated at bedside, patient is in no acute distress.  I will order a CBC, CMP, troponin, EKG, BNP, COVID, flu, coags, chest x-ray. Differential diagnosis includes but is not limited to ACS, pneumonia, COVID, flu, musculoskeletal chest pain, costochondritis.  Patient's BNP slightly elevated at 314, initial troponin 17, repeat troponin 12, COVID and flu negative, glucose 219, CBC shows a white blood cell of 4.1, hemoglobin 15.6, medic at 46.4.  Patient's chest x-ray did not show any acute findings, shared decision making was used with the patient, patient elected to discharge home, will follow-up with cardiology, return precautions were discussed with the patient, she is agreeable this discharge plan.    Diagnosis: Chest pain  XR chest 2 views   Final Result    1.  No evidence of acute cardiopulmonary process. Stable chest                      MACRO:    None          Signed by: Joseph Schoenberger 1/13/2025 11:21 AM    Dictation workstation:   [id number]     Results for orders placed or performed during the hospital encounter of 01/13/25  -CBC and Auto Differential:   Collection Time: 01/13/25 10:26 AM       Result                      Value             Ref Range           WBC                         4.1 (L)           4.4 - 11.3 x*       nRBC                        0.0               0.0 - 0.0 /1*       RBC                         5.17               4.00 - 5.20 *       Hemoglobin                  15.6              12.0 - 16.0 *       Hematocrit                  46.4 (H)          36.0 - 46.0 %       MCV                         90                80 - 100 fL         MCH                         30.2              26.0 - 34.0 *       MCHC                        33.6              32.0 - 36.0 *       RDW                         11.9              11.5 - 14.5 %       Platelets                   257               150 - 450 x1*       Neutrophils %               45.8              40.0 - 80.0 %       Immature Granulocytes *     0.2               0.0 - 0.9 %         Lymphocytes %               38.4              13.0 - 44.0 %       Monocytes %                 9.0               2.0 - 10.0 %        Eosinophils %               5.6               0.0 - 6.0 %         Basophils %                 1.0               0.0 - 2.0 %         Neutrophils Absolute        1.87              1.20 - 7.70 *       Immature Granulocytes *     0.01              0.00 - 0.70 *       Lymphocytes Absolute        1.57              1.20 - 4.80 *       Monocytes Absolute          0.37              0.10 - 1.00 *       Eosinophils Absolute        0.23              0.00 - 0.70 *       Basophils Absolute          0.04              0.00 - 0.10 *  -Magnesium:   Collection Time: 01/13/25 10:26 AM       Result                      Value             Ref Range           Magnesium                   1.60              1.60 - 2.40 *  -Comprehensive metabolic panel:   Collection Time: 01/13/25 10:26 AM       Result                      Value             Ref Range           Glucose                     219 (H)           74 - 99 mg/dL       Sodium                      137               136 - 145 mm*       Potassium                   3.9               3.5 - 5.3 mm*       Chloride                    99                98 - 107 mmo*       Bicarbonate                 30                21 - 32 mmol*       Anion Gap                    12                10 - 20 mmol*       Urea Nitrogen               10                6 - 23 mg/dL        Creatinine                  0.76              0.50 - 1.05 *       eGFR                        >90               >60 mL/min/1*       Calcium                     9.6               8.6 - 10.3 m*       Albumin                     4.2               3.4 - 5.0 g/*       Alkaline Phosphatase        55                33 - 110 U/L        Total Protein               8.4 (H)           6.4 - 8.2 g/*       AST                         14                9 - 39 U/L          Bilirubin, Total            0.6               0.0 - 1.2 mg*       ALT                         14                7 - 45 U/L     -B-Type Natriuretic Peptide:   Collection Time: 01/13/25 10:26 AM       Result                      Value             Ref Range           BNP                         314 (H)           0 - 99 pg/mL   -Protime-INR:   Collection Time: 01/13/25 10:26 AM       Result                      Value             Ref Range           Protime                     18.8 (H)          9.3 - 12.7 s*       INR                         1.9 (H)           0.9 - 1.2      -APTT:   Collection Time: 01/13/25 10:26 AM       Result                      Value             Ref Range           aPTT                        33.5 (H)          22.0 - 32.5 *  -Troponin I, High Sensitivity, Initial:   Collection Time: 01/13/25 10:26 AM       Result                      Value             Ref Range           Troponin I, High Sensi*     17 (H)            0 - 13 ng/L    -Sars-CoV-2 and Influenza A/B PCR:   Collection Time: 01/13/25 10:28 AM       Result                      Value             Ref Range           Flu A Result                Not Detected      Not Detected        Flu B Result                Not Detected      Not Detected        Coronavirus 2019, PCR       Not Detected      Not Detected   -Troponin, High Sensitivity, 1 Hour:   Collection Time: 01/13/25 11:28 AM        Result                      Value             Ref Range           Troponin I, High Sensi*     12                0 - 13 ng/L      *Note: Due to a large number of results and/or encounters for the requested time period, some results have not been displayed. A complete set of results can be found in Results Review.  .        Procedure  Procedures  Sections of this report were created using voice-to-text technology and may contain errors in translation    Jae Leiva DO  Emergency Medicine         Jae Leiva DO  01/13/25 7132

## 2025-01-13 NOTE — Clinical Note
Jayde WorrellDevika was seen and treated in our emergency department on 1/13/2025.  She may return to work on 01/14/2025.       If you have any questions or concerns, please don't hesitate to call.      Jae Leiva, DO

## 2025-01-17 LAB
ATRIAL RATE: 85 BPM
P AXIS: 38 DEGREES
P OFFSET: 195 MS
P ONSET: 138 MS
PR INTERVAL: 156 MS
Q ONSET: 216 MS
QRS COUNT: 14 BEATS
QRS DURATION: 106 MS
QT INTERVAL: 402 MS
QTC CALCULATION(BAZETT): 478 MS
QTC FREDERICIA: 451 MS
R AXIS: -49 DEGREES
T AXIS: 22 DEGREES
T OFFSET: 417 MS
VENTRICULAR RATE: 85 BPM

## 2025-01-25 ENCOUNTER — TELEPHONE (OUTPATIENT)
Dept: CARDIOLOGY | Facility: CLINIC | Age: 45
End: 2025-01-25

## 2025-01-31 ENCOUNTER — APPOINTMENT (OUTPATIENT)
Dept: RADIOLOGY | Facility: HOSPITAL | Age: 45
End: 2025-01-31
Payer: MEDICAID

## 2025-01-31 ENCOUNTER — HOSPITAL ENCOUNTER (INPATIENT)
Facility: HOSPITAL | Age: 45
LOS: 1 days | Discharge: OTHER INSTITUTION | End: 2025-01-31
Attending: EMERGENCY MEDICINE | Admitting: INTERNAL MEDICINE
Payer: MEDICAID

## 2025-01-31 ENCOUNTER — HOSPITAL ENCOUNTER (INPATIENT)
Facility: HOSPITAL | Age: 45
LOS: 1 days | Discharge: HOME | End: 2025-02-01
Attending: INTERNAL MEDICINE | Admitting: INTERNAL MEDICINE
Payer: MEDICAID

## 2025-01-31 ENCOUNTER — APPOINTMENT (OUTPATIENT)
Dept: CARDIOLOGY | Facility: HOSPITAL | Age: 45
End: 2025-01-31
Payer: MEDICAID

## 2025-01-31 VITALS
SYSTOLIC BLOOD PRESSURE: 105 MMHG | BODY MASS INDEX: 32.78 KG/M2 | OXYGEN SATURATION: 96 % | HEART RATE: 79 BPM | DIASTOLIC BLOOD PRESSURE: 78 MMHG | TEMPERATURE: 98.4 F | HEIGHT: 64 IN | WEIGHT: 192 LBS | RESPIRATION RATE: 16 BRPM

## 2025-01-31 DIAGNOSIS — I63.512 CEREBRAL INFARCTION DUE TO UNSPECIFIED OCCLUSION OR STENOSIS OF LEFT MIDDLE CEREBRAL ARTERY (MULTI): ICD-10-CM

## 2025-01-31 DIAGNOSIS — I25.2 HISTORY OF ST ELEVATION MYOCARDIAL INFARCTION (STEMI): ICD-10-CM

## 2025-01-31 DIAGNOSIS — R29.898 RIGHT ARM WEAKNESS: ICD-10-CM

## 2025-01-31 DIAGNOSIS — R47.01 EXPRESSIVE APHASIA: Primary | ICD-10-CM

## 2025-01-31 DIAGNOSIS — I51.3 LEFT VENTRICULAR THROMBUS: ICD-10-CM

## 2025-01-31 DIAGNOSIS — I42.9 CARDIOMYOPATHY, UNSPECIFIED TYPE (MULTI): Chronic | ICD-10-CM

## 2025-01-31 DIAGNOSIS — F41.9 ANXIETY: ICD-10-CM

## 2025-01-31 DIAGNOSIS — I63.511 ACUTE ISCHEMIC RIGHT MCA STROKE (MULTI): Primary | ICD-10-CM

## 2025-01-31 DIAGNOSIS — Z95.810 CARDIAC DEFIBRILLATOR IN PLACE: ICD-10-CM

## 2025-01-31 DIAGNOSIS — I10 DIASTOLIC HYPERTENSION: ICD-10-CM

## 2025-01-31 DIAGNOSIS — R20.2 PARESTHESIA: ICD-10-CM

## 2025-01-31 DIAGNOSIS — I48.0 PAROXYSMAL ATRIAL FIBRILLATION (MULTI): ICD-10-CM

## 2025-01-31 LAB
ALBUMIN SERPL BCP-MCNC: 3.9 G/DL (ref 3.4–5)
ALP SERPL-CCNC: 44 U/L (ref 33–110)
ALT SERPL W P-5'-P-CCNC: 11 U/L (ref 7–45)
ANION GAP SERPL CALCULATED.3IONS-SCNC: 12 MMOL/L (ref 10–20)
APPEARANCE UR: CLEAR
APTT PPP: 24.3 SECONDS (ref 22–32.5)
AST SERPL W P-5'-P-CCNC: 19 U/L (ref 9–39)
ATRIAL RATE: 86 BPM
B-HCG SERPL-ACNC: <2 MIU/ML
BASOPHILS # BLD AUTO: 0.03 X10*3/UL (ref 0–0.1)
BASOPHILS NFR BLD AUTO: 1.3 %
BILIRUB SERPL-MCNC: 0.4 MG/DL (ref 0–1.2)
BILIRUB UR STRIP.AUTO-MCNC: NEGATIVE MG/DL
BNP SERPL-MCNC: 211 PG/ML (ref 0–99)
BUN SERPL-MCNC: 8 MG/DL (ref 6–23)
CALCIUM SERPL-MCNC: 8.6 MG/DL (ref 8.6–10.3)
CARDIAC TROPONIN I PNL SERPL HS: 7 NG/L (ref 0–13)
CHLORIDE SERPL-SCNC: 102 MMOL/L (ref 98–107)
CHOLEST SERPL-MCNC: 231 MG/DL (ref 0–199)
CHOLESTEROL/HDL RATIO: 5.1
CO2 SERPL-SCNC: 22 MMOL/L (ref 21–32)
COLOR UR: ABNORMAL
CREAT SERPL-MCNC: 0.59 MG/DL (ref 0.5–1.05)
DACRYOCYTES BLD QL SMEAR: NORMAL
EGFRCR SERPLBLD CKD-EPI 2021: >90 ML/MIN/1.73M*2
EOSINOPHIL # BLD AUTO: 0.16 X10*3/UL (ref 0–0.7)
EOSINOPHIL NFR BLD AUTO: 6.8 %
ERYTHROCYTE [DISTWIDTH] IN BLOOD BY AUTOMATED COUNT: 12.1 % (ref 11.5–14.5)
GLUCOSE BLD MANUAL STRIP-MCNC: 131 MG/DL (ref 74–99)
GLUCOSE SERPL-MCNC: 125 MG/DL (ref 74–99)
GLUCOSE UR STRIP.AUTO-MCNC: ABNORMAL MG/DL
HCT VFR BLD AUTO: 41.6 % (ref 36–46)
HDLC SERPL-MCNC: 45.5 MG/DL
HGB BLD-MCNC: 14.2 G/DL (ref 12–16)
IMM GRANULOCYTES # BLD AUTO: 0 X10*3/UL (ref 0–0.7)
IMM GRANULOCYTES NFR BLD AUTO: 0 % (ref 0–0.9)
INR PPP: 1.6 (ref 0.9–1.2)
KETONES UR STRIP.AUTO-MCNC: NEGATIVE MG/DL
LDLC SERPL CALC-MCNC: 148 MG/DL
LEUKOCYTE ESTERASE UR QL STRIP.AUTO: NEGATIVE
LYMPHOCYTES # BLD AUTO: 1.41 X10*3/UL (ref 1.2–4.8)
LYMPHOCYTES NFR BLD AUTO: 59.7 %
MCH RBC QN AUTO: 30.8 PG (ref 26–34)
MCHC RBC AUTO-ENTMCNC: 34.1 G/DL (ref 32–36)
MCV RBC AUTO: 90 FL (ref 80–100)
MONOCYTES # BLD AUTO: 0.37 X10*3/UL (ref 0.1–1)
MONOCYTES NFR BLD AUTO: 15.7 %
NEUTROPHILS # BLD AUTO: 0.39 X10*3/UL (ref 1.2–7.7)
NEUTROPHILS NFR BLD AUTO: 16.5 %
NITRITE UR QL STRIP.AUTO: NEGATIVE
NON HDL CHOLESTEROL: 186 MG/DL (ref 0–149)
NRBC BLD-RTO: 0 /100 WBCS (ref 0–0)
P AXIS: 51 DEGREES
P OFFSET: 193 MS
P ONSET: 137 MS
PH UR STRIP.AUTO: 5.5 [PH]
PLATELET # BLD AUTO: 163 X10*3/UL (ref 150–450)
POTASSIUM SERPL-SCNC: 4.3 MMOL/L (ref 3.5–5.3)
PR INTERVAL: 156 MS
PROT SERPL-MCNC: 7.5 G/DL (ref 6.4–8.2)
PROT UR STRIP.AUTO-MCNC: NEGATIVE MG/DL
PROTHROMBIN TIME: 16.7 SECONDS (ref 9.3–12.7)
Q ONSET: 215 MS
QRS COUNT: 14 BEATS
QRS DURATION: 104 MS
QT INTERVAL: 398 MS
QTC CALCULATION(BAZETT): 476 MS
QTC FREDERICIA: 449 MS
R AXIS: -68 DEGREES
RBC # BLD AUTO: 4.61 X10*6/UL (ref 4–5.2)
RBC # UR STRIP.AUTO: NEGATIVE /UL
RBC MORPH BLD: NORMAL
SODIUM SERPL-SCNC: 132 MMOL/L (ref 136–145)
SP GR UR STRIP.AUTO: >1.05
T AXIS: 63 DEGREES
T OFFSET: 414 MS
TRIGL SERPL-MCNC: 190 MG/DL (ref 0–149)
UROBILINOGEN UR STRIP.AUTO-MCNC: NORMAL MG/DL
VENTRICULAR RATE: 86 BPM
VLDL: 38 MG/DL (ref 0–40)
WBC # BLD AUTO: 2.4 X10*3/UL (ref 4.4–11.3)

## 2025-01-31 PROCEDURE — 70496 CT ANGIOGRAPHY HEAD: CPT | Performed by: RADIOLOGY

## 2025-01-31 PROCEDURE — 70450 CT HEAD/BRAIN W/O DYE: CPT | Performed by: RADIOLOGY

## 2025-01-31 PROCEDURE — 83880 ASSAY OF NATRIURETIC PEPTIDE: CPT

## 2025-01-31 PROCEDURE — 86038 ANTINUCLEAR ANTIBODIES: CPT | Mod: STJLAB

## 2025-01-31 PROCEDURE — 83516 IMMUNOASSAY NONANTIBODY: CPT

## 2025-01-31 PROCEDURE — 93005 ELECTROCARDIOGRAM TRACING: CPT

## 2025-01-31 PROCEDURE — 2550000001 HC RX 255 CONTRASTS: Performed by: EMERGENCY MEDICINE

## 2025-01-31 PROCEDURE — 99291 CRITICAL CARE FIRST HOUR: CPT | Mod: 25

## 2025-01-31 PROCEDURE — 70498 CT ANGIOGRAPHY NECK: CPT

## 2025-01-31 PROCEDURE — 83735 ASSAY OF MAGNESIUM: CPT

## 2025-01-31 PROCEDURE — 70498 CT ANGIOGRAPHY NECK: CPT | Performed by: RADIOLOGY

## 2025-01-31 PROCEDURE — 36415 COLL VENOUS BLD VENIPUNCTURE: CPT

## 2025-01-31 PROCEDURE — 80053 COMPREHEN METABOLIC PANEL: CPT

## 2025-01-31 PROCEDURE — 81003 URINALYSIS AUTO W/O SCOPE: CPT | Performed by: EMERGENCY MEDICINE

## 2025-01-31 PROCEDURE — 84702 CHORIONIC GONADOTROPIN TEST: CPT | Performed by: EMERGENCY MEDICINE

## 2025-01-31 PROCEDURE — 85730 THROMBOPLASTIN TIME PARTIAL: CPT

## 2025-01-31 PROCEDURE — 99291 CRITICAL CARE FIRST HOUR: CPT | Mod: 25 | Performed by: EMERGENCY MEDICINE

## 2025-01-31 PROCEDURE — 83036 HEMOGLOBIN GLYCOSYLATED A1C: CPT | Mod: STJLAB

## 2025-01-31 PROCEDURE — 2500000002 HC RX 250 W HCPCS SELF ADMINISTERED DRUGS (ALT 637 FOR MEDICARE OP, ALT 636 FOR OP/ED)

## 2025-01-31 PROCEDURE — 83718 ASSAY OF LIPOPROTEIN: CPT

## 2025-01-31 PROCEDURE — 86140 C-REACTIVE PROTEIN: CPT

## 2025-01-31 PROCEDURE — 85045 AUTOMATED RETICULOCYTE COUNT: CPT

## 2025-01-31 PROCEDURE — 85025 COMPLETE CBC W/AUTO DIFF WBC: CPT

## 2025-01-31 PROCEDURE — 71045 X-RAY EXAM CHEST 1 VIEW: CPT

## 2025-01-31 PROCEDURE — 2020000001 HC ICU ROOM DAILY

## 2025-01-31 PROCEDURE — 86225 DNA ANTIBODY NATIVE: CPT | Mod: STJLAB

## 2025-01-31 PROCEDURE — 85652 RBC SED RATE AUTOMATED: CPT

## 2025-01-31 PROCEDURE — 86160 COMPLEMENT ANTIGEN: CPT | Mod: STJLAB

## 2025-01-31 PROCEDURE — 99291 CRITICAL CARE FIRST HOUR: CPT

## 2025-01-31 PROCEDURE — 70450 CT HEAD/BRAIN W/O DYE: CPT

## 2025-01-31 PROCEDURE — 84484 ASSAY OF TROPONIN QUANT: CPT

## 2025-01-31 PROCEDURE — 82947 ASSAY GLUCOSE BLOOD QUANT: CPT

## 2025-01-31 PROCEDURE — 85610 PROTHROMBIN TIME: CPT

## 2025-01-31 PROCEDURE — 71045 X-RAY EXAM CHEST 1 VIEW: CPT | Performed by: RADIOLOGY

## 2025-01-31 PROCEDURE — 86235 NUCLEAR ANTIGEN ANTIBODY: CPT

## 2025-01-31 RX ORDER — POLYETHYLENE GLYCOL 3350 17 G/17G
17 POWDER, FOR SOLUTION ORAL DAILY PRN
Status: DISCONTINUED | OUTPATIENT
Start: 2025-01-31 | End: 2025-02-01 | Stop reason: HOSPADM

## 2025-01-31 RX ORDER — DESIPRAMINE HYDROCHLORIDE 10 MG/1
10 TABLET ORAL DAILY
Status: DISCONTINUED | OUTPATIENT
Start: 2025-02-01 | End: 2025-02-01

## 2025-01-31 RX ORDER — METOPROLOL SUCCINATE 100 MG/1
200 TABLET, EXTENDED RELEASE ORAL DAILY
Status: DISCONTINUED | OUTPATIENT
Start: 2025-02-01 | End: 2025-02-01 | Stop reason: HOSPADM

## 2025-01-31 RX ORDER — SERTRALINE HYDROCHLORIDE 100 MG/1
100 TABLET, FILM COATED ORAL DAILY
Status: DISCONTINUED | OUTPATIENT
Start: 2025-02-01 | End: 2025-02-01 | Stop reason: HOSPADM

## 2025-01-31 RX ORDER — DEXTROSE 50 % IN WATER (D50W) INTRAVENOUS SYRINGE
12.5
Status: DISCONTINUED | OUTPATIENT
Start: 2025-01-31 | End: 2025-02-01 | Stop reason: HOSPADM

## 2025-01-31 RX ORDER — CLOPIDOGREL BISULFATE 75 MG/1
75 TABLET ORAL DAILY
Status: DISCONTINUED | OUTPATIENT
Start: 2025-02-01 | End: 2025-02-01 | Stop reason: HOSPADM

## 2025-01-31 RX ORDER — HYDRALAZINE HYDROCHLORIDE 20 MG/ML
10 INJECTION INTRAMUSCULAR; INTRAVENOUS
Status: DISCONTINUED | OUTPATIENT
Start: 2025-01-31 | End: 2025-02-01 | Stop reason: HOSPADM

## 2025-01-31 RX ORDER — LABETALOL HYDROCHLORIDE 5 MG/ML
20 INJECTION, SOLUTION INTRAVENOUS EVERY 10 MIN PRN
Status: DISCONTINUED | OUTPATIENT
Start: 2025-01-31 | End: 2025-02-01 | Stop reason: HOSPADM

## 2025-01-31 RX ORDER — SACUBITRIL AND VALSARTAN 97; 103 MG/1; MG/1
1 TABLET, FILM COATED ORAL 2 TIMES DAILY
Status: DISCONTINUED | OUTPATIENT
Start: 2025-01-31 | End: 2025-02-01 | Stop reason: HOSPADM

## 2025-01-31 RX ORDER — DULAGLUTIDE 1.5 MG/.5ML
1.5 INJECTION, SOLUTION SUBCUTANEOUS WEEKLY
COMMUNITY

## 2025-01-31 RX ORDER — LABETALOL HYDROCHLORIDE 5 MG/ML
10 INJECTION, SOLUTION INTRAVENOUS EVERY 10 MIN PRN
Status: DISCONTINUED | OUTPATIENT
Start: 2025-01-31 | End: 2025-02-01 | Stop reason: HOSPADM

## 2025-01-31 RX ORDER — ROSUVASTATIN CALCIUM 20 MG/1
40 TABLET, COATED ORAL DAILY
Status: DISCONTINUED | OUTPATIENT
Start: 2025-02-01 | End: 2025-01-31

## 2025-01-31 RX ORDER — WARFARIN 2.5 MG/1
5 TABLET ORAL DAILY
Status: DISCONTINUED | OUTPATIENT
Start: 2025-02-01 | End: 2025-02-01

## 2025-01-31 RX ORDER — ROSUVASTATIN CALCIUM 20 MG/1
40 TABLET, COATED ORAL NIGHTLY
Status: DISCONTINUED | OUTPATIENT
Start: 2025-01-31 | End: 2025-02-01 | Stop reason: HOSPADM

## 2025-01-31 RX ORDER — DAPAGLIFLOZIN 10 MG/1
10 TABLET, FILM COATED ORAL DAILY
Status: DISCONTINUED | OUTPATIENT
Start: 2025-02-01 | End: 2025-02-01 | Stop reason: HOSPADM

## 2025-01-31 RX ORDER — GABAPENTIN 100 MG/1
200 CAPSULE ORAL 2 TIMES DAILY
Status: DISCONTINUED | OUTPATIENT
Start: 2025-01-31 | End: 2025-02-01 | Stop reason: HOSPADM

## 2025-01-31 RX ORDER — INSULIN LISPRO 100 [IU]/ML
0-5 INJECTION, SOLUTION INTRAVENOUS; SUBCUTANEOUS
Status: DISCONTINUED | OUTPATIENT
Start: 2025-02-01 | End: 2025-01-31

## 2025-01-31 RX ORDER — DEXTROSE 50 % IN WATER (D50W) INTRAVENOUS SYRINGE
25
Status: DISCONTINUED | OUTPATIENT
Start: 2025-01-31 | End: 2025-02-01 | Stop reason: HOSPADM

## 2025-01-31 RX ORDER — FUROSEMIDE 40 MG/1
40 TABLET ORAL DAILY PRN
Status: DISCONTINUED | OUTPATIENT
Start: 2025-01-31 | End: 2025-02-01 | Stop reason: HOSPADM

## 2025-01-31 RX ORDER — POLYETHYLENE GLYCOL 3350 17 G/17G
17 POWDER, FOR SOLUTION ORAL DAILY
Status: DISCONTINUED | OUTPATIENT
Start: 2025-02-01 | End: 2025-01-31

## 2025-01-31 RX ORDER — INSULIN LISPRO 100 [IU]/ML
0-10 INJECTION, SOLUTION INTRAVENOUS; SUBCUTANEOUS EVERY 4 HOURS
Status: DISCONTINUED | OUTPATIENT
Start: 2025-01-31 | End: 2025-02-01 | Stop reason: HOSPADM

## 2025-01-31 RX ORDER — HYDRALAZINE HYDROCHLORIDE 25 MG/1
25 TABLET, FILM COATED ORAL EVERY 6 HOURS PRN
Status: DISCONTINUED | OUTPATIENT
Start: 2025-02-02 | End: 2025-02-01 | Stop reason: HOSPADM

## 2025-01-31 RX ORDER — DULOXETIN HYDROCHLORIDE 30 MG/1
30 CAPSULE, DELAYED RELEASE ORAL 2 TIMES DAILY
Status: DISCONTINUED | OUTPATIENT
Start: 2025-01-31 | End: 2025-02-01 | Stop reason: HOSPADM

## 2025-01-31 RX ADMIN — IOHEXOL 75 ML: 350 INJECTION, SOLUTION INTRAVENOUS at 12:03

## 2025-01-31 RX ADMIN — ROSUVASTATIN CALCIUM 40 MG: 20 TABLET, FILM COATED ORAL at 22:20

## 2025-01-31 SDOH — SOCIAL STABILITY: SOCIAL INSECURITY: WITHIN THE LAST YEAR, HAVE YOU BEEN AFRAID OF YOUR PARTNER OR EX-PARTNER?: NO

## 2025-01-31 SDOH — ECONOMIC STABILITY: TRANSPORTATION INSECURITY: IN THE PAST 12 MONTHS, HAS LACK OF TRANSPORTATION KEPT YOU FROM MEDICAL APPOINTMENTS OR FROM GETTING MEDICATIONS?: NO

## 2025-01-31 SDOH — ECONOMIC STABILITY: FOOD INSECURITY: WITHIN THE PAST 12 MONTHS, THE FOOD YOU BOUGHT JUST DIDN'T LAST AND YOU DIDN'T HAVE MONEY TO GET MORE.: NEVER TRUE

## 2025-01-31 SDOH — ECONOMIC STABILITY: HOUSING INSECURITY: IN THE LAST 12 MONTHS, WAS THERE A TIME WHEN YOU WERE NOT ABLE TO PAY THE MORTGAGE OR RENT ON TIME?: NO

## 2025-01-31 SDOH — SOCIAL STABILITY: SOCIAL INSECURITY
WITHIN THE LAST YEAR, HAVE YOU BEEN RAPED OR FORCED TO HAVE ANY KIND OF SEXUAL ACTIVITY BY YOUR PARTNER OR EX-PARTNER?: NO

## 2025-01-31 SDOH — HEALTH STABILITY: MENTAL HEALTH: HOW MANY DRINKS CONTAINING ALCOHOL DO YOU HAVE ON A TYPICAL DAY WHEN YOU ARE DRINKING?: 3 OR 4

## 2025-01-31 SDOH — HEALTH STABILITY: MENTAL HEALTH: HOW OFTEN DO YOU HAVE SIX OR MORE DRINKS ON ONE OCCASION?: NEVER

## 2025-01-31 SDOH — SOCIAL STABILITY: SOCIAL INSECURITY: WITHIN THE LAST YEAR, HAVE YOU BEEN HUMILIATED OR EMOTIONALLY ABUSED IN OTHER WAYS BY YOUR PARTNER OR EX-PARTNER?: NO

## 2025-01-31 SDOH — ECONOMIC STABILITY: HOUSING INSECURITY: AT ANY TIME IN THE PAST 12 MONTHS, WERE YOU HOMELESS OR LIVING IN A SHELTER (INCLUDING NOW)?: NO

## 2025-01-31 SDOH — SOCIAL STABILITY: SOCIAL NETWORK: HOW OFTEN DO YOU GET TOGETHER WITH FRIENDS OR RELATIVES?: TWICE A WEEK

## 2025-01-31 SDOH — SOCIAL STABILITY: SOCIAL NETWORK: HOW OFTEN DO YOU ATTEND CHURCH OR RELIGIOUS SERVICES?: NEVER

## 2025-01-31 SDOH — SOCIAL STABILITY: SOCIAL INSECURITY: WERE YOU ABLE TO COMPLETE ALL THE BEHAVIORAL HEALTH SCREENINGS?: YES

## 2025-01-31 SDOH — ECONOMIC STABILITY: HOUSING INSECURITY: IN THE PAST 12 MONTHS, HOW MANY TIMES HAVE YOU MOVED WHERE YOU WERE LIVING?: 0

## 2025-01-31 SDOH — SOCIAL STABILITY: SOCIAL INSECURITY
WITHIN THE LAST YEAR, HAVE YOU BEEN KICKED, HIT, SLAPPED, OR OTHERWISE PHYSICALLY HURT BY YOUR PARTNER OR EX-PARTNER?: NO

## 2025-01-31 SDOH — SOCIAL STABILITY: SOCIAL NETWORK
IN A TYPICAL WEEK, HOW MANY TIMES DO YOU TALK ON THE PHONE WITH FAMILY, FRIENDS, OR NEIGHBORS?: MORE THAN THREE TIMES A WEEK

## 2025-01-31 SDOH — ECONOMIC STABILITY: INCOME INSECURITY: IN THE PAST 12 MONTHS HAS THE ELECTRIC, GAS, OIL, OR WATER COMPANY THREATENED TO SHUT OFF SERVICES IN YOUR HOME?: NO

## 2025-01-31 SDOH — ECONOMIC STABILITY: FOOD INSECURITY: HOW HARD IS IT FOR YOU TO PAY FOR THE VERY BASICS LIKE FOOD, HOUSING, MEDICAL CARE, AND HEATING?: SOMEWHAT HARD

## 2025-01-31 SDOH — ECONOMIC STABILITY: FOOD INSECURITY: WITHIN THE PAST 12 MONTHS, YOU WORRIED THAT YOUR FOOD WOULD RUN OUT BEFORE YOU GOT THE MONEY TO BUY MORE.: NEVER TRUE

## 2025-01-31 SDOH — SOCIAL STABILITY: SOCIAL INSECURITY: ARE THERE ANY APPARENT SIGNS OF INJURIES/BEHAVIORS THAT COULD BE RELATED TO ABUSE/NEGLECT?: NO

## 2025-01-31 SDOH — SOCIAL STABILITY: SOCIAL INSECURITY: ARE YOU MARRIED, WIDOWED, DIVORCED, SEPARATED, NEVER MARRIED, OR LIVING WITH A PARTNER?: DIVORCED

## 2025-01-31 SDOH — HEALTH STABILITY: MENTAL HEALTH
DO YOU FEEL STRESS - TENSE, RESTLESS, NERVOUS, OR ANXIOUS, OR UNABLE TO SLEEP AT NIGHT BECAUSE YOUR MIND IS TROUBLED ALL THE TIME - THESE DAYS?: NOT AT ALL

## 2025-01-31 SDOH — HEALTH STABILITY: MENTAL HEALTH: HOW OFTEN DO YOU HAVE A DRINK CONTAINING ALCOHOL?: 2-4 TIMES A MONTH

## 2025-01-31 SDOH — SOCIAL STABILITY: SOCIAL INSECURITY: DO YOU FEEL UNSAFE GOING BACK TO THE PLACE WHERE YOU ARE LIVING?: NO

## 2025-01-31 SDOH — HEALTH STABILITY: MENTAL HEALTH: BEHAVIORAL HEALTH(WDL): WITHIN DEFINED LIMITS

## 2025-01-31 SDOH — SOCIAL STABILITY: SOCIAL INSECURITY: HAVE YOU HAD THOUGHTS OF HARMING ANYONE ELSE?: NO

## 2025-01-31 SDOH — HEALTH STABILITY: PHYSICAL HEALTH: ON AVERAGE, HOW MANY DAYS PER WEEK DO YOU ENGAGE IN MODERATE TO STRENUOUS EXERCISE (LIKE A BRISK WALK)?: 0 DAYS

## 2025-01-31 SDOH — SOCIAL STABILITY: SOCIAL INSECURITY: DOES ANYONE TRY TO KEEP YOU FROM HAVING/CONTACTING OTHER FRIENDS OR DOING THINGS OUTSIDE YOUR HOME?: NO

## 2025-01-31 SDOH — HEALTH STABILITY: PHYSICAL HEALTH: ON AVERAGE, HOW MANY MINUTES DO YOU ENGAGE IN EXERCISE AT THIS LEVEL?: 0 MIN

## 2025-01-31 SDOH — SOCIAL STABILITY: SOCIAL NETWORK
DO YOU BELONG TO ANY CLUBS OR ORGANIZATIONS SUCH AS CHURCH GROUPS, UNIONS, FRATERNAL OR ATHLETIC GROUPS, OR SCHOOL GROUPS?: NO

## 2025-01-31 SDOH — HEALTH STABILITY: PHYSICAL HEALTH
HOW OFTEN DO YOU NEED TO HAVE SOMEONE HELP YOU WHEN YOU READ INSTRUCTIONS, PAMPHLETS, OR OTHER WRITTEN MATERIAL FROM YOUR DOCTOR OR PHARMACY?: NEVER

## 2025-01-31 SDOH — SOCIAL STABILITY: SOCIAL NETWORK: HOW OFTEN DO YOU ATTEND MEETINGS OF THE CLUBS OR ORGANIZATIONS YOU BELONG TO?: NEVER

## 2025-01-31 SDOH — SOCIAL STABILITY: SOCIAL INSECURITY: HAS ANYONE EVER THREATENED TO HURT YOUR FAMILY OR YOUR PETS?: NO

## 2025-01-31 SDOH — SOCIAL STABILITY: SOCIAL INSECURITY: ABUSE: ADULT

## 2025-01-31 SDOH — SOCIAL STABILITY: SOCIAL INSECURITY: ARE YOU OR HAVE YOU BEEN THREATENED OR ABUSED PHYSICALLY, EMOTIONALLY, OR SEXUALLY BY ANYONE?: NO

## 2025-01-31 SDOH — SOCIAL STABILITY: SOCIAL INSECURITY: DO YOU FEEL ANYONE HAS EXPLOITED OR TAKEN ADVANTAGE OF YOU FINANCIALLY OR OF YOUR PERSONAL PROPERTY?: NO

## 2025-01-31 ASSESSMENT — COGNITIVE AND FUNCTIONAL STATUS - GENERAL
MOBILITY SCORE: 24
DAILY ACTIVITIY SCORE: 24
PATIENT BASELINE BEDBOUND: NO

## 2025-01-31 ASSESSMENT — COLUMBIA-SUICIDE SEVERITY RATING SCALE - C-SSRS
1. IN THE PAST MONTH, HAVE YOU WISHED YOU WERE DEAD OR WISHED YOU COULD GO TO SLEEP AND NOT WAKE UP?: NO
6. HAVE YOU EVER DONE ANYTHING, STARTED TO DO ANYTHING, OR PREPARED TO DO ANYTHING TO END YOUR LIFE?: NO
6. HAVE YOU EVER DONE ANYTHING, STARTED TO DO ANYTHING, OR PREPARED TO DO ANYTHING TO END YOUR LIFE?: NO
2. HAVE YOU ACTUALLY HAD ANY THOUGHTS OF KILLING YOURSELF?: NO
2. HAVE YOU ACTUALLY HAD ANY THOUGHTS OF KILLING YOURSELF?: NO
1. IN THE PAST MONTH, HAVE YOU WISHED YOU WERE DEAD OR WISHED YOU COULD GO TO SLEEP AND NOT WAKE UP?: NO

## 2025-01-31 ASSESSMENT — ACTIVITIES OF DAILY LIVING (ADL)
DRESSING YOURSELF: INDEPENDENT
ADEQUATE_TO_COMPLETE_ADL: YES
LACK_OF_TRANSPORTATION: NO
BATHING: INDEPENDENT
LACK_OF_TRANSPORTATION: NO
ASSISTIVE_DEVICE: CONTACTS
FEEDING YOURSELF: INDEPENDENT
HEARING - RIGHT EAR: FUNCTIONAL
PATIENT'S MEMORY ADEQUATE TO SAFELY COMPLETE DAILY ACTIVITIES?: NO
LACK_OF_TRANSPORTATION: NO
JUDGMENT_ADEQUATE_SAFELY_COMPLETE_DAILY_ACTIVITIES: NO
WALKS IN HOME: INDEPENDENT
LACK_OF_TRANSPORTATION: NO
GROOMING: INDEPENDENT
HEARING - LEFT EAR: FUNCTIONAL
TOILETING: INDEPENDENT

## 2025-01-31 ASSESSMENT — PAIN - FUNCTIONAL ASSESSMENT
PAIN_FUNCTIONAL_ASSESSMENT: 0-10

## 2025-01-31 ASSESSMENT — LIFESTYLE VARIABLES
SKIP TO QUESTIONS 9-10: 0
AUDIT-C TOTAL SCORE: 3
AUDIT-C TOTAL SCORE: 3
HOW OFTEN DO YOU HAVE 6 OR MORE DRINKS ON ONE OCCASION: NEVER
HOW MANY STANDARD DRINKS CONTAINING ALCOHOL DO YOU HAVE ON A TYPICAL DAY: 3 OR 4
AUDIT-C TOTAL SCORE: 3
AUDIT-C TOTAL SCORE: 3
SKIP TO QUESTIONS 9-10: 0
HOW OFTEN DO YOU HAVE A DRINK CONTAINING ALCOHOL: 2-4 TIMES A MONTH
SKIP TO QUESTIONS 9-10: 0

## 2025-01-31 ASSESSMENT — PAIN SCALES - GENERAL
PAINLEVEL_OUTOF10: 0 - NO PAIN
PAINLEVEL_OUTOF10: 0 - NO PAIN

## 2025-01-31 ASSESSMENT — PATIENT HEALTH QUESTIONNAIRE - PHQ9
SUM OF ALL RESPONSES TO PHQ9 QUESTIONS 1 & 2: 6
2. FEELING DOWN, DEPRESSED OR HOPELESS: NEARLY EVERY DAY
1. LITTLE INTEREST OR PLEASURE IN DOING THINGS: NEARLY EVERY DAY

## 2025-01-31 NOTE — PROGRESS NOTES
Spiritual Care Visit  Spiritual Care Request    Reason for Visit:  Crisis Visit: ED, Stroke/Brain Attack     Request Received From:       Focus of Care:  Visited With: Patient not available   Care Provided for Crisis Visit: Responded to the crisis/alert, Family not present     Refer to :          Spiritual Care Assessment    Spiritual Assessment:                      Care Provided:       Sense of Community and or Congregation Affiliation:  [employer]         Addressed Needs/Concerns and/or Laura Through:          Outcome:        Advance Directives:         Spiritual Care Annotation        Annotation: received notification for Stroke Alert. Patient was receiving direct support from the Medical team. Patient's family was not present. Spiritual care will provide follow up as requested. No other needs were identified at this time.

## 2025-01-31 NOTE — CONSULTS
"Consults    History Of Present Illness:  Historian: Patient and ED Provider   Jayde Daugherty is a 44 y.o. female presenting with symptoms of transient difficulty speaking/ expressing lasting for 2 min followed by R sided tingling and mild weakness. Weakness got better but continues to have mild tingling in R arm. .  Last known well: 10 am,   Had stroke symptoms resolved at time of presentation: No     Prior Functional Status (Modified Pelham Scale):  0 The patient has no residual symptoms.    Stroke Risk Factors:  Atrial Fibrillation, H/O CAD, and Low EF 20%     Last Recorded Vitals:  Blood pressure 101/82, pulse 85, temperature 36.9 °C (98.4 °F), resp. rate 19, height 1.626 m (5' 4\"), weight 87.1 kg (192 lb), SpO2 97%.      NIHSS:  1A. Level of Consciousness: 0  1B. Ask Month and Age: 0  1C. Blink Eyes & Squeeze Hands: 0  2. Best Gaze: 0  3. Visual: 0  4. Facial Palsy: 0  5A. Motor - Left Arm: 0  5B. Motor - Right Arm: 0 (right arm weakness and numbness)  6A. Motor - Left Le  6B. Motor - Right Le  7. Limb Ataxia: 0  8. Sensory Loss: 0  9. Best Language: 0  10. Dysarthria: 0  11. Extinction and Inattention: 0  NIH Stroke Scale: 0       Relevant Results:  LABS:  Glucose   Date Value Ref Range Status   2025 125 (H) 74 - 99 mg/dL Final     INR   Date Value Ref Range Status   2025 1.6 (H) 0.9 - 1.2 Final      Results for orders placed or performed during the hospital encounter of 25 (from the past 24 hours)   ECG 12 lead   Result Value Ref Range    Ventricular Rate 86 BPM    Atrial Rate 86 BPM    NH Interval 156 ms    QRS Duration 104 ms    QT Interval 398 ms    QTC Calculation(Bazett) 476 ms    P Axis 51 degrees    R Axis -68 degrees    T Axis 63 degrees    QRS Count 14 beats    Q Onset 215 ms    P Onset 137 ms    P Offset 193 ms    T Offset 414 ms    QTC Fredericia 449 ms   CBC and Auto Differential   Result Value Ref Range    WBC 2.4 (L) 4.4 - 11.3 x10*3/uL    nRBC 0.0 0.0 - 0.0 " /100 WBCs    RBC 4.61 4.00 - 5.20 x10*6/uL    Hemoglobin 14.2 12.0 - 16.0 g/dL    Hematocrit 41.6 36.0 - 46.0 %    MCV 90 80 - 100 fL    MCH 30.8 26.0 - 34.0 pg    MCHC 34.1 32.0 - 36.0 g/dL    RDW 12.1 11.5 - 14.5 %    Platelets 163 150 - 450 x10*3/uL    Neutrophils % 16.5 40.0 - 80.0 %    Immature Granulocytes %, Automated 0.0 0.0 - 0.9 %    Lymphocytes % 59.7 13.0 - 44.0 %    Monocytes % 15.7 2.0 - 10.0 %    Eosinophils % 6.8 0.0 - 6.0 %    Basophils % 1.3 0.0 - 2.0 %    Neutrophils Absolute 0.39 (L) 1.20 - 7.70 x10*3/uL    Immature Granulocytes Absolute, Automated 0.00 0.00 - 0.70 x10*3/uL    Lymphocytes Absolute 1.41 1.20 - 4.80 x10*3/uL    Monocytes Absolute 0.37 0.10 - 1.00 x10*3/uL    Eosinophils Absolute 0.16 0.00 - 0.70 x10*3/uL    Basophils Absolute 0.03 0.00 - 0.10 x10*3/uL   Comprehensive metabolic panel   Result Value Ref Range    Glucose 125 (H) 74 - 99 mg/dL    Sodium 132 (L) 136 - 145 mmol/L    Potassium 4.3 3.5 - 5.3 mmol/L    Chloride 102 98 - 107 mmol/L    Bicarbonate 22 21 - 32 mmol/L    Anion Gap 12 10 - 20 mmol/L    Urea Nitrogen 8 6 - 23 mg/dL    Creatinine 0.59 0.50 - 1.05 mg/dL    eGFR >90 >60 mL/min/1.73m*2    Calcium 8.6 8.6 - 10.3 mg/dL    Albumin 3.9 3.4 - 5.0 g/dL    Alkaline Phosphatase 44 33 - 110 U/L    Total Protein 7.5 6.4 - 8.2 g/dL    AST 19 9 - 39 U/L    Bilirubin, Total 0.4 0.0 - 1.2 mg/dL    ALT 11 7 - 45 U/L   Troponin I, High Sensitivity   Result Value Ref Range    Troponin I, High Sensitivity 7 0 - 13 ng/L   hCG, quantitative, pregnancy   Result Value Ref Range    HCG, Beta-Quantitative <2 <5 mIU/mL   Morphology   Result Value Ref Range    RBC Morphology See Below     Teardrop Cells Few    Urinalysis with Reflex Culture and Microscopic   Result Value Ref Range    Color, Urine Light-Yellow Light-Yellow, Yellow, Dark-Yellow    Appearance, Urine Clear Clear    Specific Gravity, Urine >1.050 (N) 1.005 - 1.035    pH, Urine 5.5 5.0, 5.5, 6.0, 6.5, 7.0, 7.5, 8.0    Protein, Urine  NEGATIVE NEGATIVE, 10 (TRACE), 20 (TRACE) mg/dL    Glucose, Urine 50 (TRACE) (A) Normal mg/dL    Blood, Urine NEGATIVE NEGATIVE    Ketones, Urine NEGATIVE NEGATIVE mg/dL    Bilirubin, Urine NEGATIVE NEGATIVE    Urobilinogen, Urine Normal Normal mg/dL    Nitrite, Urine NEGATIVE NEGATIVE    Leukocyte Esterase, Urine NEGATIVE NEGATIVE   Protime-INR   Result Value Ref Range    Protime 16.7 (H) 9.3 - 12.7 seconds    INR 1.6 (H) 0.9 - 1.2   APTT   Result Value Ref Range    aPTT 24.3 22.0 - 32.5 seconds     *Note: Due to a large number of results and/or encounters for the requested time period, some results have not been displayed. A complete set of results can be found in Results Review.        CT Head Imaging:  CTH imaging personally reviewed, showed no acute ischemic / hemorrhagic changes     CTA Head and Neck Imaging:  Personally reviewed, showed R M2 occlusion with distal reconstitution      Diagnosis:  Supect Acute Ischemic Stroke    IV Thrombolysis IV Thrombolysis Checklist      IV Thrombolysis Given: No; Thrombolysis contraindication reason: Neurologic signs are mild and judged to be non-disabling          Patient is a candidate for thrombectomy:  yes/no: No; contraindication reason: NIHSS <6    Pt denied any symptoms correlating to RM2 occlusion and is currently asymptomatic from it. Hence not a candidate for intervention.     Additional Recommendations:  Closer monitoring for 24 hrs and call [facility] for any new or worsening symptoms.   Stroke workup  MRI brain w/o contrast stroke protocol to evaluate stroke burden  TTE w/ bubble study  Lipid panel, A1c  Given subtherapeutic INR on coumadin and recurrent ischemic events, prefer transitioning to eliquis for low EF and A fib, Cardiology follow up for the same. Continue anticoagulation if MRI brain shows small stroke burden, stop if stroke burden is large, timing of restarting AC to be determined based on size.   Lipid Goals: education on healthy diet and  statin therapy not indicated or contraindicated   Blood pressure goals: avoid hypotension SBP <100 that could worsen cerebral perfusion, Ischemic stroke- early permissive hypertension SBP < 220 mmHg with cautious inpatient lowering  Glucose Goals: early treatment of hyperglycemia to goal glucose 140-180 mg/dl with long-term goal A1c < 7%   NPO until nurse bedside swallow assessment   Consider focused stroke order set   Smoking Cessation and Education  Assessment for Rehabilitation needs   Patient and family education on signs and symptoms of stroke, calling 911, healthy strategies for stroke prevention.      Disposition:  Patient will remain at referring facility for further evaluation and management.    Virtual or Telephone Consent    An interactive audio and video telecommunication system which permits real time communications between the patient (at the originating site) and provider (at the distant site) was utilized to provide this telehealth service.   Verbal consent was requested and obtained from Jayde Daugherty on this date, 01/31/25 for a telehealth visit.    Virtual Visit start time: 1210pm, 1/31/2025    Porter Nick MD

## 2025-01-31 NOTE — PROGRESS NOTES
01/31/25 1736   Discharge Planning   Living Arrangements Children   Support Systems Children   Type of Residence Private residence   Number of Stairs to Enter Residence 4   Number of Stairs Within Residence 24   Do you have animals or pets at home? Yes   Type of Animals or Pets 1 dog   Who is requesting discharge planning? Provider   Home or Post Acute Services None   Expected Discharge Disposition Home   Does the patient need discharge transport arranged? No   Financial Resource Strain   How hard is it for you to pay for the very basics like food, housing, medical care, and heating? Somewhat   Housing Stability   In the last 12 months, was there a time when you were not able to pay the mortgage or rent on time? N   In the past 12 months, how many times have you moved where you were living? 0   At any time in the past 12 months, were you homeless or living in a shelter (including now)? N   Transportation Needs   In the past 12 months, has lack of transportation kept you from medical appointments or from getting medications? no   In the past 12 months, has lack of transportation kept you from meetings, work, or from getting things needed for daily living? No   Patient Choice   Patient / Family choosing to utilize agency / facility established prior to hospitalization No   Stroke Family Assessment   Stroke Family Assessment Needed No

## 2025-01-31 NOTE — PROGRESS NOTES
01/31/25 1737   [facility] Disability Status   Are you deaf or do you have serious difficulty hearing? N   Are you blind or do you have serious difficulty seeing, even when wearing glasses? N   Because of a physical, mental, or emotional condition, do you have serious difficulty concentrating, remembering, or making decisions? (5 years old or older) N   Do you have serious difficulty walking or climbing stairs? N   Do you have serious difficulty dressing or bathing? N   Because of a physical, mental, or emotional condition, do you have serious difficulty doing errands alone such as visiting the doctor? N

## 2025-01-31 NOTE — PROGRESS NOTES
Pharmacy Medication History Review    Jayde Daugherty is a 44 y.o. female. Pharmacy reviewed the patient's [id number]-[id number] medications and allergies for accuracy.    Medications ADDED:  Trulicity   Medications CHANGED:  Gabapentin 100mg - 2BID  Warfarin 5mg - 1 Daily   Medications REMOVED:   Symbicort  Potassium 10mEq  Spironolactone 25mg      The list below reflects the updated PTA list. Comments regarding how patient may be taking medications differently can be found in the Admit Orders Activity  Prior to Admission Medications   Prescriptions Last Dose Informant   DULoxetine (Cymbalta) 30 mg DR capsule 2025 Self   Sig: Take 1 capsule (30 mg) by mouth 2 times a day.   albuterol 90 mcg/actuation inhaler Past Month Self   Sig: Inhale every 6 hours if needed.   clindamycin (Cleocin T) 1 % lotion Unknown Self   Si Application   clopidogrel (Plavix) 75 mg tablet 2025 Self   Sig: Take 1 tablet (75 mg) by mouth once daily.   dapagliflozin propanediol (Farxiga) 10 mg 2025 Self   Sig: Take 1 tablet (10 mg) by mouth once daily.   desipramine (Norpramin) 10 mg tablet 2025 Self   Sig: Take 1 tablet (10 mg) by mouth once daily.   dulaglutide (Trulicity) 1.5 mg/0.5 mL pen injector injection Past Week Self   Sig: Inject 1.5 mg under the skin 1 (one) time per week. sundays   furosemide (Lasix) 40 mg tablet Unknown Self   Sig: Take 1 tablet (40 mg) by mouth if needed.   gabapentin (Neurontin) 100 mg capsule 2025 Self   Sig: Take 2 capsules (200 mg) by mouth 2 times a day.   metoprolol succinate XL (Toprol-XL) 200 mg 24 hr tablet 2025 Self   Sig: Take 1 tablet (200 mg) by mouth once daily.   montelukast (Singulair) 10 mg tablet Past Week Self   Sig: Take 1 tablet (10 mg) by mouth once daily.   rosuvastatin (Crestor) 40 mg tablet 2025 Self   Sig: Take 1 tablet (40 mg) by mouth once daily.   sacubitriL-valsartan (Entresto)  mg tablet 2025 Self   Sig: Take 1 tablet by mouth 2  times a day.   sertraline (Zoloft) 100 mg tablet 1/30/2025 Self   Sig: Take 1 tablet (100 mg) by mouth once daily.   warfarin (Coumadin) 5 mg tablet 1/30/2025 Self   Sig: TAKE 1 TABLET BY MOUTH ONCE DAILY      Facility-Administered Medications: None        The list below reflects the updated allergy list. Please review each documented allergy for additional clarification and justification.  Allergies  Reviewed by Carolee Suh CPhT on 1/31/2025        Severity Reactions Comments    Hay Fever And Allergy Relief Not Specified Other Sneezing, itchy eyes            Pharmacy has been updated to [facility] in [place].    Sources used to complete the med history include dispense history, PTA medication list, patient interview. Patient is a good historian.    Below are additional concerns with the patient's PTA list.  none    Carolee Suh CPhT-Adv  Please reach out via [employer] Secure Chat for questions

## 2025-01-31 NOTE — PROGRESS NOTES
Attestation/Supervisory note for ZORA García      The patient is a 44-year-old female presenting to the emergency department from work by EMS for evaluation of a possible stroke.  The patient states that around 1030 this morning he had an approximate 2-minute episode in which she had some numbness and tingling of the right side of her face and right arm.  She states that she had some difficulty speaking.  She states that she knew what she was trying to say but it was coming out garbled.  She states that coworkers noticed the same thing.  She states that she also felt like her right arm was slightly weaker than the left arm.  She states that her symptoms did seem to resolve within 2 minutes but she still felt a little weakness on the right arm.  She denies any headache or visual changes.  She denies any difficulty swallowing.  No neck or back pain.  No chest pain or shortness of breath.  No abdominal pain.  No nausea vomiting.  No diarrhea or constipation but no urinary complaints.  No fever or chills.  No cough or congestion.  No recent injury or trauma.  The patient states that she does not have any history of CVA or TIA but she does have a history of atrial fibrillation, CAD/ACS with previous stent placement, hypertension and hyperlipidemia.  She does take Coumadin daily.  All pertinent positives and negatives are recorded above.  All other systems reviewed and otherwise negative.  Vital signs with diastolic hypertension but otherwise within normal limits.  Physical exam with a well-nourished well-developed female in no acute distress.  HEENT exam within normal limits.  She has no evidence of airway compromise or respiratory distress.  Abdominal exam is benign.  She does not have any gross motor, neurologic or vascular episodes on exam at this time other than a slight subjective decrease in sensation of the right upper extremity compared to the left upper extremity.  NIH stroke scale score of 1 event for  this.      tPA/TNK is not indicated given NIH stroke scale score of 1 at this time.      Code brain attack activated and telestroke neurology was consulted      EKG with sinus rhythm at 86 bpm, occasional PVCs, leftward axis, normal voltage, normal ST segment, no slight diffuse flattening of the T waves      Diagnostic labs with mild leukopenia and mild electrolyte imbalance but otherwise unremarkable.      Initial troponin 7.  Repeat troponin pending at the time of admission      XR chest 1 view   Final Result   1.  No evidence of acute cardiopulmonary process.                  MACRO:   None        Signed by: Richy Mccann 1/31/2025 1:26 PM   Dictation workstation:   [id number]      CT brain attack angio head and neck W and WO IV contrast   Final Result   Large vessel occlusion of the mid M2 inferior division branch of the   right MCA with symmetric appearance of bilateral distal M3/M4   branches of the inferior divisions of the MCAs.        Poor visualization of the M2 superior division branches of the right   MCA without focal definite occlusion appreciated.        Remaining intracranial vasculature is within normal limits.        Cervical vasculature is unremarkable.        Critical Finding:  See findings. Notification was initiated on   1/31/2025 at 12:28 pm by  Ignacio Ragland.  (**-OCF-**)        _____________   NASCET criteria for internal carotid artery stenosis:   Mild: 0% to 49%   Moderate: 50% to 69%   Severe: 70% to 99%   Complete Occlusion        Signed by: Ignacio Ragland 1/31/2025 12:29 PM   Dictation workstation:   [id number]      CT brain attack head wo IV contrast   Final Result   Faint skull base arterial calcifications in each carotid siphon.        No acute intracranial bleed or focal mass effect.        MACRO:   Alexander Flowers discussed the significance and urgency of this critical   finding by epic secure chat with  JAMIL BARAKAT on 1/31/2025 at 11:57   am.  (**-RCF-**) Findings:  See findings.         Signed by: Alexander Flowers 1/31/2025 11:58 AM   Dictation workstation:   [id number]         The patient does not have any evidence of a STEMI on EKG or cardiac enzymes.  No events on telemetry.  Diagnostic labs without significant abnormality.  Chest x-ray without acute process.  No evidence of pneumonia or pneumothorax.  No evidence of CHF.  No widening of the mediastinum.  Her pulses are equal bilaterally.  The patient has subjective decrease sensation in the right upper extremity compared to the left upper extremity but has no other gross motor, neurologic or vascular deficits on exam.  tPA/TNK is not indicated for this reason.  The patient also takes Coumadin daily.  The case was discussed with telestroke neurology and they did evaluate the patient by video monitor.  They agree that tPA/TNK is not indicated at this time.  CT head without evidence of an intracranial hemorrhage, mass effect or CVA.  The CT angio head and neck showed a large vessel occlusion of the mid M2 inferior division branch of the right MCA with symmetrical appearance of the bilateral distal M3 and M4 branches of the inferior divisions of the MCAs.  The telestroke neurologist recommended ICU admission at this facility due to the complexity of her chronic medical conditions but they did not feel that she warranted transfer for intervention at this time to [facility].  The patient was admitted by the ICU team for further management of her symptoms and neurologic consultation.        Impression/diagnosis:  1.  Right-sided paresthesias  2.  Expressive aphasia, by report  3.  Right arm weakness, by report  4.   Diastolic hypertension  5.    TIA      Critical care time of  34 minutes billed for management of the code brain attack, activation of the code brain attack, consultation with telestroke neurology, monitoring the patient telemetry, consultation with radiology, consideration of tPA inclusion and exclusion criteria, and arrangement  for admission.  This time excludes time for billable procedures.      critical care time billed for by me is non concurrent with time billed for by ZORA Jacob      I personally saw the patient and made/approve the management plan and take responsibility for the patient management.      I independently interpreted the following study (S) EKG and diagnostic labs      I personally discussed the patient's management with the patient      I reviewed the results of the diagnostic labs and diagnostic imaging.  Formal radiology read was completed by the radiologist.      Mercy Urban MD

## 2025-01-31 NOTE — ED PROVIDER NOTES
HPI   Chief Complaint   Patient presents with    Cerebrovascular Accident     Patient arrives via ems with comploaints of dysarthria and confusion. Patient has most deficits resolved except some right arm weakness and numbness. History of MI,CHF,AFIB, and has a pacemaker defib.       HPI  Patient is a 44-year-old female with history of lupus, CAD, atrial fibrillation on Coumadin brought in by EMS for concerns of transient expressive aphasia that lasted approximately 2 minutes at 10:30 AM.  Patient states this is since resolved but after this resolved she developed numbness of the right side of her face and her right upper extremity and feels her right upper extremity is somewhat weaker than her left.  Denies any involvement of the left side or lower extremities bilaterally.  Denies any vision changes or headache.  Denies chest pain or shortness of breath.  States symptoms have somewhat improved but still has numbness and weakness in her right upper extremity.  Patient does not endorse any neck pain.      Patient History   Past Medical History:   Diagnosis Date    Myocardial infarction (Multi)     x 2 with significant apical involvement    Personal history of nicotine dependence 06/10/2019    Former cigarette smoker    Personal history of other diseases of the circulatory system 2014    History of atrial fibrillation    Personal history of other diseases of the respiratory system 2022    History of acute bronchitis    Systemic lupus erythematosus, unspecified (Multi) 2013    Systemic lupus erythematosus     Past Surgical History:   Procedure Laterality Date    CERVICAL BIOPSY  W/ LOOP ELECTRODE EXCISION  2006    Cervical Loop Electrosurgical Excision (LEEP)     SECTION, LOW TRANSVERSE      HYSTERECTOMY  2014    peripartum, supracervical, at [facility]    MR HEAD ANGIO WO IV CONTRAST  2013    MR HEAD ANGIO WO IV CONTRAST 2013 [facility]    MR NECK ANGIO WO IV CONTRAST   2013    MR NECK ANGIO WO IV CONTRAST 2013 [facility]    OTHER SURGICAL HISTORY  2019    Cardioverter defibrillator insertion    TOTAL HIP ARTHROPLASTY Bilateral 2014    Hip Replacement     No family history on file.  Social History     Tobacco Use    Smoking status: Former     Current packs/day: 0.00     Average packs/day: 1 pack/day for 25.0 years (25.0 ttl pk-yrs)     Types: Cigarettes     Start date:      Quit date: 2016     Years since quittin.0    Smokeless tobacco: Never   Vaping Use    Vaping status: Never Used   Substance Use Topics    Alcohol use: Yes     Alcohol/week: 3.0 standard drinks of alcohol     Types: 3 Glasses of wine per week    Drug use: Not Currently       Physical Exam   ED Triage Vitals [25 1129]   Temperature Heart Rate Respirations BP   36.9 °C (98.4 °F) 93 18 114/86      Pulse Ox Temp src Heart Rate Source Patient Position   97 % -- -- --      BP Location FiO2 (%)     -- --       Physical Exam  Vitals and nursing note reviewed.   Constitutional:       General: She is not in acute distress.     Appearance: She is well-developed.   HENT:      Head: Normocephalic and atraumatic.   Eyes:      Conjunctiva/sclera: Conjunctivae normal.   Cardiovascular:      Rate and Rhythm: Normal rate and regular rhythm.      Heart sounds: No murmur heard.  Pulmonary:      Effort: Pulmonary effort is normal. No respiratory distress.      Breath sounds: Normal breath sounds.   Abdominal:      Palpations: Abdomen is soft.      Tenderness: There is no abdominal tenderness.   Musculoskeletal:         General: No swelling.      Cervical back: Neck supple.   Skin:     General: Skin is warm and dry.      Capillary Refill: Capillary refill takes less than 2 seconds.   Neurological:      Mental Status: She is alert.      Comments: Subjective numbness of the right upper extremity with 3 out of 5 strength of the right upper extremity compared to left upper extremity, no drift  of the extremities bilaterally.  No facial droop or slurred speech.  No vision deficits or gaze deficit.  No limb ataxia.  NIH stroke scale score of 1   Psychiatric:         Mood and Affect: Mood normal.           ED Course & MDM   ED Course as of 01/31/25 1814 Fri Jan 31, 2025   1252 Patient does have a positive right M2 occlusion which does not correlate with her symptoms did discuss with stroke neurology given minimal deficits with an NIH stroke scale score of only 1 at this time will defer any tPA or TNK administration.  No need for transfer for thrombectomy.  Will admit for MRI of the brain and further evaluation of symptoms. [JJ]   1253 Neurologist does recommend ICU admission for frequent neurochecks for monitoring of symptoms. [JJ]      ED Course User Index  [JJ] Janette Jacob PA-C         Diagnoses as of 01/31/25 1814   Expressive aphasia   Right arm weakness   Paresthesia   Diastolic hypertension                 No data recorded     Chuck Coma Scale Score: 15 (01/31/25 1415 : Abhinav Eaton RN)       NIH Stroke Scale: 0 (01/31/25 1207 : Abhinav Eaton RN)                   Medical Decision Making  Parts of this chart have been completed using voice recognition software. Please excuse any errors of transcription.  My thought process and reason for plan has been formulated from the time that I saw the patient until the time of disposition and is not specific to one specific moment during their visit and furthermore my MDM encompasses this entire chart and not only this text box.      HPI: Detailed above.    Exam: A medically appropriate exam performed, outlined above, given the known history and presentation.    History obtained from: Patient, EMS    EKG: Interpreted by attending physician and reviewed by me    Social Determinants of Health considered during this visit: Lives independently    Medications given during visit:  Medications   iohexol (OMNIPaque) 350 mg iodine/mL solution 75 mL (75 mL intravenous  Given 1/31/25 1203)        Diagnostic/tests  Labs Reviewed   CBC WITH AUTO DIFFERENTIAL - Abnormal       Result Value    WBC 2.4 (*)     nRBC 0.0      RBC 4.61      Hemoglobin 14.2      Hematocrit 41.6      MCV 90      MCH 30.8      MCHC 34.1      RDW 12.1      Platelets 163      Neutrophils % 16.5      Immature Granulocytes %, Automated 0.0      Lymphocytes % 59.7      Monocytes % 15.7      Eosinophils % 6.8      Basophils % 1.3      Neutrophils Absolute 0.39 (*)     Immature Granulocytes Absolute, Automated 0.00      Lymphocytes Absolute 1.41      Monocytes Absolute 0.37      Eosinophils Absolute 0.16      Basophils Absolute 0.03     COMPREHENSIVE METABOLIC PANEL - Abnormal    Glucose 125 (*)     Sodium 132 (*)     Potassium 4.3      Chloride 102      Bicarbonate 22      Anion Gap 12      Urea Nitrogen 8      Creatinine 0.59      eGFR >90      Calcium 8.6      Albumin 3.9      Alkaline Phosphatase 44      Total Protein 7.5      AST 19      Bilirubin, Total 0.4      ALT 11     PROTIME-INR - Abnormal    Protime 16.7 (*)     INR 1.6 (*)     Narrative:     INR Therapeutic Range: 2.0-3.5   URINALYSIS WITH REFLEX CULTURE AND MICROSCOPIC - Abnormal    Color, Urine Light-Yellow      Appearance, Urine Clear      Specific Gravity, Urine >1.050 (*)     pH, Urine 5.5      Protein, Urine NEGATIVE      Glucose, Urine 50 (TRACE) (*)     Blood, Urine NEGATIVE      Ketones, Urine NEGATIVE      Bilirubin, Urine NEGATIVE      Urobilinogen, Urine Normal      Nitrite, Urine NEGATIVE      Leukocyte Esterase, Urine NEGATIVE     TROPONIN I, HIGH SENSITIVITY - Normal    Troponin I, High Sensitivity 7      Narrative:     Less than 99th percentile of normal range cutoff-  Female and children under 18 years old <14 ng/L; Male <21 ng/L: Negative  Repeat testing should be performed if clinically indicated.     Female and children under 18 years old 14-50 ng/L; Male 21-50 ng/L:  Consistent with possible cardiac damage and possible increased  clinical   risk. Serial measurements may help to assess extent of myocardial damage.     >50 ng/L: Consistent with cardiac damage, increased clinical risk and  myocardial infarction. Serial measurements may help assess extent of   myocardial damage.      NOTE: Children less than 1 year old may have higher baseline troponin   levels and results should be interpreted in conjunction with the overall   clinical context.     NOTE: Troponin I testing is performed using a different   testing methodology at [facility] than at other   [facility]. Direct result comparisons should only   be made within the same method.   APTT - Normal    aPTT 24.3     HUMAN CHORIONIC GONADOTROPIN, SERUM QUANTITATIVE - Normal    HCG, Beta-Quantitative <2      Narrative:      Total HCG measurement is performed using the [employer] Access   Immunoassay which detects intact HCG and free beta HCG subunit.    This test is not indicated for use as a tumor marker.   HCG testing is performed using a different test methodology at [facility] than other [facility]. Direct result comparison   should only be made within the same method.       URINALYSIS WITH REFLEX CULTURE AND MICROSCOPIC    Narrative:     The following orders were created for panel order Urinalysis with Reflex Culture and Microscopic.  Procedure                               Abnormality         Status                     ---------                               -----------         ------                     Urinalysis with Reflex C...[[id number]]  Abnormal            Final result               Extra Urine Gray Tube[[id number]]                            In process                   Please view results for these tests on the individual orders.   EXTRA URINE GRAY TUBE   POCT GLUCOSE METER   MORPHOLOGY    RBC Morphology See Below      Teardrop Cells Few        XR chest 1 view   Final Result   1.  No evidence of acute cardiopulmonary process.                   MACRO:   None        Signed by: Richy Mccann 1/31/2025 1:26 PM   Dictation workstation:   [id number]      CT brain attack angio head and neck W and WO IV contrast   Final Result   Large vessel occlusion of the mid M2 inferior division branch of the   right MCA with symmetric appearance of bilateral distal M3/M4   branches of the inferior divisions of the MCAs.        Poor visualization of the M2 superior division branches of the right   MCA without focal definite occlusion appreciated.        Remaining intracranial vasculature is within normal limits.        Cervical vasculature is unremarkable.        Critical Finding:  See findings. Notification was initiated on   1/31/2025 at 12:28 pm by  Ignacio Ragland.  (**-OCF-**)        _____________   NASCET criteria for internal carotid artery stenosis:   Mild: 0% to 49%   Moderate: 50% to 69%   Severe: 70% to 99%   Complete Occlusion        Signed by: Ignacio Ragland 1/31/2025 12:29 PM   Dictation workstation:   [id number]      CT brain attack head wo IV contrast   Final Result   Faint skull base arterial calcifications in each carotid siphon.        No acute intracranial bleed or focal mass effect.        MACRO:   Alexander Flowers discussed the significance and urgency of this critical   finding by epic secure chat with  JAMIL BARAKAT on 1/31/2025 at 11:57   am.  (**-RCF-**) Findings:  See findings.        Signed by: Alexander Flowers 1/31/2025 11:58 AM   Dictation workstation:   [id number]           Considerations/further MDM:  Patient is a 44-year-old female presenting for evaluation of possible CVA, transient expressive aphasia and confusion, right upper extremity numbness and weakness    I saw this patient in conjunction with Dr. Urban    Patient is awake alert in no apparent distress during this visit.  NIH stroke scale score upon arrival is 1 for subjective numbness of the right upper extremity but patient does have 3 out of 5 strength of the right upper  extremity compared to 5 out of 5 strength of the left upper extremity.  The patient has no facial droop or vision deficits and no limb ataxia on exam.  A code brain attack was activated in the ER and stroke neurology was consulted.  Given low NIH and minimal deficits at this time, stroke neurology does not recommend any tPA or TNK administration and does not recommend thrombectomy.  CT angio head and neck for brain attack was performed with evidence of a right inferior M2 occlusion which does not correlate with patient's symptoms and the patient has no left-sided deficits at this time nor did she ever have any left-sided deficits today.  Workup is otherwise unremarkable. Did discuss these findings with stroke neurology and recommend ICU admission for frequent neuro checks and monitoring for symptoms.  On reexamination, patient actually reports improvement of her symptoms and feels that her strength and sensation in her right upper extremity is intact at this time.  Given full bed capacity at [facility], care discussed with ICU attending at [facility] Dr. Zelaya who accepted the patient for transfer for further evaluation and management of symptoms.  Patient remained stable during the ER visit and is agreeable with this plan of care.  Awaiting transfer at the time of my signout.      Procedure  Critical Care    Performed by: Janette Jacob PA-C  Authorized by: Mercy Urban MD    Critical care provider statement:     Critical care time (minutes):  35    Critical care time was exclusive of:  Separately billable procedures and treating other patients    Critical care was necessary to treat or prevent imminent or life-threatening deterioration of the following conditions: Brain attack.    Critical care was time spent personally by me on the following activities:  Development of treatment plan with patient or surrogate, discussions with consultants, evaluation of patient's response to treatment, examination of  patient, obtaining history from patient or surrogate, ordering and performing treatments and interventions, ordering and review of laboratory studies, ordering and review of radiographic studies and re-evaluation of patient's condition    Care discussed with: accepting provider at another facility         Janette Jacob PA-C  01/31/25 5645

## 2025-01-31 NOTE — PROGRESS NOTES
01/31/25 1734   Physical Activity   On average, how many days per week do you engage in moderate to strenuous exercise (like a brisk walk)? 0 days   On average, how many minutes do you engage in exercise at this level? 0 min   Financial Resource Strain   How hard is it for you to pay for the very basics like food, housing, medical care, and heating? Somewhat   Housing Stability   In the last 12 months, was there a time when you were not able to pay the mortgage or rent on time? N   In the past 12 months, how many times have you moved where you were living? 0   At any time in the past 12 months, were you homeless or living in a shelter (including now)? N   Transportation Needs   In the past 12 months, has lack of transportation kept you from medical appointments or from getting medications? no   In the past 12 months, has lack of transportation kept you from meetings, work, or from getting things needed for daily living? No   Food Insecurity   Within the past 12 months, you worried that your food would run out before you got the money to buy more. Never true   Within the past 12 months, the food you bought just didn't last and you didn't have money to get more. Never true   Stress   Do you feel stress - tense, restless, nervous, or anxious, or unable to sleep at night because your mind is troubled all the time - these days? Not at all   Social Connections   In a typical week, how many times do you talk on the phone with family, friends, or neighbors? More than 3   How often do you get together with friends or relatives? Twice   How often do you attend Judaism or Islam services? Never   Do you belong to any clubs or organizations such as Judaism groups, unions, fraternal or athletic groups, or school groups? No   How often do you attend meetings of the clubs or organizations you belong to? Never   Are you , , , , never , or living with a partner?     Intimate Partner Violence   Within the last year, have you been afraid of your partner or ex-partner? No   Within the last year, have you been humiliated or emotionally abused in other ways by your partner or ex-partner? No   Within the last year, have you been kicked, hit, slapped, or otherwise physically hurt by your partner or ex-partner? No   Within the last year, have you been raped or forced to have any kind of sexual activity by your partner or ex-partner? No   Alcohol Use   Q1: How often do you have a drink containing alcohol? 2-4 pr month   Q2: How many drinks containing alcohol do you have on a typical day when you are drinking? 3 or 4   Q3: How often do you have six or more drinks on one occasion? Never   Utilities   In the past 12 months has the electric, gas, oil, or water company threatened to shut off services in your home? No   Health Literacy   How often do you need to have someone help you when you read instructions, pamphlets, or other written material from your doctor or pharmacy? Never

## 2025-01-31 NOTE — ED PROCEDURE NOTE
Procedure  Critical Care    Performed by: Mercy Urban MD  Authorized by: Mercy Urban MD    Critical care provider statement:     Critical care time (minutes):  34    Critical care time was exclusive of:  Teaching time and separately billable procedures and treating other patients    Critical care was necessary to treat or prevent imminent or life-threatening deterioration of the following conditions: Code brain attack.    Critical care was time spent personally by me on the following activities:  Obtaining history from patient or surrogate, examination of patient, evaluation of patient's response to treatment, discussions with consultants, development of treatment plan with patient or surrogate, blood draw for specimens, ordering and performing treatments and interventions, ordering and review of laboratory studies, ordering and review of radiographic studies, pulse oximetry and re-evaluation of patient's condition    Care discussed with: admitting provider    Comments:      Critical care time of  34 minutes billed for management of the code brain attack, activation of the code brain attack, consultation with telestroke neurology, monitoring the patient telemetry, consultation with radiology, consideration of tPA inclusion and exclusion criteria, and arrangement for admission.  This time excludes time for billable procedures.      critical care time billed for by me is non concurrent with time billed for by ZORA Urban MD  01/31/25 [id number]

## 2025-02-01 ENCOUNTER — APPOINTMENT (OUTPATIENT)
Dept: RADIOLOGY | Facility: HOSPITAL | Age: 45
End: 2025-02-01
Payer: MEDICAID

## 2025-02-01 ENCOUNTER — TELEPHONE (OUTPATIENT)
Dept: CRITICAL CARE MEDICINE | Facility: HOSPITAL | Age: 45
End: 2025-02-01

## 2025-02-01 ENCOUNTER — APPOINTMENT (OUTPATIENT)
Dept: CARDIOLOGY | Facility: HOSPITAL | Age: 45
End: 2025-02-01
Payer: MEDICAID

## 2025-02-01 VITALS
SYSTOLIC BLOOD PRESSURE: 110 MMHG | HEART RATE: 88 BPM | OXYGEN SATURATION: 99 % | DIASTOLIC BLOOD PRESSURE: 67 MMHG | RESPIRATION RATE: 24 BRPM | WEIGHT: 187.83 LBS | BODY MASS INDEX: 32.07 KG/M2 | TEMPERATURE: 97.3 F | HEIGHT: 64 IN

## 2025-02-01 LAB
ALBUMIN SERPL BCP-MCNC: 3.6 G/DL (ref 3.4–5)
ALP SERPL-CCNC: 39 U/L (ref 33–110)
ALT SERPL W P-5'-P-CCNC: 12 U/L (ref 7–45)
ANION GAP SERPL CALC-SCNC: 11 MMOL/L (ref 10–20)
APTT PPP: 77 SECONDS (ref 27–38)
AST SERPL W P-5'-P-CCNC: 14 U/L (ref 9–39)
B2 GLYCOPROT1 IGA SER-ACNC: 4.1 U/ML
B2 GLYCOPROT1 IGG SER-ACNC: <1.4 U/ML
B2 GLYCOPROT1 IGM SER-ACNC: 0.3 U/ML
BASOPHILS # BLD AUTO: 0.03 X10*3/UL (ref 0–0.1)
BASOPHILS NFR BLD AUTO: 1.1 %
BILIRUB SERPL-MCNC: 0.4 MG/DL (ref 0–1.2)
BUN SERPL-MCNC: 10 MG/DL (ref 6–23)
C3 SERPL-MCNC: 146 MG/DL (ref 87–200)
C4 SERPL-MCNC: 31 MG/DL (ref 10–50)
CALCIUM SERPL-MCNC: 8.9 MG/DL (ref 8.6–10.3)
CARDIOLIPIN IGA SERPL-ACNC: 5.4 APL U/ML
CARDIOLIPIN IGG SER IA-ACNC: <1.6 GPL U/ML
CARDIOLIPIN IGM SER IA-ACNC: 0.6 MPL U/ML
CHLORIDE SERPL-SCNC: 102 MMOL/L (ref 98–107)
CO2 SERPL-SCNC: 28 MMOL/L (ref 21–32)
CREAT SERPL-MCNC: 0.77 MG/DL (ref 0.5–1.05)
CRP SERPL-MCNC: <0.1 MG/DL
DSDNA AB SER-ACNC: 1 IU/ML
EGFRCR SERPLBLD CKD-EPI 2021: >90 ML/MIN/1.73M*2
EJECTION FRACTION APICAL 4 CHAMBER: 26
EJECTION FRACTION: 23 %
EOSINOPHIL # BLD AUTO: 0.17 X10*3/UL (ref 0–0.7)
EOSINOPHIL NFR BLD AUTO: 6.3 %
ERYTHROCYTE [DISTWIDTH] IN BLOOD BY AUTOMATED COUNT: 12 % (ref 11.5–14.5)
ERYTHROCYTE [DISTWIDTH] IN BLOOD BY AUTOMATED COUNT: 12.1 % (ref 11.5–14.5)
ERYTHROCYTE [SEDIMENTATION RATE] IN BLOOD BY WESTERGREN METHOD: 32 MM/H (ref 0–20)
EST. AVERAGE GLUCOSE BLD GHB EST-MCNC: 209 MG/DL
GLUCOSE BLD MANUAL STRIP-MCNC: 145 MG/DL (ref 74–99)
GLUCOSE BLD MANUAL STRIP-MCNC: 156 MG/DL (ref 74–99)
GLUCOSE BLD MANUAL STRIP-MCNC: 82 MG/DL (ref 74–99)
GLUCOSE SERPL-MCNC: 130 MG/DL (ref 74–99)
HBA1C MFR BLD: 8.9 %
HCT VFR BLD AUTO: 41.2 % (ref 36–46)
HCT VFR BLD AUTO: 41.2 % (ref 36–46)
HGB BLD-MCNC: 13.3 G/DL (ref 12–16)
HGB BLD-MCNC: 13.6 G/DL (ref 12–16)
HGB RETIC QN: 34 PG (ref 28–38)
HOLD SPECIMEN: NORMAL
IMM GRANULOCYTES # BLD AUTO: 0 X10*3/UL (ref 0–0.7)
IMM GRANULOCYTES NFR BLD AUTO: 0 % (ref 0–0.9)
IMMATURE RETIC FRACTION: 5.7 %
INR PPP: 1.7 (ref 0.9–1.1)
LEFT ATRIUM VOLUME AREA LENGTH INDEX BSA: 23.2 ML/M2
LEFT VENTRICLE INTERNAL DIMENSION DIASTOLE: 6.35 CM (ref 3.5–6)
LEFT VENTRICULAR OUTFLOW TRACT DIAMETER: 1.94 CM
LV EJECTION FRACTION BIPLANE: 23 %
LYMPHOCYTES # BLD AUTO: 1.44 X10*3/UL (ref 1.2–4.8)
LYMPHOCYTES NFR BLD AUTO: 52.9 %
MAGNESIUM SERPL-MCNC: 1.88 MG/DL (ref 1.6–2.4)
MAGNESIUM SERPL-MCNC: 1.9 MG/DL (ref 1.6–2.4)
MCH RBC QN AUTO: 30 PG (ref 26–34)
MCH RBC QN AUTO: 30.1 PG (ref 26–34)
MCHC RBC AUTO-ENTMCNC: 32.3 G/DL (ref 32–36)
MCHC RBC AUTO-ENTMCNC: 33 G/DL (ref 32–36)
MCV RBC AUTO: 91 FL (ref 80–100)
MCV RBC AUTO: 93 FL (ref 80–100)
MITRAL VALVE E/A RATIO: 1.05
MONOCYTES # BLD AUTO: 0.44 X10*3/UL (ref 0.1–1)
MONOCYTES NFR BLD AUTO: 16.2 %
NEUTROPHILS # BLD AUTO: 0.64 X10*3/UL (ref 1.2–7.7)
NEUTROPHILS NFR BLD AUTO: 23.5 %
NRBC BLD-RTO: 0 /100 WBCS (ref 0–0)
NRBC BLD-RTO: 0 /100 WBCS (ref 0–0)
PHOSPHATE SERPL-MCNC: 3.6 MG/DL (ref 2.5–4.9)
PLATELET # BLD AUTO: 143 X10*3/UL (ref 150–450)
PLATELET # BLD AUTO: 148 X10*3/UL (ref 150–450)
POTASSIUM SERPL-SCNC: 3.8 MMOL/L (ref 3.5–5.3)
PROT SERPL-MCNC: 7 G/DL (ref 6.4–8.2)
PROTHROMBIN TIME: 19.4 SECONDS (ref 9.8–12.8)
RBC # BLD AUTO: 4.44 X10*6/UL (ref 4–5.2)
RBC # BLD AUTO: 4.52 X10*6/UL (ref 4–5.2)
RETICS #: 0.07 X10*6/UL (ref 0.02–0.08)
RETICS/RBC NFR AUTO: 1.5 % (ref 0.5–2)
RIGHT VENTRICLE FREE WALL PEAK S': 5.89 CM/S
RIGHT VENTRICLE PEAK SYSTOLIC PRESSURE: 18.4 MMHG
SODIUM SERPL-SCNC: 137 MMOL/L (ref 136–145)
TRICUSPID ANNULAR PLANE SYSTOLIC EXCURSION: 1.2 CM
UFH PPP CHRO-ACNC: 0.5 IU/ML
UFH PPP CHRO-ACNC: 0.5 IU/ML
UFH PPP CHRO-ACNC: 0.7 IU/ML
WBC # BLD AUTO: 2.5 X10*3/UL (ref 4.4–11.3)
WBC # BLD AUTO: 2.7 X10*3/UL (ref 4.4–11.3)

## 2025-02-01 PROCEDURE — 84100 ASSAY OF PHOSPHORUS: CPT

## 2025-02-01 PROCEDURE — 2500000004 HC RX 250 GENERAL PHARMACY W/ HCPCS (ALT 636 FOR OP/ED)

## 2025-02-01 PROCEDURE — 86147 CARDIOLIPIN ANTIBODY EA IG: CPT | Mod: STJLAB

## 2025-02-01 PROCEDURE — 85520 HEPARIN ASSAY: CPT

## 2025-02-01 PROCEDURE — 85610 PROTHROMBIN TIME: CPT

## 2025-02-01 PROCEDURE — C8929 TTE W OR WO FOL WCON,DOPPLER: HCPCS

## 2025-02-01 PROCEDURE — 82947 ASSAY GLUCOSE BLOOD QUANT: CPT

## 2025-02-01 PROCEDURE — 83735 ASSAY OF MAGNESIUM: CPT

## 2025-02-01 PROCEDURE — 80053 COMPREHEN METABOLIC PANEL: CPT

## 2025-02-01 PROCEDURE — 36415 COLL VENOUS BLD VENIPUNCTURE: CPT

## 2025-02-01 PROCEDURE — 93306 TTE W/DOPPLER COMPLETE: CPT | Performed by: INTERNAL MEDICINE

## 2025-02-01 PROCEDURE — 99239 HOSP IP/OBS DSCHRG MGMT >30: CPT

## 2025-02-01 PROCEDURE — 85027 COMPLETE CBC AUTOMATED: CPT

## 2025-02-01 PROCEDURE — 70450 CT HEAD/BRAIN W/O DYE: CPT | Performed by: STUDENT IN AN ORGANIZED HEALTH CARE EDUCATION/TRAINING PROGRAM

## 2025-02-01 PROCEDURE — 85613 RUSSELL VIPER VENOM DILUTED: CPT | Mod: STJLAB

## 2025-02-01 PROCEDURE — 82172 ASSAY OF APOLIPOPROTEIN: CPT

## 2025-02-01 PROCEDURE — 86146 BETA-2 GLYCOPROTEIN ANTIBODY: CPT | Mod: STJLAB

## 2025-02-01 PROCEDURE — 2500000001 HC RX 250 WO HCPCS SELF ADMINISTERED DRUGS (ALT 637 FOR MEDICARE OP)

## 2025-02-01 PROCEDURE — 99233 SBSQ HOSP IP/OBS HIGH 50: CPT | Performed by: STUDENT IN AN ORGANIZED HEALTH CARE EDUCATION/TRAINING PROGRAM

## 2025-02-01 PROCEDURE — 2500000002 HC RX 250 W HCPCS SELF ADMINISTERED DRUGS (ALT 637 FOR MEDICARE OP, ALT 636 FOR OP/ED)

## 2025-02-01 PROCEDURE — 70450 CT HEAD/BRAIN W/O DYE: CPT

## 2025-02-01 PROCEDURE — 99254 IP/OBS CNSLTJ NEW/EST MOD 60: CPT | Performed by: PSYCHIATRY & NEUROLOGY

## 2025-02-01 RX ORDER — WARFARIN 2.5 MG/1
7.5 TABLET ORAL DAILY
Status: COMPLETED | OUTPATIENT
Start: 2025-02-01 | End: 2025-02-01

## 2025-02-01 RX ORDER — HEPARIN SODIUM 10000 [USP'U]/100ML
0-4000 INJECTION, SOLUTION INTRAVENOUS CONTINUOUS
Status: DISCONTINUED | OUTPATIENT
Start: 2025-02-01 | End: 2025-02-01 | Stop reason: HOSPADM

## 2025-02-01 RX ORDER — ACETAMINOPHEN 325 MG/1
975 TABLET ORAL ONCE
Status: COMPLETED | OUTPATIENT
Start: 2025-02-01 | End: 2025-02-01

## 2025-02-01 RX ORDER — ACETAMINOPHEN 500 MG
5 TABLET ORAL NIGHTLY
Status: DISCONTINUED | OUTPATIENT
Start: 2025-02-01 | End: 2025-02-01 | Stop reason: HOSPADM

## 2025-02-01 RX ORDER — LANOLIN ALCOHOL/MO/W.PET/CERES
800 CREAM (GRAM) TOPICAL ONCE
Status: COMPLETED | OUTPATIENT
Start: 2025-02-01 | End: 2025-02-01

## 2025-02-01 RX ORDER — WARFARIN 2.5 MG/1
5 TABLET ORAL DAILY
Status: DISCONTINUED | OUTPATIENT
Start: 2025-02-02 | End: 2025-02-01 | Stop reason: HOSPADM

## 2025-02-01 RX ORDER — WARFARIN 2.5 MG/1
7.5 TABLET ORAL DAILY
Status: DISCONTINUED | OUTPATIENT
Start: 2025-02-01 | End: 2025-02-01

## 2025-02-01 RX ORDER — POTASSIUM CHLORIDE 20 MEQ/1
20 TABLET, EXTENDED RELEASE ORAL ONCE
Status: COMPLETED | OUTPATIENT
Start: 2025-02-01 | End: 2025-02-01

## 2025-02-01 RX ADMIN — CLOPIDOGREL BISULFATE 75 MG: 75 TABLET ORAL at 08:18

## 2025-02-01 RX ADMIN — POTASSIUM CHLORIDE 20 MEQ: 1500 TABLET, EXTENDED RELEASE ORAL at 06:18

## 2025-02-01 RX ADMIN — DULOXETINE HYDROCHLORIDE 30 MG: 30 CAPSULE, DELAYED RELEASE ORAL at 08:18

## 2025-02-01 RX ADMIN — ACETAMINOPHEN 975 MG: 325 TABLET ORAL at 00:42

## 2025-02-01 RX ADMIN — DULOXETINE HYDROCHLORIDE 30 MG: 30 CAPSULE, DELAYED RELEASE ORAL at 00:01

## 2025-02-01 RX ADMIN — HEPARIN SODIUM 1000 UNITS/HR: 10000 INJECTION, SOLUTION INTRAVENOUS at 00:42

## 2025-02-01 RX ADMIN — WARFARIN SODIUM 7.5 MG: 2.5 TABLET ORAL at 17:10

## 2025-02-01 RX ADMIN — SERTRALINE 100 MG: 100 TABLET, FILM COATED ORAL at 08:18

## 2025-02-01 RX ADMIN — DESIPRAMINE HYDROCHLORIDE 10 MG: 10 TABLET ORAL at 08:18

## 2025-02-01 RX ADMIN — PERFLUTREN 10 ML OF DILUTION: 6.52 INJECTION, SUSPENSION INTRAVENOUS at 11:29

## 2025-02-01 RX ADMIN — Medication 800 MG: at 04:04

## 2025-02-01 ASSESSMENT — COGNITIVE AND FUNCTIONAL STATUS - GENERAL
MOBILITY SCORE: 24
DAILY ACTIVITIY SCORE: 24

## 2025-02-01 ASSESSMENT — PAIN SCALES - GENERAL
PAINLEVEL_OUTOF10: 0 - NO PAIN

## 2025-02-01 ASSESSMENT — PAIN - FUNCTIONAL ASSESSMENT
PAIN_FUNCTIONAL_ASSESSMENT: 0-10

## 2025-02-01 NOTE — PROGRESS NOTES
Critical Care Daily Progress        Subjective   Patient is a 44 y.o. female admitted on 1/31/2025  9:22 PM with the following indication(s) for ICU care q 1 hour neurochecks.     Overnight Events: none, remained neurologically intact with NIH 0 and vitally stable, started on heparin drip.    Complaints: has none..     Scheduled Medications:   clopidogrel, 75 mg, oral, Daily  [Held by provider] dapagliflozin propanediol, 10 mg, oral, Daily  desipramine, 10 mg, oral, Daily  DULoxetine, 30 mg, oral, BID  [Held by provider] gabapentin, 200 mg, oral, BID  insulin lispro, 0-10 Units, subcutaneous, q4h  metoprolol succinate XL, 200 mg, oral, Daily  perflutren lipid microspheres, 0.5-10 mL of dilution, intravenous, Once in imaging  perflutren protein A microsphere, 0.5 mL, intravenous, Once in imaging  perflutren protein A microsphere, 0.5 mL, intravenous, Once in imaging  rosuvastatin, 40 mg, oral, Nightly  sacubitriL-valsartan, 1 tablet, oral, BID  sertraline, 100 mg, oral, Daily  sulfur hexafluoride microsphr, 2 mL, intravenous, Once in imaging  sulfur hexafluoride microsphr, 2 mL, intravenous, Once in imaging  [START ON 2/2/2025] warfarin, 5 mg, oral, Daily  warfarin, 7.5 mg, oral, Daily     Continuous Medications:   heparin, 0-4,000 Units/hr, Last Rate: 800 Units/hr (02/01/25 1207)    PRN Medications:   PRN medications: dextrose, dextrose, dextrose, dextrose, [Held by provider] furosemide, glucagon, glucagon, glucagon, glucagon, heparin, hydrALAZINE **FOLLOWED BY** [START ON 2/2/2025] hydrALAZINE, labetaloL, labetaloL, oxygen, polyethylene glycol    Objective   Vitals:  Temp  Min: 36.1 °C (97 °F)  Max: 36.8 °C (98.2 °F)  Pulse  Min: 76  Max: 116  BP  Min: 92/68  Max: 111/78  Resp  Min: 11  Max: 52  SpO2  Min: 84 %  Max: 100 %    Physical Exam:   Physical Exam   Physical Exam  Constitutional:       General: She is not in acute distress.     Appearance: Normal appearance.   HENT:      Head: Normocephalic and atraumatic.       Mouth/Throat:      Mouth: Mucous membranes are moist.   Eyes:      Extraocular Movements: Extraocular movements intact.      Conjunctiva/sclera: Conjunctivae normal.   Cardiovascular:      Rate and Rhythm: Normal rate and regular rhythm.      Pulses: Normal pulses.      Heart sounds: No murmur heard.  Pulmonary:      Effort: Pulmonary effort is normal. No respiratory distress.      Breath sounds: Normal breath sounds.   Abdominal:      General: Abdomen is flat. There is no distension.      Palpations: Abdomen is soft.      Tenderness: There is no abdominal tenderness.   Musculoskeletal:      Right lower leg: No edema.      Left lower leg: No edema.   Skin:     General: Skin is warm and dry.   Neurological:      General: No focal deficit present.      Mental Status: She is alert and oriented to person, place, and time.      Cranial Nerves: No cranial nerve deficit.      Sensory: No sensory deficit.      Motor: No weakness.   Psychiatric:         Mood and Affect: Mood normal.         Behavior: Behavior normal.         Assessment/Plan   Overall Assessment:  This is a 44 years old female with past medical history of chronic paroxysmal A-fib on warfarin, pulmonary embolism (prior notes say 2016 but no definitive evidence), STEMI s/p PCI of the distal left main and proximal LAD with mechanical thrombectomy (6/2/19), chronic systolic heart failure LVEF 25-30%, cardiomyopathy, ventricular tachycardia s/p Medtronic ICD implant (9/23/19), left ventricular thrombus 9/2022 (Coumadin), HLD, GERD, SLE, pericarditis, sleep apnea, presents as a transfer from [facility] who came in for transient expressive aphasia at 10:30 AM 1/31/25, which lasted for about two minutes. Following its resolution, she developed right-sided facial numbness and weakness in her right upper extremity, which she reports as weaker than the left. A CT brain scan was negative for acute bleed, and a CT angiogram revealed a right MCA large vessel occlusion  that did not correlate with her symptoms. Upon arrival to ICU at [place], she is alert and oriented, with no neurological deficits (NIHSS score of 0) and remains hemodynamically stable.  She was started on a heparin drip.  Neurology saw the patient, diagnosed TIA, note that the right M2 occlusion was incidentally found related to her transient symptoms.  Recommend repeat head CT.    The patient was previously on Eliquis for a short period of time years ago, unclear why this was transitioned to coumadin and never changed back. She states her cardiologist wanted to watch her INR closely, denies that it was the cost of eliquis that made her switch to coumadin    Neuro:   #TIA  #M2 inferior right MCA occlusion   - CTA showed a large vessel occlusion in the mid-M2 inferior division of the right MCA   - CT head today again, follow up results  - TTE bubble study pending  - She only missed one dose of coumadin last night when she was admitted. Her last coumadin clinic appointment was 1/11/25 and she was supratherapuetic at 3.3  ER visit on 1/13/25 INR was 1.9  -Discussed with pharmacy and given that she is only missed 1 dose, last dose of Coumadin was 1/30/2025 the patient does not require bridging with heparin or Lovenox can just resume Coumadin.  - 7.5mg coumadin tonight and then 5mg daily thereafter  - Trend INR daily until therapeutic 2-3  - Home meds: plavix continued  - BP goals: SBP >100 and <220  - CAM ICU    Cardiovascular:   History: chronic A-fib on warfarin, STEMI s/p PCI of the distal left main and proximal LAD with mechanical thrombectomy (6/2/19), HFrEF LVEF 25-30%, cardiomyopathy, ventricular tachycardia s/p Medtronic ICD implant (9/23/19), left ventricular thrombus 9/2022, HLD, pericarditis  -Home meds: resume GDMT per her home regimen today, timed and with hold parameters  - Resume Entresto and metoprolol, hold for SBP less than 100 and HR less than 60  -Continuous cardiac monitoring  -Monitor  hemodynamics  -Maintain Goal MAP > 65  -TTE w/ bubble study, pending follow-up  -Last echo 4/2024 EF 25-30%, impaired diastolic filling, global left ventricular hypokinesis  -Monitor and optimize electrolytes, keep K > 4.0, Mag > 2.0  -Tried to elucidate from patient interview and chart review the reason why she is on Coumadin instead of Eliquis and was unable to get a satisfactory answer, she does not recall the exact reason for transitioning to Coumadin instead of Eliquis but thinks it may be due to a bit of clot left behind after her first MI. She also comments that her cardiologist wanted to follow her INR closely.  She denies that it was due to the cost of Eliquis.  The patient will need to follow-up with vascular medicine as she has not seen Dr. Willard in over 3 years.  She saw the heart failure clinic Dr. Rodriguez once in May 2024 recommended a metabolic stress test to establish a baseline.  She has not been able to complete that yet    Pulmonary:   No acute issues, on room air  -History: pulmonary Embolism (2016 on OAC Eliquis for short period and currently on Coumadin for A-fib)  -Imaging: Chest x-ray was clear for any acute cardiopulmonary process  -Continuous pulse oximetry   -OOB to chair    Gastrointestinal:   Results from last 7 days   Lab Units 02/01/25  0441 01/31/25  1334 01/31/25  1207   INR  1.7* 1.6*  --    APTT seconds 77* 24.3  --    ALK PHOS U/L 39  --  44   AST U/L 14  --  19   ALT U/L 12  --  11   No acute issues  -History: GERD  -Diet: Carb consistent diet  -Supplementation: None  -Prophylaxis: None  -Bowel regimen: MiraLAX as needed  - Last BM: Last BM Date: 01/30/25    Renal:  Results from last 7 days   Lab Units 02/01/25  0441 01/31/25  2206 01/31/25  1207   SODIUM mmol/L 137  --  132*   POTASSIUM mmol/L 3.8  --  4.3   MAGNESIUM mg/dL 1.90 1.88  --    PHOSPHORUS mg/dL 3.6  --   --    BUN mg/dL 10  --  8   CREATININE mg/dL 0.77  --  0.59   Net IO Since Admission: 33 mL [02/01/25  1423]  No active issues  - History: None  - Home meds: None  - Monitor I's and O's, no need for Martinez  - Electrolytes: Replete per protocol, goal K>4 Phos >3 Mg >2    Endocrine:   Results from last 7 days   Lab Units 25  1130 25  0817 25  1207   POCT GLUCOSE mg/dL 156* 145*  --  131*  --   --    GLUCOSE mg/dL  --   --  130*  --   --  125*   TRIGLYCERIDES mg/dL  --   --   --   --  190*  --    No active issues  -Repeat A1c on admission is 8.9%  -History: Type 2 diabetes  -Home meds: Trulicity (H)  -Hypoglycemia protocol in place  -Sliding scale insulin #2  -BG < 180 goal  -Recommend she follow-up with her endocrinologist or family medicine provider in the outpatient setting for further titration of her diabetic regimen    Hematology:  Results from last 7 days   Lab Units 25  1207   HEMOGLOBIN g/dL 13.3 13.6 14.2   HEMATOCRIT % 41.2 41.2 41.6   PLATELETS AUTO x10*3/uL 143* 148* 163   #Thrombocytopenia  #Leukopenia  -Platelets have been mildly downtrending since yesterday, we will continue to monitor while she is on heparin  -There are no signs of bleeding or bruising at this time  -Daily CBC  -History: None  -Home meds: Coumadin, Plavix  - DVT Prophylaxis: Heparin and Coumadin and SCDs    Infectious Disease:   Results from last 7 days   Lab Units 25  1207   WBC AUTO x10*3/uL 2.5* 2.7*  --  2.4*   SED RATE mm/h  --  32*  --   --    CRP mg/dL  --   --  <0.10  --    Temp (24hrs), Av.4 °C (97.6 °F), Min:36.1 °C (97 °F), Max:36.8 °C (98.2 °F)  #Leukopenia with neutropenia  -It seems that the patient WBC is around 3.5-5 at baseline, however during prior hospital admissions it has down trended to 2.7 frequently  -We will continue to monitor for signs of infection including fever chills cough shortness of breath etc.  -History: None  -Home meds: None  -Cultures: Not  indicated  -Abx: Not indicated    Rheumatology/Musculoskeletal:  -History of SLE, patient reports she has been in remission and was discharged by her rheumatologist several years ago.  She states she has not been on any medication or had a flare in quite some time  -Last note available to view in the chart is from June 2020 with Dr. Dempsey which stated to continue hydroxychloroquine 400 and follow-up in 6 months    LDA: PIV       CODE STATUS: Full Code    Disposition: Stable for downgrade vs discharge    Case discussed with attending, Dr. Zelaya.    Snehal Zavaleta, DO  Family Medicine - PGY-1

## 2025-02-01 NOTE — PROGRESS NOTES
Physical Therapy                 Therapy Communication Note    Patient Name: Jayde Daugherty  MRN: [id number]  Department: [facility] ICU  Room: 2121/2121-A  Today's Date: 2/1/2025     Discipline: Physical Therapy    PT Missed Visit: Yes     Missed Time: Attempt    Comment: PT order received, chart reviewed, functional screen performed.  Pt is independent with functional mobility and has no skilled PT needs at this time.  Discharge PT.

## 2025-02-01 NOTE — PROGRESS NOTES
"Jayde Daugherty is a 44 y.o. female on day 1 of admission presenting with Acute ischemic right MCA stroke (Multi).    Subjective   Transferred from [facility] [place] overnight for closer neurological monitoring  Presented with transient afib, lasted about 2 minutes followed by R face, numbness, weakness of the R arm. She had no L sided weakness. CTA revealed R M2 occlusion, likely unrelated.     Of note, she is unable to get an MR at [facility] due to her pacemaker, plan to repeat CTH 24 hours from initial    INR on presentation was 1.6 and review of recent INRs reveal multiple subtherapeutic levels     TTE done 4/17/2024 showed LVEF 25-30%     Feels back to her baseline today. No further episodes of difficulty speaking, no vision changes, unilateral weakness, numbness or incoordination     She was previously on Eliquis but her outpatient team switched her to warfarin for unclear reasons. She denies a history of known coagulopathy such as antiphospholipid antibody syndrome        Objective     Last Recorded Vitals  Blood pressure 93/58, pulse 76, temperature 36.3 °C (97.3 °F), temperature source Temporal, resp. rate 16, height 1.626 m (5' 4\"), weight 85.2 kg (187 lb 13.3 oz), SpO2 97%.    Physical Exam  Eyes:      Extraocular Movements: Extraocular movements intact.   Neurological:      Motor: Motor strength is normal.  Psychiatric:         Speech: Speech normal.       Neurological Exam  Mental Status  Awake, alert and oriented to person, place and time. Speech is normal.    Cranial Nerves  CN II: Visual fields full to confrontation.  CN III, IV, VI: Extraocular movements intact bilaterally.  CN V: Facial sensation is normal.  CN VII: Full and symmetric facial movement.  CN VIII: Hearing is normal.  CN IX, X: Palate elevates symmetrically  CN XI: Shoulder shrug strength is normal.  CN XII: Tongue midline without atrophy or fasciculations.    Motor   Strength is 5/5 throughout all four extremities.    Sensory  Light touch " is normal in upper and lower extremities.     Coordination  Right: Finger-to-nose normal.Left: Finger-to-nose normal.    Gait  Casual gait is normal including stance, stride, and arm swing.    Relevant Results    NIH Stroke Scale  1A. Level of Consciousness: Alert, Keenly Responsive  1B. Ask Month and Age: Both Questions Right  1C. Blink Eyes & Squeeze Hands: Performs Both Tasks  2. Best Gaze: Normal  3. Visual: No Visual Loss  4. Facial Palsy: Normal Symmetrical Movements  5A. Motor - Left Arm: No Drift  5B. Motor - Right Arm: No Drift  6A. Motor - Left Leg: No Drift  6B. Motor - Right Leg: No Drift  7. Limb Ataxia: Absent  8. Sensory Loss: Normal  9. Best Language: No Aphasia  10. Dysarthria: Normal  11. Extinction and Inattention: No Abnormality  NIH Stroke Scale: 0           Chuck Coma Scale  Best Eye Response: Spontaneous  Best Verbal Response: Oriented  Best Motor Response: Follows commands  Chuck Coma Scale Score: 15                                  Assessment/Plan   This patient currently has cardiac telemetry ordered; if you would like to modify or discontinue the telemetry order, click here to go to the orders activity to modify/discontinue the order.  Assessment & Plan  Acute ischemic right MCA stroke (Multi)    TIA - deficits resolved, neurologically stable. No residual deficits  R M2 occlusion - incidentally found and unlikely related to her transient symptoms based on history. Will treat medically with antiplatelet and statin     Unable to get MRI due to ICD. Plan to repeat CTH today in place of MRI    Will need to clarify why she was switched to warfarin as she has had multiple subtherapeutic INRs so may want to transition back to DOAC     TTE with bubble study pending     Since she has remained stable, ok to reduce neuro checks to q4h     If CTH stable and no acute findings on TTE, then ok to discharge home when medically ready         Keira Hummel MD

## 2025-02-01 NOTE — CONSULTS
Reason for consult:  Depression    History Of Present Illness  Jayde Daugherty is a 44 y.o. female presenting with strokelike symptoms.  Patient lives with her 2 daughters 23 and 10 years of age and she is currently employed.  Patient has been dealing with depression for the past many years and over the last 5 years she has been taking medications Cymbalta 60 mg and Zoloft 100 mg.  She is also on desipramine for sleep.  Patient does not have any side effects from this combination of medication.  In spite of taking all this medication patient rated her mood to be 7 out of 10 with 10 being bad.  She attributes her depression to work-related stress.  She does not have any other stressors.  Does not have any hopeless helpless or worthless feeling.  She feels tired and lethargic and have poor motivation.  Has trouble sleeping and her appetite is good.  Patient has mild anxiety symptoms but no panic attacks.  No audiovisual hallucinations or paranoid thoughts.    Patient has no significant past psychiatric admission or suicidal attempt  Currently seeing a primary care doctor who has been treating her depression for many years    Past Medical History  She has a past medical history of Myocardial infarction (Multi), Personal history of nicotine dependence (06/10/2019), Personal history of other diseases of the circulatory system (2014), Personal history of other diseases of the respiratory system (2022), and Systemic lupus erythematosus, unspecified (Multi) (2013).    Surgical History  She has a past surgical history that includes Other surgical history (2019); Hysterectomy (2014); Cervical biopsy w/ loop electrode excision ();  section, low transverse (); Total hip arthroplasty (Bilateral, 2014); MR angio head wo IV contrast (2013); and MR angio neck wo IV contrast (2013).     Social History  She reports that she quit smoking about 9 years ago. Her  "smoking use included cigarettes. She started smoking about 34 years ago. She has a 25 pack-year smoking history. She has never used smokeless tobacco. She reports current alcohol use of about 3.0 standard drinks of alcohol per week. She reports that she does not currently use drugs.     Allergies  Hay fever and allergy relief        Mental Status Exam  Alert and oriented x 3 cooperative maintain eye contact normal psychomotor activity.  Speech is normal in rate and rhythm coherent.  Patient described her mood to be anxious and depressed and affect is reactive.  She does not have any formal thought disorder.  No delusions or hallucinations.  Judgment and insight is intact.    Psychiatric Risk Assessment  Low suicide risk    Last Recorded Vitals  Blood pressure 104/72, pulse 88, temperature 36.3 °C (97.3 °F), resp. rate 24, height 1.626 m (5' 4\"), weight 85.2 kg (187 lb 13.3 oz), SpO2 99%.    Relevant Results  Results for orders placed or performed during the hospital encounter of 01/31/25 (from the past 96 hours)   B-Type Natriuretic Peptide   Result Value Ref Range     (H) 0 - 99 pg/mL   C3 Complement   Result Value Ref Range    C3 Complement 146 87 - 200 mg/dL   C4 Complement   Result Value Ref Range    C4 Complement 31 10 - 50 mg/dL   Anti-DNA Antibody, Double-Stranded   Result Value Ref Range    Anti-DNA (DS) 1.0 <5.0 IU/mL   Lipid Panel   Result Value Ref Range    Cholesterol 231 (H) 0 - 199 mg/dL    HDL-Cholesterol 45.5 mg/dL    Cholesterol/HDL Ratio 5.1     LDL Calculated 148 (H) <=99 mg/dL    VLDL 38 0 - 40 mg/dL    Triglycerides 190 (H) 0 - 149 mg/dL    Non HDL Cholesterol 186 (H) 0 - 149 mg/dL   C-reactive protein   Result Value Ref Range    C-Reactive Protein <0.10 <1.00 mg/dL   Magnesium   Result Value Ref Range    Magnesium 1.88 1.60 - 2.40 mg/dL   POCT GLUCOSE   Result Value Ref Range    POCT Glucose 131 (H) 74 - 99 mg/dL   Hemoglobin A1C   Result Value Ref Range    Hemoglobin A1C 8.9 (H) See " comment %    Estimated Average Glucose 209 Not Established mg/dL   CBC and Auto Differential   Result Value Ref Range    WBC 2.7 (L) 4.4 - 11.3 x10*3/uL    nRBC 0.0 0.0 - 0.0 /100 WBCs    RBC 4.52 4.00 - 5.20 x10*6/uL    Hemoglobin 13.6 12.0 - 16.0 g/dL    Hematocrit 41.2 36.0 - 46.0 %    MCV 91 80 - 100 fL    MCH 30.1 26.0 - 34.0 pg    MCHC 33.0 32.0 - 36.0 g/dL    RDW 12.0 11.5 - 14.5 %    Platelets 148 (L) 150 - 450 x10*3/uL    Neutrophils % 23.5 40.0 - 80.0 %    Immature Granulocytes %, Automated 0.0 0.0 - 0.9 %    Lymphocytes % 52.9 13.0 - 44.0 %    Monocytes % 16.2 2.0 - 10.0 %    Eosinophils % 6.3 0.0 - 6.0 %    Basophils % 1.1 0.0 - 2.0 %    Neutrophils Absolute 0.64 (L) 1.20 - 7.70 x10*3/uL    Immature Granulocytes Absolute, Automated 0.00 0.00 - 0.70 x10*3/uL    Lymphocytes Absolute 1.44 1.20 - 4.80 x10*3/uL    Monocytes Absolute 0.44 0.10 - 1.00 x10*3/uL    Eosinophils Absolute 0.17 0.00 - 0.70 x10*3/uL    Basophils Absolute 0.03 0.00 - 0.10 x10*3/uL   Sedimentation Rate   Result Value Ref Range    Sedimentation Rate 32 (H) 0 - 20 mm/h   Reticulocytes   Result Value Ref Range    Retic % 1.5 0.5 - 2.0 %    Retic Absolute 0.069 0.018 - 0.083 x10*6/uL    Reticulocyte Hemoglobin 34 28 - 38 pg    Immature Retic fraction 5.7 <=16.0 %   Anti-Cardiolipin Antibody (IgA, IgG, and IgM)   Result Value Ref Range    Anticardiolipin IgA 5.4 <20.0 APL U/mL    Anticardiolipin IgG <1.6 <20.0 GPL U/mL    Anticardiolipin IgM 0.6 <20.0 MPL U/mL   Beta-2 Glycoprotein Antibodies   Result Value Ref Range    Beta-2 Glyco 1 IgG <1.4 <20.0 U/mL    Beta-2 Glyco 1 IgA 4.1 <20.0 U/mL    Beta 2 Glyco 1 IgM 0.3 <20.0 U/mL   Magnesium   Result Value Ref Range    Magnesium 1.90 1.60 - 2.40 mg/dL   CBC   Result Value Ref Range    WBC 2.5 (L) 4.4 - 11.3 x10*3/uL    nRBC 0.0 0.0 - 0.0 /100 WBCs    RBC 4.44 4.00 - 5.20 x10*6/uL    Hemoglobin 13.3 12.0 - 16.0 g/dL    Hematocrit 41.2 36.0 - 46.0 %    MCV 93 80 - 100 fL    MCH 30.0 26.0 - 34.0  pg    MCHC 32.3 32.0 - 36.0 g/dL    RDW 12.1 11.5 - 14.5 %    Platelets 143 (L) 150 - 450 x10*3/uL   Comprehensive Metabolic Panel   Result Value Ref Range    Glucose 130 (H) 74 - 99 mg/dL    Sodium 137 136 - 145 mmol/L    Potassium 3.8 3.5 - 5.3 mmol/L    Chloride 102 98 - 107 mmol/L    Bicarbonate 28 21 - 32 mmol/L    Anion Gap 11 10 - 20 mmol/L    Urea Nitrogen 10 6 - 23 mg/dL    Creatinine 0.77 0.50 - 1.05 mg/dL    eGFR >90 >60 mL/min/1.73m*2    Calcium 8.9 8.6 - 10.3 mg/dL    Albumin 3.6 3.4 - 5.0 g/dL    Alkaline Phosphatase 39 33 - 110 U/L    Total Protein 7.0 6.4 - 8.2 g/dL    AST 14 9 - 39 U/L    Bilirubin, Total 0.4 0.0 - 1.2 mg/dL    ALT 12 7 - 45 U/L   Phosphorus   Result Value Ref Range    Phosphorus 3.6 2.5 - 4.9 mg/dL   Heparin Assay, UFH   Result Value Ref Range    Heparin Unfractionated 0.5 See Comment Below for Therapeutic Ranges IU/mL   Coagulation Screen   Result Value Ref Range    Protime 19.4 (H) 9.8 - 12.8 seconds    INR 1.7 (H) 0.9 - 1.1    aPTT 77 (H) 27 - 38 seconds   POCT GLUCOSE   Result Value Ref Range    POCT Glucose 145 (H) 74 - 99 mg/dL   Transthoracic Echo (TTE) Complete   Result Value Ref Range    LV Biplane EF 23 %    LVOT diam 1.94 cm    MV E/A ratio 1.05     Tricuspid annular plane systolic excursion 1.2 cm    LA vol index A/L 23.2 ml/m2    LV EF 23 %    RV free wall pk S' 5.89 cm/s    RVSP 18.4 mmHg    LVIDd 6.35 cm    LV A4C EF 26.0    POCT GLUCOSE   Result Value Ref Range    POCT Glucose 156 (H) 74 - 99 mg/dL   Heparin Assay, UFH   Result Value Ref Range    Heparin Unfractionated 0.7 See Comment Below for Therapeutic Ranges IU/mL   POCT GLUCOSE   Result Value Ref Range    POCT Glucose 82 74 - 99 mg/dL   Heparin Assay, UFH   Result Value Ref Range    Heparin Unfractionated 0.5 See Comment Below for Therapeutic Ranges IU/mL     *Note: Due to a large number of results and/or encounters for the requested time period, some results have not been displayed. A complete set of results  can be found in Results Review.     CT head wo IV contrast    Result Date: 2/1/2025  Interpreted By:  Alexander Wheeler, STUDY: CT HEAD WO IV CONTRAST;  2/1/2025 12:53 pm   INDICATION: Signs/Symptoms:Status post dysarthria and large inferior branch of the right MCA occlusion.  Unable to obtain MRI due to AICD placement.     COMPARISON: CTA head/neck 01/31/2025, CT head without contrast 01/31/2025   ACCESSION NUMBER(S): [id number]   ORDERING CLINICIAN: LUDY MADRID   TECHNIQUE: Noncontrast axial CT images of head were obtained with coronal and sagittal reconstructed images.   FINDINGS: BRAIN PARENCHYMA:  No acute intraparenchymal hemorrhage or parenchymal evidence of acute large territory ischemic infarct. Gray-white matter distinction is preserved. No mass-effect.   VENTRICLES and EXTRA-AXIAL SPACES:  No acute extra-axial or intraventricular hemorrhage. No effacement of cerebral sulci. The ventricles and sulci are age-concordant.   PARANASAL SINUSES/MASTOIDS:  No hemorrhage or air-fluid levels within the visualized paranasal sinuses. The mastoids are well aerated.   CALVARIUM/ORBITS:  No skull fracture.  The orbits and globes are intact to the extent visualized.   EXTRACRANIAL SOFT TISSUES: No discernible abnormality.       No discernible loss of gray-white differentiation in the region of the occluded right MCA branch. No acute intracranial hemorrhage or mass effect.     MACRO: None.   Signed by: Alexander Wheeler 2/1/2025 3:06 PM Dictation workstation:   [id number]    Transthoracic Echo (TTE) Complete    Result Date: 2/1/2025            [facility] [place] [address] [facility] [id number]    Tel [phone] Fax [phone] TRANSTHORACIC ECHOCARDIOGRAM REPORT Patient Name:       YOBANI CHRISTI Laughlin Physician:    [id number] Brett Armando MD Study Date:         2/1/2025             Ordering Provider:    Kory BUENROSTRO                                                                 [facility] MRN/PID:            [id number]             Fellow: Accession#:         [id number]         Nurse: Date of Birth/Age:  1980 / 44 years  Sonographer:          Shauna Williamson RDCS Gender Assigned at  F                    Additional Staff: Birth: Height:             162.56 cm            Admit Date:           1/31/2025 Weight:             84.82 kg             Admission Status:     Inpatient -                                                                Routine BSA / BMI:          1.90 m2 / 32.10      Department Location:  [facility] ICU Front                     kg/m2                                      (19-26) Blood Pressure: 98 /56 mmHg Study Type:    TRANSTHORACIC ECHO (TTE) COMPLETE Diagnosis/ICD: Cerebral infarction due to unspecified occlusion or stenosis of                left middle cerebral artery-I63.512 Indication:    Stroke CPT Codes:     Echo Complete w Full [id number] Patient History: Pertinent History: A-Fib, CHF, Hyperlipidemia, CVA and Dyspnea. Previous                    echocardiogram 04-. Study Detail: The following Echo studies were performed: 2D, M-Mode, Doppler and               color flow. Technically challenging study due to body habitus.               Definity used as a contrast agent for endocardial border               definition and agitated saline used as a contrast agent for               intraseptal flow evaluation. Total contrast used for this               procedure was 2 mL via IV push.  PHYSICIAN INTERPRETATION: Left Ventricle: Left ventricular ejection fraction is severely decreased, calculated by Lopez's biplane at 23%. There is severe left ventricular hypertrophy. There is global hypokinesis of the left ventricle with minor regional variations. The left ventricular cavity size is mildly dilated. Spectral Doppler shows a Grade I (impaired relaxation pattern) of left ventricular diastolic filling with normal left atrial  filling pressure. Left Atrium: The left atrial size is normal. A bubble study using agitated saline was performed. Bubble study is negative. Right Ventricle: The right ventricle is normal in size. There is normal right ventricular global systolic function. A device is visualized in the right ventricle. Right Atrium: The right atrial size is normal. There is a device visualized in the right atrium. Aortic Valve: The aortic valve is trileaflet. There is no evidence of aortic valve regurgitation. Mitral Valve: The mitral valve is normal in structure. The peak instantaneous gradient of the mitral valve is 1 mmHg. There is mild to moderate mitral valve regurgitation. Tricuspid Valve: The tricuspid valve is structurally normal. There is mild tricuspid regurgitation. The Doppler estimated RVSP is within normal limits at 18.4 mmHg. Pulmonic Valve: The pulmonic valve is structurally normal. There is mild pulmonic valve regurgitation. Pericardium: No pericardial effusion noted. Aorta: The aortic root is normal.  CONCLUSIONS:  1. Left ventricular ejection fraction is severely decreased, calculated by Lopez's biplane at 23%.  2. There is global hypokinesis of the left ventricle with minor regional variations.  3. Spectral Doppler shows a Grade I (impaired relaxation pattern) of left ventricular diastolic filling with normal left atrial filling pressure.  4. Left ventricular cavity size is mildly dilated.  5. There is normal right ventricular global systolic function.  6. Mild to moderate mitral valve regurgitation.  7. Right ventricular systolic pressure is within normal limits. QUANTITATIVE DATA SUMMARY:  2D MEASUREMENTS:             Normal Ranges: LAs:             4.59 cm     (2.7-4.0cm) IVSd:            1.06 cm     (0.6-1.1cm) LVPWd:           1.10 cm     (0.6-1.1cm) LVIDd:           6.35 cm     (3.9-5.9cm) LVIDs:           5.48 cm LV Mass Index:   157.3 g/m2 LVEDV Index:     88.00 ml/m2 LV % FS          13.7 %  LEFT  ATRIUM:                  Normal Ranges: LA Vol A4C:        37.8 ml    (22+/-6mL/m2) LA Vol A2C:        50.5 ml LA Vol BP:         44.1 ml LA Vol Index A4C:  19.9 ml/m2 LA Vol Index A2C:  26.5 ml/m2 LA Vol Index BP:   23.2 ml/m2 LA Area A4C:       15.2 cm2 LA Area A2C:       17.4 cm2 LA Major Axis A4C: 5.2 cm LA Major Axis A2C: 5.1 cm LA Volume Index:   23.5 ml/m2 LA Vol A4C:        35.8 ml LA Vol A2C:        48.7 ml LA Vol Index BSA:  22.2 ml/m2  AORTA MEASUREMENTS:         Normal Ranges: Ao Sinus, d:        3.20 cm (2.1-3.5cm) Ao STJ, d:          2.70 cm (1.7-3.4cm) Asc Ao, d:          2.80 cm (2.1-3.4cm)  LV SYSTOLIC FUNCTION:                      Normal Ranges: EF-A4C View:    26 % (>=55%) EF-A2C View:    22 % EF-Biplane:     23 % LV EF Reported: 23 %  LV DIASTOLIC FUNCTION:            Normal Ranges: MV Peak E:             0.49 m/s   (0.7-1.2 m/s) MV Peak A:             0.46 m/s   (0.42-0.7 m/s) E/A Ratio:             1.05       (1.0-2.2) MV e'                  0.032 m/s  (>8.0) MV lateral e'          0.03 m/s MV medial e'           0.04 m/s E/e' Ratio:            15.08      (<8.0) PulmV Sys Roger:         21.24 cm/s  MITRAL VALVE:          Normal Ranges: MV Vmax:      0.61 m/s (<=1.3m/s) MV peak P.5 mmHg (<5mmHg) MV mean P.9 mmHg (<48mmHg) MV VTI:       15.86 cm (10-13cm) MV DT:        175 msec (150-240msec)  AORTIC VALVE:          Normal Ranges: LVOT Diameter: 1.94 cm (1.8-2.4cm)  RIGHT VENTRICLE: RV Basal 3.35 cm RV Mid   2.10 cm RV Major 7.1 cm TAPSE:   11.9 mm RV s'    0.06 m/s  TRICUSPID VALVE/RVSP:          Normal Ranges: Peak TR Velocity:     1.96 m/s RV Syst Pressure:     18 mmHg  (< 30mmHg)  PULMONIC VALVE:          Normal Ranges: PV Accel Time:  100 msec (>120ms) PV Max Roger:     0.5 m/s  (0.6-0.9m/s) PV Max P.1 mmHg  PULMONARY VEINS: PulmV Sys Roger: 21.24 cm/s  AORTA: Asc Ao Diam 2.80 cm  [id number] Brett Armando MD Electronically signed on 2025 at 3:00:36 PM  ** Final **     XR chest 1  view    Result Date: 1/31/2025  Interpreted By:  Richy Mccann, STUDY: XR CHEST 1 VIEW;  1/31/2025 1:03 pm   INDICATION: Signs/Symptoms:Brain attack.     COMPARISON: 01/13/2025   ACCESSION NUMBER(S): [id number]   ORDERING CLINICIAN: ANDIE MONTES   FINDINGS:   Left-sided pacemaker in place.   CARDIOMEDIASTINAL SILHOUETTE: Cardiomediastinal silhouette is normal in size and configuration.   LUNGS: Lungs are clear.   ABDOMEN: No remarkable upper abdominal findings.   BONES: No acute osseous changes.       1.  No evidence of acute cardiopulmonary process.       MACRO: None   Signed by: Richy Mccann 1/31/2025 1:26 PM Dictation workstation:   [id number]    CT brain attack angio head and neck W and WO IV contrast    Result Date: 1/31/2025  Interpreted By:  Ignacio Ragland, STUDY: CT BRAIN ATTACK ANGIO HEAD AND NECK W AND WO IV CONTRAST   INDICATION: Signs/Symptoms:Brain attack, right facial and right arm numbness, right upper extremity weakness   COMPARISON: Head CT performed concurrently.   ACCESSION NUMBER(S): [id number]   ORDERING CLINICIAN: JAMIL BARAKAT   TECHNIQUE: CTA of the head and neck was performed after the intravenous administration of 75 mL Omnipaque 350 nonionic IV contrast. 3-D reconstructions were performed under physician supervision on a separate workstation and reviewed.   FINDINGS: CTA NECK:   AORTIC ARCH: The visualized portion of the aortic arch is unremarkable in appearance. The origins of the great vessels and the vertebral arteries are normal without evidence of stenosis.   CERVICAL CAROTID ARTERIES: The common carotid arteries are patent bilaterally without evidence of stenosis. There is no narrowing of the cervical internal carotid arteries bilaterally.   VERTEBRAL ARTERIES: The vertebral arteries are patent bilaterally throughout their course.   CTA HEAD:   INTERNAL CAROTID ARTERIES: Normal in course and caliber.   CEREBRAL ARTERIES: Large vessel occlusion of a mid M2 inferior division  branch of the right MCA within the right sylvian fissure (series 6, image 43; series 5, image 949). Distal M3/M4 branches of the inferior division of the right MCA appear grossly symmetric compared to the left. Poor visualization of the M2 superior division branches of the right MCA without definite focal occlusion visualized. Normal appearance of the left MCA. Bilateral ACAs and PCAs are unremarkable.   VERTEBROBASILAR SYSTEM: Normal in course and caliber.   There is no evidence for saccular aneurysm or vascular malformation.       Large vessel occlusion of the mid M2 inferior division branch of the right MCA with symmetric appearance of bilateral distal M3/M4 branches of the inferior divisions of the MCAs.   Poor visualization of the M2 superior division branches of the right MCA without focal definite occlusion appreciated.   Remaining intracranial vasculature is within normal limits.   Cervical vasculature is unremarkable.   Critical Finding:  See findings. Notification was initiated on 1/31/2025 at 12:28 pm by  Ignacio Ragland.  (**-OCF-**)   _____________ NASCET criteria for internal carotid artery stenosis: Mild: 0% to 49% Moderate: 50% to 69% Severe: 70% to 99% Complete Occlusion   Signed by: Ignacio Ragland 1/31/2025 12:29 PM Dictation workstation:   [id number]    ECG 12 lead    Result Date: 1/31/2025   Poor data quality, interpretation may be adversely affected Sinus rhythm with occasional Premature ventricular complexes Left axis deviation Anterolateral infarct (cited on or before 22-FEB-2023) Abnormal ECG When compared with ECG of 13-JAN-2025 10:11, Premature ventricular complexes are now Present Nonspecific T wave abnormality no longer evident in Inferior leads    CT brain attack head wo IV contrast    Result Date: 1/31/2025  Interpreted By:  Alexander Flowers, STUDY: CT BRAIN ATTACK HEAD WO IV CONTRAST;  1/31/2025 11:38 am   INDICATION: Signs/Symptoms:Stroke Evaluation.   COMPARISON: Prior exam from  11/18/2020.   ACCESSION NUMBER(S): [id number]   ORDERING CLINICIAN: JAMIL BAARKAT   TECHNIQUE: Routine axial images were obtained from the skull base through the vertex. Sagittal and coronal reconstruction images were generated. Brain, subdural, and bone windows were reviewed. N/A   N/A   FINDINGS: INTRACRANIAL: The ventricles, sulci and basal cisterns were within normal limits. There are bilateral basal ganglia physiologic calcifications. No acute intracranial bleed, midline shift, or focal mass effect. No destructive bone lesion. No depressed skull fracture. Faint skull base arterial calcifications in each carotid siphon.   EXTRACRANIAL: Visualized paranasal sinuses were clear. Visualized mastoid air cells were clear.       Faint skull base arterial calcifications in each carotid siphon.   No acute intracranial bleed or focal mass effect.   MACRO: Alexander Flowers discussed the significance and urgency of this critical finding by epic secure chat with  JAMIL BARAKAT on 1/31/2025 at 11:57 am.  (**-RCF-**) Findings:  See findings.   Signed by: Alexander Flowers 1/31/2025 11:58 AM Dictation workstation:   [id number]    ECG 12 lead    Result Date: 1/17/2025 Poor data quality, interpretation may be adversely affected Normal sinus rhythm Left axis deviation Minimal voltage criteria for LVH, may be normal variant ( Carter product ) Anterolateral infarct , age undetermined Abnormal ECG No previous ECGs available Confirmed by Reginaldo Rangel ([id number]) on 1/17/2025 10:07:04 AM    XR chest 2 views    Result Date: 1/13/2025  Interpreted By:  Schoenberger, Joseph, STUDY: XR CHEST 2 VIEWS;  1/13/2025 10:50 am   INDICATION: Signs/Symptoms:chest pain.     COMPARISON: 02/14/2023   ACCESSION NUMBER(S): [id number]   ORDERING CLINICIAN: DEB RICHARDSON   FINDINGS: There is a left subclavian transvenous ICD unchanged from prior exam.       CARDIOMEDIASTINAL SILHOUETTE: Cardiomediastinal silhouette is normal in size and configuration.   LUNGS: No  new focal lung opacity.   ABDOMEN: No remarkable upper abdominal findings.   BONES: No acute osseous changes.       1.  No evidence of acute cardiopulmonary process. Stable chest       MACRO: None   Signed by: Joseph Schoenberger 1/13/2025 11:21 AM Dictation workstation:   [id number]       Assessment/Plan   Assessment & Plan  Acute ischemic right MCA stroke (Multi)    MDD recurrent moderate    Discussed with patient about the combination of medication that she is taking which could put her at high risk of serotonergic syndrome.  I recommend patient to stop Desipramine, since this was started for sleep and it is not helping her  Recommend 5 mg melatonin for sleep  Continue Zoloft and Cymbalta  Recommend patient to follow-up with a psychiatrist for further change in her medications since she is not getting better with her current combination and she has been taking it for more than 5 years.  I do not feel comfortable changing any of her long-term antidepressant currently while she is going through a medical crisis which needs to be addressed as an outpatient  Patient is not at imminent risk of suicide and can  be safely discharged when medically stable         Jay Saleem MD        Melolabial Transposition Flap Text: The defect edges were debeveled with a #15 scalpel blade.  Given the location of the defect and the proximity to free margins a melolabial flap was deemed most appropriate.  Using a sterile surgical marker, an appropriate melolabial transposition flap was drawn incorporating the defect.    The area thus outlined was incised deep to adipose tissue with a #15 scalpel blade.  The skin margins were undermined to an appropriate distance in all directions utilizing iris scissors.

## 2025-02-01 NOTE — CARE PLAN
Problem: Pain - Adult  Goal: Verbalizes/displays adequate comfort level or baseline comfort level  Outcome: Progressing     Problem: Safety - Adult  Goal: Free from fall injury  Outcome: Progressing     Problem: Discharge Planning  Goal: Discharge to home or other facility with appropriate resources  Outcome: Progressing     Problem: Chronic Conditions and Co-morbidities  Goal: Patient's chronic conditions and co-morbidity symptoms are monitored and maintained or improved  Outcome: Progressing     Problem: Nutrition  Goal: Nutrient intake appropriate for maintaining nutritional needs  Outcome: Progressing     Problem: General Stroke  Goal: Establish a mutual long term goal with patient by discharge  Outcome: Progressing  Goal: Maintain BP within ordered limits throughout shift  Outcome: Progressing  Goal: No symptoms of aspiration throughout shift  Outcome: Progressing  Goal: No symptoms of hemorrhage throughout shift  Outcome: Progressing  Goal: Controlled blood glucose throughout shift  Outcome: Progressing  Goal: Out of bed three times today  Outcome: Progressing     Problem: ICU Stroke  Goal: Maintain ICP within ordered limits throughout shift  Outcome: Progressing  Goal: Tolerate EVD clamping trial throughout shift  Outcome: Progressing  Goal: Tolerate ventilator weaning trial during shift  Outcome: Progressing  Goal: Maintain patent airway throughout shift  Outcome: Progressing  Goal: Achieve/maintain targeted sodium level throughout shift  Outcome: Progressing

## 2025-02-01 NOTE — H&P
Critical Care Medicine History and Physical        Subjective   Patient is a 44 y.o. female admitted on 1/31/2025  9:22 PM admitted to the ICU for large vessel occlusion requiring close neurological assessment.     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     The patient reported her expressive aphasia resolved, but she developed numbness and weakness in her right face and right arm, which feels weaker than the left. She has no symptoms on the left side or in her legs, and denies vision changes, headaches, chest pain, or shortness of breath. Though her symptoms have improved slightly, she still has numbness and weakness in her right arm and no neck pain.    ED Course: Upon arrival, the patient's vital signs were as follows: T 36.9°C, HR 93 , RR 18, /86 mmHg, and she was saturating well on room air. Initial lab results revealed a sodium level of 132, glucose of 125, and a CBC showing leukopenia with an absolute neutrophil count of 0.39. The coagulation panel showed a PT of 16.7 and an INR of 1.6. Urinalysis showed for high specific gravity and trace glucosuria. Chest x-ray was unremarkable for any acute cardiopulmonary issues.     Her initial NIHSS score was 1. A code brain attack was activated in the emergency room, and stroke neurology was consulted. Given the patient's low NIHSS score and minimal neurological deficits, stroke  neurology did not recommend tPA or TNK administration, nor did they recommend thrombectomy. A CT scan of the brain showed no acute intracranial bleed or focal mass effect. A CT angiogram of the head and neck revealed a large vessel occlusion in the mid-M2 inferior division of the right MCA, though this did not correlate with the patient's current symptoms.     Neurology at [facility] recommended ICU admission for frequent neurological evaluation. However, due to a lack of available beds, the patient was transferred to [place] for ongoing care.    In [place] ICU, she is ANO x 3 with no any neurological deficits.  NIH score 0.  And she is hemodynamically stable.    Past Medical History:   Diagnosis Date    Myocardial infarction (Multi)     x 2 with significant apical involvement    Personal history of nicotine dependence 06/10/2019    Former cigarette smoker    Personal history of other diseases of the circulatory system 2014    History of atrial fibrillation    Personal history of other diseases of the respiratory system 2022    History of acute bronchitis    Systemic lupus erythematosus, unspecified (Multi) 2013    Systemic lupus erythematosus     Past Surgical History:   Procedure Laterality Date    CERVICAL BIOPSY  W/ LOOP ELECTRODE EXCISION      Cervical Loop Electrosurgical Excision (LEEP)     SECTION, LOW TRANSVERSE      HYSTERECTOMY  2014    peripartum, supracervical, at [facility] [place]    MR HEAD ANGIO WO IV CONTRAST  2013    MR HEAD ANGIO WO IV CONTRAST 2013 [facility]    MR NECK ANGIO WO IV CONTRAST  2013    MR NECK ANGIO WO IV CONTRAST 2013 [facility]IB LEGAC[facility]    OTHER SURGICAL HISTORY  2019    Cardioverter defibrillator insertion    TOTAL HIP ARTHROPLASTY Bilateral 2014    Hip Replacement     Medications Prior to Admission   Medication Sig Dispense Refill Last Dose/Taking    albuterol 90 mcg/actuation inhaler Inhale every 6 hours if  needed.       clindamycin (Cleocin T) 1 % lotion 1 Application       clopidogrel (Plavix) 75 mg tablet Take 1 tablet (75 mg) by mouth once daily.       dapagliflozin propanediol (Farxiga) 10 mg Take 1 tablet (10 mg) by mouth once daily. 90 tablet 3     desipramine (Norpramin) 10 mg tablet Take 1 tablet (10 mg) by mouth once daily.       dulaglutide (Trulicity) 1.5 mg/0.5 mL pen injector injection Inject 1.5 mg under the skin 1 (one) time per week. sundays       DULoxetine (Cymbalta) 30 mg DR capsule Take 1 capsule (30 mg) by mouth 2 times a day.       furosemide (Lasix) 40 mg tablet Take 1 tablet (40 mg) by mouth if needed.       gabapentin (Neurontin) 100 mg capsule Take 2 capsules (200 mg) by mouth 2 times a day.       metoprolol succinate XL (Toprol-XL) 200 mg 24 hr tablet Take 1 tablet (200 mg) by mouth once daily.       montelukast (Singulair) 10 mg tablet Take 1 tablet (10 mg) by mouth once daily.       rosuvastatin (Crestor) 40 mg tablet Take 1 tablet (40 mg) by mouth once daily.       sacubitriL-valsartan (Entresto)  mg tablet Take 1 tablet by mouth 2 times a day. 180 tablet 3     sertraline (Zoloft) 100 mg tablet Take 1 tablet (100 mg) by mouth once daily.       warfarin (Coumadin) 5 mg tablet Take 1 tablet (5 mg) by mouth once daily.        Hay fever and allergy relief  Social History     Tobacco Use    Smoking status: Former     Current packs/day: 0.00     Average packs/day: 1 pack/day for 25.0 years (25.0 ttl pk-yrs)     Types: Cigarettes     Start date:      Quit date: 2016     Years since quittin.0    Smokeless tobacco: Never   Vaping Use    Vaping status: Never Used   Substance Use Topics    Alcohol use: Yes     Alcohol/week: 3.0 standard drinks of alcohol     Types: 3 Glasses of wine per week    Drug use: Not Currently     No family history on file.    Review of Systems:  Review of Systems   All other systems reviewed and are negative.       Objective     PHYSICAL EXAM     Physical  Exam  Constitutional:       Appearance: Normal appearance. She is normal weight.   HENT:      Head: Normocephalic and atraumatic.   Cardiovascular:      Rate and Rhythm: Normal rate and regular rhythm.      Pulses: Normal pulses.      Heart sounds: Normal heart sounds.      Comments: Left upper ICD  Pulmonary:      Effort: Pulmonary effort is normal.      Breath sounds: Normal breath sounds.   Abdominal:      General: Abdomen is flat. Bowel sounds are normal.      Palpations: Abdomen is soft.   Genitourinary:     General: Normal vulva.   Musculoskeletal:         General: Normal range of motion.      Cervical back: Normal range of motion.      Right lower leg: No edema.      Left lower leg: No edema.   Skin:     General: Skin is warm.      Capillary Refill: Capillary refill takes less than 2 seconds.   Neurological:      General: No focal deficit present.      Mental Status: She is alert and oriented to person, place, and time. Mental status is at baseline.      Comments: NIHSS score 0.  Speech intact, cranial nerves II to XII intact, normal coordination.  Intact sensation in upper and lower extremity bilaterally   Psychiatric:         Mood and Affect: Mood normal.        Vitals:  Most Recent:  There were no vitals filed for this visit.    Scheduled Medications:   [START ON 2/1/2025] polyethylene glycol, 17 g, oral, Daily  rosuvastatin, 40 mg, oral, Nightly         Continuous Medications:         PRN Medications:   PRN medications: hydrALAZINE **FOLLOWED BY** [START ON 2/2/2025] hydrALAZINE, labetaloL, oxygen    24hr Min/Max:  Temp  Min: 36.9 °C (98.4 °F)  Max: 36.9 °C (98.4 °F)  Pulse  Min: 77  Max: 105  BP  Min: 90/63  Max: 114/86  Resp  Min: 11  Max: 33  SpO2  Min: 84 %  Max: 98 %    LDA:          Vent settings:       Hemodynamic parameters for last 24 hours:       No intake or output data in the 24 hours ending 01/31/25 9505    Lab/Radiology/Diagnostic Review:  Results for orders placed or performed during the  hospital encounter of 01/31/25 (from the past 24 hours)   ECG 12 lead   Result Value Ref Range    Ventricular Rate 86 BPM    Atrial Rate 86 BPM    SD Interval 156 ms    QRS Duration 104 ms    QT Interval 398 ms    QTC Calculation(Bazett) 476 ms    P Axis 51 degrees    R Axis -68 degrees    T Axis 63 degrees    QRS Count 14 beats    Q Onset 215 ms    P Onset 137 ms    P Offset 193 ms    T Offset 414 ms    QTC Fredericia 449 ms   CBC and Auto Differential   Result Value Ref Range    WBC 2.4 (L) 4.4 - 11.3 x10*3/uL    nRBC 0.0 0.0 - 0.0 /100 WBCs    RBC 4.61 4.00 - 5.20 x10*6/uL    Hemoglobin 14.2 12.0 - 16.0 g/dL    Hematocrit 41.6 36.0 - 46.0 %    MCV 90 80 - 100 fL    MCH 30.8 26.0 - 34.0 pg    MCHC 34.1 32.0 - 36.0 g/dL    RDW 12.1 11.5 - 14.5 %    Platelets 163 150 - 450 x10*3/uL    Neutrophils % 16.5 40.0 - 80.0 %    Immature Granulocytes %, Automated 0.0 0.0 - 0.9 %    Lymphocytes % 59.7 13.0 - 44.0 %    Monocytes % 15.7 2.0 - 10.0 %    Eosinophils % 6.8 0.0 - 6.0 %    Basophils % 1.3 0.0 - 2.0 %    Neutrophils Absolute 0.39 (L) 1.20 - 7.70 x10*3/uL    Immature Granulocytes Absolute, Automated 0.00 0.00 - 0.70 x10*3/uL    Lymphocytes Absolute 1.41 1.20 - 4.80 x10*3/uL    Monocytes Absolute 0.37 0.10 - 1.00 x10*3/uL    Eosinophils Absolute 0.16 0.00 - 0.70 x10*3/uL    Basophils Absolute 0.03 0.00 - 0.10 x10*3/uL   Comprehensive metabolic panel   Result Value Ref Range    Glucose 125 (H) 74 - 99 mg/dL    Sodium 132 (L) 136 - 145 mmol/L    Potassium 4.3 3.5 - 5.3 mmol/L    Chloride 102 98 - 107 mmol/L    Bicarbonate 22 21 - 32 mmol/L    Anion Gap 12 10 - 20 mmol/L    Urea Nitrogen 8 6 - 23 mg/dL    Creatinine 0.59 0.50 - 1.05 mg/dL    eGFR >90 >60 mL/min/1.73m*2    Calcium 8.6 8.6 - 10.3 mg/dL    Albumin 3.9 3.4 - 5.0 g/dL    Alkaline Phosphatase 44 33 - 110 U/L    Total Protein 7.5 6.4 - 8.2 g/dL    AST 19 9 - 39 U/L    Bilirubin, Total 0.4 0.0 - 1.2 mg/dL    ALT 11 7 - 45 U/L   Troponin I, High Sensitivity    Result Value Ref Range    Troponin I, High Sensitivity 7 0 - 13 ng/L   hCG, quantitative, pregnancy   Result Value Ref Range    HCG, Beta-Quantitative <2 <5 mIU/mL   Morphology   Result Value Ref Range    RBC Morphology See Below     Teardrop Cells Few    Urinalysis with Reflex Culture and Microscopic   Result Value Ref Range    Color, Urine Light-Yellow Light-Yellow, Yellow, Dark-Yellow    Appearance, Urine Clear Clear    Specific Gravity, Urine >1.050 (N) 1.005 - 1.035    pH, Urine 5.5 5.0, 5.5, 6.0, 6.5, 7.0, 7.5, 8.0    Protein, Urine NEGATIVE NEGATIVE, 10 (TRACE), 20 (TRACE) mg/dL    Glucose, Urine 50 (TRACE) (A) Normal mg/dL    Blood, Urine NEGATIVE NEGATIVE    Ketones, Urine NEGATIVE NEGATIVE mg/dL    Bilirubin, Urine NEGATIVE NEGATIVE    Urobilinogen, Urine Normal Normal mg/dL    Nitrite, Urine NEGATIVE NEGATIVE    Leukocyte Esterase, Urine NEGATIVE NEGATIVE   Protime-INR   Result Value Ref Range    Protime 16.7 (H) 9.3 - 12.7 seconds    INR 1.6 (H) 0.9 - 1.2   APTT   Result Value Ref Range    aPTT 24.3 22.0 - 32.5 seconds            Additional Labs:  Lab Results   Component Value Date    WBC 2.4 (L) 01/31/2025    WBC 4.1 (L) 01/13/2025    WBC 3.8 (L) 04/02/2024     01/31/2025     01/13/2025     04/02/2024     (L) 01/31/2025     01/13/2025     04/02/2024    K 4.3 01/31/2025    K 3.9 01/13/2025    K 4.0 04/02/2024    UUO2NUK 25.8 12/17/2021    BUN 8 01/31/2025    BUN 10 01/13/2025    BUN 10 04/02/2024    CREATININE 0.59 01/31/2025    CREATININE 0.76 01/13/2025    CREATININE 0.95 04/02/2024    MG 1.60 01/13/2025    MG 1.60 02/15/2023    MG 1.80 01/09/2023        Assessment   Assessment & Plan  Acute ischemic right MCA stroke (Multi)      ASSESSMENT   This is a 44 years old female with complicated cardiac past medical history was transferred from [facility] to [place] after presenting to the ED with transient expressive aphasia at 10:30 AM, which lasted for about two  "minutes. Following its resolution, she developed right-sided facial numbness and weakness in her right upper extremity, which she reports as weaker than the left. On arrival, her vital signs were stable, and initial labs showed mild abnormalities, including a low sodium of 132 and leukopenia with a neutrophil count of 0.39. A CT brain scan was negative for acute intracranial pathology, and a CT angiogram revealed a right MCA large vessel occlusion that did not correlate with her symptoms. Neurology recommended ICU admission for frequent monitoring. In the ICU at [place], she is alert and oriented, with no neurological deficits (NIHSS score of 0) and remains hemodynamically stable.    Plan    PLAN     Neuro:   #Mild M2 inferior right MCA occlusion  #Transient aphasia  #Right-sided numbness and weakness  #SLE  -Per neurology at [facility] [place], operated by [facility]. ''Given subtherapeutic INR on coumadin and recurrent ischemic events, prefer transitioning to eliquis for low EF and A fib, Cardiology follow up for the same. Continue anticoagulation if MRI brain shows small stroke burden, stop if stroke burden is large, timing of restarting AC to be determined based on size\". Since patient has an ICD, we do not have a compatible MRI, we spoke with neurologist on-call initiated transfer request but after discussion patient will undergo CT scan in 24 hours,  Start heparin drip with further evaluation by neurology with plan to switch her Eliquis versus continue Coumadin.    -A&O x3, NIHSS 0  History: Depression and anxiety  -Home meds: Resume desipramine, duloxetine, Zoloft.  Holding gabapentin  -Initial CT head/CTA showed no acute intracranial bleed or focal mass effect, CTA showed a large vessel occlusion in the mid-M2 inferior division of the right MCA  -Stroke workup  -CT head without contrast stroke protocol to evaluate stroke burden  -TTE w/ bubble study ordered, pending result  -Lipid panel, A1c ordered, pending result  -Closer monitoring for " 24 hrs  any new or worsening symptoms.   -Hold Coumadin and will continue with Plavix   -Start low intensity heparin drip without bolus.   -Blood pressure goals: avoid hypotension SBP <100 that could worsen cerebral perfusion, Ischemic stroke,  early permissive hypertension SBP < 220 mmHg with cautious inpatient lowering.   -NPO until nurse bedside swallow assessment   -Arterial vasculopathy and coagulopathy workup sent.  -Neurology consulted, appreciate recommendation.  -Psychiatry consulted, appreciate recommendation.    -Pain: none   -Sedation: none  -[facility] ICU    Cardiac:   No acute issue .  History: chronic A-fib on warfarin, STEMI s/p PCI of the distal left main and proximal LAD with mechanical thrombectomy (6/2/19), HFrEF LVEF 25-30%, cardiomyopathy, ventricular tachycardia s/p Medtronic ICD implant (9/23/19), left ventricular thrombus 9/2022, HLD, pericarditis.  -Home meds: Holding Farxiga, metoprolol succinate, Entresto, furosemide in the setting of permissive hypertension.  Will hold warfarin.  Will resume Plavix and rosuvastatin  -Continuous cardiac monitoring  -Blood pressure goals per neurology recommendation  -Monitor hemodynamics,   -Requiring pressors  -Maintain Goal MAP > 65  -TTE w/ bubble study, ordered   -Last echo 4/2024 reduced left ventricular systolic function (25-30% ejection fraction), abnormal apical and septal segments, impaired diastolic filling, mildly reduced right ventricular function, and global left ventricular hypokinesis with minor regional variations.  -Monitor and optimize electrolytes, keep K > 4.0, Mag > 2.0    Pulmonary:   No acute issues, on room air  -History: pulmonary Embolism (2016 on OAC Eliquis for short period and currently on Coumadin for A-fib  -Home meds: Coumadin on hold  -Imaging: Chest x-ray was clear for any acute cardiopulmonary process  -Continuous pulse oximetry   -O2 PRN to maintain SpO2 > 94%, wean as tolerated  -OOB to chair, when  appropriate    Gastrointestinal:  No acute issues  -History:GERD  -Diet: Currently n.p.o. for swallow eval, will add diabetic diet  -Supplementation: None  -Prophylaxis: None  -Bowel regimen: MiraLAX as needed    Renal:   No acute issues  -Daily RFP,Mg,Phos  -Electrolytes: Replete per protocol, goal K>4 Phos >3 Mg >2    Net IO Since Admission: No IO data has been entered for this period [01/31/25 2143]  Results from last 72 hours   Lab Units 01/31/25  1207   BUN mg/dL 8   CREATININE mg/dL 0.59       Endocrine:   No acute issues  -Last hemoglobin A1c 8.3 on 4/2024  -Monitor blood glucose, continue Novolog SSC for glycemic control  -Goal BS < 180  -Follow hypoglycemic protocol  -History: Di DM2 type II  -Home meds: On Trulicity, will hold  Results from last 7 days   Lab Units 01/31/25  1207   GLUCOSE mg/dL 125*        Hematology:   #Leukopenia  #Thrombocytopenia  #?SLE induced versus drug-induced neutropenia  -History: SLE, PE.   -Home meds: Coumadin  -Monitor HH  -Daily CBC, coags  -SLE flare labs with anti-double-stranded, C3, C4.  -Peripheral smear to screen for abnormal leukocyte morphology  -Reticulocytes to assess bone marrow function  -Will defer to day team starting steroids.  -DVT Prophylaxis: On Coumadin, on hold pending stroke burden assessment.  Started on heparin drip pending decision on anticoagulation.    Results from last 7 days   Lab Units 01/31/25  1207   HEMOGLOBIN g/dL 14.2   HEMATOCRIT % 41.6   PLATELETS AUTO x10*3/uL 163       Infectious Disease:   #Leukopenia   # Moderate neutropenia with absolute neutrophilic count 640  -WBC 2.7 with BL 3.8-4.  -History: SLE currently not on therapy.   -Home meds: Not on any home meds.   -Afebrile and hemodynamically stable  -will closely monitor white cell count  -Inflammatory markers showed negative CRP with slightly ESR at 32,.  -Abx: None  -Cultures: None  Results from last 7 days   Lab Units 01/31/25  1207   WBC AUTO x10*3/uL 2.4*     Temp (24hrs),  Av.9 °C (98.4 °F), Min:36.9 °C (98.4 °F), Max:36.9 °C (98.4 °F)     Musculoskeletal:   No acute issues  -PT/OT to evaluate    Lines/Tubes/Drains:   PIV    Code status: Full Code     Dispo: Patient to remain in ICU    Assessment and plan discussed with my attending.   Nirav Campbell MD   Internal Medicine, PGY-2 .

## 2025-02-01 NOTE — DISCHARGE INSTRUCTIONS
Homer Donohue, you were hospitalized for a transient ischemic attack, TIA/mini stroke.  The neurologist felt the right MCA occlusion was not acutely contributing to your symptoms and may have been there prior.  This should still be followed up on by a vascular medicine specialist given your complicated history of cardiac disease as well as different clots.  Your INR was subtherapeutic on admission at 1.6.  We put you on a heparin drip while you are inpatient and we will plan to transition you back to Coumadin.  I will give you 7.5 mg today before you leave and then resume 5 mg tomorrow.  The repeat head CT this afternoon did not show any bleeding in the brain or acute changes around the right MCA occlusion.  The echocardiogram showed an ejection fraction of 23% and the bubble study to look for a hole between the chambers was negative.  You have no neurologic deficits at the time of exam on discharge and were deemed stable to discharge home.  Of note your hemoglobin A1c which is the 3-month average of your sugars was 8.9% on admission.  We would like this number to be less than 7%.  Also your LDL (the bad cholesterol) was quite elevated at 149, we would like this to ideally be less than 70 in someone with your history.  I recommend you follow-up with an endocrinologist or a primary care doctor, whoever is managing your diabetes and high cholesterol, within 3 months to repeat these levels and potentially adjust your medications.  You may need an additional medication besides rosuvastatin to control your cholesterol.  Please talk about this with your primary care doctor.    Med changes:  -You will be given 7.5 mg of Coumadin prior to discharge today.  -Please resume your Coumadin dose of 5 mg tomorrow evening.  -You may resume all of your other home medications, including the Entresto, metoprolol, Farxiga, and Trulicity.    To do:  - Call your Coumadin clinic on Monday morning and go for an appointment to check your  INR.  - Please call the vascular medicine specialist to schedule an appointment within 1 month of discharge.  - Please take your medications as prescribed.   - Make an appointment with your primary care doctor to follow up on your high cholesterol and elevated A1c.    If you have any new or worsening symptoms seek medical attention.    Thank you for allowing us to participate in your care!

## 2025-02-01 NOTE — PROGRESS NOTES
Occupational Therapy                 Therapy Communication Note    Patient Name: Jayde Daugherty  MRN: [id number]  Department: [facility] ICU  Room: 2121/2121-A  Today's Date: 2/1/2025     Discipline: Occupational Therapy    Screen: Chart review completed.  Functional screen completed at 1413.  Patient is independent with functional mobility task in room and reports has been walking to the bathroom. No acute OT needs identified at this time as patient reports is back to baseline. Will discharge OT order.

## 2025-02-01 NOTE — HOSPITAL COURSE
This is a 44 year old female with past medical history of afib, anterior STEMI from LV thrombus (on warfarin), has a pacemaker CAD, HFrEF, T2DM, ischemic cardiomyopathy, lupus, HLD, HTN, anxiety and depression   CT head: No intracranial hemorrhage  CTA brain: Large vessel occlusion of a mid M2 inferior  division branch of the right MCA    CXR with no acute abnormality    Labs: CBC with leukopenia WBC 2.4, CMP with sodium 132 otherwise within normal limits, troponin normal, negative pregnancy test, UA negative for leuks or nitrites, INR 1.6    patient states that around 1030 this morning he had an approximate 2-minute episode in which she had some numbness and tingling of the right side of her face and right arm as well as expressive aphasia  Her NIH on presentation to the ED was 1  TNK was not given due to no indication as NIH was 1 with no gross deficits  Exam there just having decree sensation in right upper extremity no other gross motor neurologic or vascular deficits

## 2025-02-02 NOTE — DISCHARGE SUMMARY
Discharge Diagnosis  Acute ischemic right MCA stroke (Multi)    Issues Requiring Follow-Up  CVA  Chronic afib/LV thrombus on Warfarin    Test Results Pending At Discharge  Pending Labs       Order Current Status    BENY with Reflex to DIANA In process    ANCA-Associated Vasculitis Profile (ANCA,MPO,PR3) In process    Hemoglobin A1C In process    Lipoprotein a In process    Lupus Anticoag. With Interpretation[Sarkar] In process    Pathologist Review-CBC Differential In process            Hospital Course    Jayde Daugherty is a 44-year-old female with a complicated cardiac past medical history including atrial fibrillation, STEMI status post PCI, HFrEF, ventricular tachycardia s/p Med[employer] ICD, LV thrombus, HLD who presented to the [facility] emergency department with transient expressive aphasia that began yesterday at approximately 10:30 AM and lasted for about 2 minutes.  Following resolution she later developed right sided facial numbness and weakness of the right upper extremity.  CT brain was negative for acute intracranial pathology however CT angiogram revealed a right MCA large vessel occlusion that did not correlate with her symptoms.  INR subtherapeutic.  Teleneurology was consulted who recommended ICU admission for every hour neurochecks therefore patient was transferred to [facility] for ICU management.  She was started on heparin infusion.  Patient was unable to get MRI at our facility therefore repeat CT head was done today which did not show any discernible loss of gray-white matter differentiation in the region of the occluded right MCA branch.  No acute intracranial hemorrhage or mass effect.  Echocardiogram with negative bubble study.  He was evaluated by neurology who cleared patient for discharge.  Patient's cardiologist and vascular medicine providers were both updated on the patient's care.  Patient was given 7.5 mg warfarin prior to discharge and advised to continue her home dosing of 5 mg.   Emphasized importance for close follow-up with cardiology and vascular medicine as well as Coumadin clinic.    Patient provided referral to Dr. Willard, vascular medicine, psychiatry, and neurology upon disposition.      Pertinent Physical Exam At Time of Discharge  Physical Exam    General: Awake, alert/oriented x4, well developed, no acute distress  Head: Atraumatic/Normocephalic  Cardiovascular: RRR, S1/S2, no murmurs, rubs, or gallops, radial pulses +2, no edema of extremities  Pulmonary: CTAB, no respiratory distress. No wheezes, rales, or ronchi  Abdomen: +BS, soft, non-tender, nondistended, no guarding or rebound, no masses noted  MSK: No joint swelling, normal movements of all extremities. Range of motion- normal.  Extremities: FROM, no edema, wounds, or contusions  Skin- No lesions, contusions, or erythema.  Neuro: Alert/oriented x4, no focal motor or sensory deficits      Home Medications     Medication List      CONTINUE taking these medications     albuterol 90 mcg/actuation inhaler   clopidogrel 75 mg tablet; Commonly known as: Plavix   dapagliflozin propanediol 10 mg; Commonly known as: Farxiga; Take 1   tablet (10 mg) by mouth once daily.   desipramine 10 mg tablet; Commonly known as: Norpramin   DULoxetine 30 mg DR capsule; Commonly known as: Cymbalta   Entresto  mg tablet; Generic drug: sacubitriL-valsartan; Take 1   tablet by mouth 2 times a day.   furosemide 40 mg tablet; Commonly known as: Lasix   gabapentin 100 mg capsule; Commonly known as: Neurontin   metoprolol succinate  mg 24 hr tablet; Commonly known as:   Toprol-XL   montelukast 10 mg tablet; Commonly known as: Singulair   rosuvastatin 40 mg tablet; Commonly known as: Crestor   sertraline 100 mg tablet; Commonly known as: Zoloft   Trulicity 1.5 mg/0.5 mL pen injector injection; Generic drug:   dulaglutide   warfarin 5 mg tablet; Commonly known as: Coumadin; Take as directed. If   you are unsure how to take this medication,  talk to your nurse or doctor.       Outpatient Follow-Up  Future Appointments   Date Time Provider Department Center   4/22/2025  4:00 PM Migue Bonds MD [id number] Nelson Nicolas, DO  Internal medicine, PGY 3

## 2025-02-03 ENCOUNTER — TELEPHONE (OUTPATIENT)
Dept: CARDIOLOGY | Facility: CLINIC | Age: 45
End: 2025-02-03

## 2025-02-03 ENCOUNTER — ANTICOAGULATION - WARFARIN VISIT (OUTPATIENT)
Dept: CARDIOLOGY | Facility: CLINIC | Age: 45
End: 2025-02-03
Payer: MEDICAID

## 2025-02-03 DIAGNOSIS — I48.0 PAROXYSMAL ATRIAL FIBRILLATION (MULTI): Primary | ICD-10-CM

## 2025-02-03 DIAGNOSIS — I24.0 CORONARY THROMBOSIS (MULTI): ICD-10-CM

## 2025-02-03 LAB
ANA PATTERN: ABNORMAL
ANA SER QL HEP2 SUBST: POSITIVE
ANA TITR SER IF: ABNORMAL {TITER}
CENTROMERE B AB SER-ACNC: <0.2 AI
CHROMATIN AB SERPL-ACNC: <0.2 AI
DRVVT SCREEN TO CONFIRM RATIO: 1.19 RATIO
DRVVT/DRVVT CFM NRMLZD PPP-RTO: 1.28 RATIO
DRVVT/DRVVT CFM P DOAC NEUT NORM PPP-RTO: 0.93 RATIO
DSDNA AB SER-ACNC: 1 IU/ML
ENA JO1 AB SER QL IA: <0.2 AI
ENA RNP AB SER IA-ACNC: <0.2 AI
ENA SCL70 AB SER QL IA: <0.2 AI
ENA SM AB SER IA-ACNC: <0.2 AI
ENA SM+RNP AB SER QL IA: <0.2 AI
ENA SS-A AB SER IA-ACNC: >8 AI
ENA SS-B AB SER IA-ACNC: <0.2 AI
LA 2 SCREEN W REFLEX-IMP: NORMAL
NORMALIZED SCT PPP-RTO: 0.72 RATIO
PATH REVIEW-CBC DIFFERENTIAL: NORMAL
POC INR: 1.5
POC PROTHROMBIN TIME: NORMAL
RIBOSOMAL P AB SER-ACNC: 0.2 AI
SILICA CLOTTING TIME CONFIRMATION: 1.22 RATIO
SILICA CLOTTING TIME SCREEN: 0.88 RATIO

## 2025-02-03 PROCEDURE — 99211 OFF/OP EST MAY X REQ PHY/QHP: CPT

## 2025-02-03 PROCEDURE — 85610 PROTHROMBIN TIME: CPT | Mod: QW

## 2025-02-03 RX ORDER — ENOXAPARIN SODIUM 100 MG/ML
INJECTION SUBCUTANEOUS
Qty: 20 EACH | Refills: 0 | Status: SHIPPED | OUTPATIENT
Start: 2025-02-03

## 2025-02-03 NOTE — TELEPHONE ENCOUNTER
THIS CONVERSATION WITH DR. MCDANIEL REGARDING THIS PT, WHO WAS HOSPITALIZED FOR STROKE/TIA AND SENT HOME WITH SUBTHERPEUTIC INR.

## 2025-02-03 NOTE — PROGRESS NOTES
Patient identification verified with 2 identifiers.    Location: [facility] - [address] [address] [phone] option #1     Referring Physician: BRIDGETTE MCDANIEL  Enrollment/ Re-enrollment date: 2026   INR Goal: 2.0-3.0  INR monitoring is per [facility] protocol.  Anticoagulation Medication: warfarin  Indication: Atrial Fibrillation/Atrial Flutter    Subjective   Bleeding signs/symptoms: No    Bruising: No   Major bleeding event: No  Thrombosis signs/symptoms: No  Thromboembolic event: No  Missed doses: No  Extra doses: No  Medication changes: No  Dietary changes: No  Change in health: Yes  CVA   Change in activity: No  Alcohol: No  Other concerns: No    Upcoming Procedures:  Does the Patient Have any upcoming procedures that require interruption in anticoagulation therapy? no  Does the patient require bridging? YES, PER CONVERSATION WITH DR. MCDANIEL      Anticoagulation Summary  As of 2/3/2025      INR goal:  2.0-3.0   TTR:  50.9% (1.3 y)   INR used for dosin.50 (2/3/2025)   Weekly warfarin total:  35 mg               Assessment/Plan   Subtherapeutic     1. New dose:  EXTRA 2.5 MG ON  AND WILL TAKE EXTRA 2.5 ON 2/3 AND      2. Next INR:  4 DAYS      Education provided to patient during the visit:  Patient instructed to call in interim with questions, concerns and changes.   Patient educated on interactions between medications and warfarin.   Patient educated on dietary consistency in vitamin k consumption.   Patient educated on affects of alcohol consumption while taking warfarin.   Patient educated on signs of bleeding/clotting.   Patient educated on compliance with dosing, follow up appointments, and prescribed plan of care.      4

## 2025-02-04 LAB
ATRIAL RATE: 86 BPM
LPA SERPL-MCNC: 116 MG/DL
P AXIS: 51 DEGREES
P OFFSET: 193 MS
P ONSET: 137 MS
PR INTERVAL: 156 MS
Q ONSET: 215 MS
QRS COUNT: 14 BEATS
QRS DURATION: 104 MS
QT INTERVAL: 398 MS
QTC CALCULATION(BAZETT): 476 MS
QTC FREDERICIA: 449 MS
R AXIS: -68 DEGREES
T AXIS: 63 DEGREES
T OFFSET: 414 MS
VENTRICULAR RATE: 86 BPM

## 2025-02-05 ENCOUNTER — OFFICE VISIT (OUTPATIENT)
Dept: CARDIOLOGY | Facility: HOSPITAL | Age: 45
End: 2025-02-05
Payer: MEDICAID

## 2025-02-05 ENCOUNTER — OFFICE VISIT (OUTPATIENT)
Dept: NEUROLOGY | Facility: CLINIC | Age: 45
End: 2025-02-05
Payer: MEDICAID

## 2025-02-05 ENCOUNTER — TELEPHONE (OUTPATIENT)
Dept: NEUROLOGY | Facility: CLINIC | Age: 45
End: 2025-02-05

## 2025-02-05 VITALS
DIASTOLIC BLOOD PRESSURE: 92 MMHG | WEIGHT: 187 LBS | BODY MASS INDEX: 31.92 KG/M2 | SYSTOLIC BLOOD PRESSURE: 124 MMHG | HEIGHT: 64 IN | HEART RATE: 105 BPM

## 2025-02-05 VITALS
HEIGHT: 64 IN | DIASTOLIC BLOOD PRESSURE: 66 MMHG | HEART RATE: 100 BPM | SYSTOLIC BLOOD PRESSURE: 98 MMHG | WEIGHT: 187 LBS | BODY MASS INDEX: 31.92 KG/M2

## 2025-02-05 DIAGNOSIS — I50.20 HFREF (HEART FAILURE WITH REDUCED EJECTION FRACTION): ICD-10-CM

## 2025-02-05 DIAGNOSIS — E78.00 PURE HYPERCHOLESTEROLEMIA: Primary | ICD-10-CM

## 2025-02-05 DIAGNOSIS — Z95.810 PRESENCE OF AUTOMATIC (IMPLANTABLE) CARDIAC DEFIBRILLATOR: ICD-10-CM

## 2025-02-05 DIAGNOSIS — Z86.73 HISTORY OF TIA (TRANSIENT ISCHEMIC ATTACK): Primary | ICD-10-CM

## 2025-02-05 DIAGNOSIS — I63.511 ACUTE ISCHEMIC RIGHT MCA STROKE (MULTI): ICD-10-CM

## 2025-02-05 DIAGNOSIS — I25.5 ISCHEMIC CARDIOMYOPATHY: ICD-10-CM

## 2025-02-05 LAB
ANCA AB PATTERN SER IF-IMP: NORMAL
ANCA IGG TITR SER IF: NORMAL {TITER}
MYELOPEROXIDASE AB SER-ACNC: 0 AU/ML (ref 0–19)
PROTEINASE3 AB SER-ACNC: 1 AU/ML (ref 0–19)

## 2025-02-05 PROCEDURE — 99214 OFFICE O/P EST MOD 30 MIN: CPT | Performed by: PSYCHIATRY & NEUROLOGY

## 2025-02-05 PROCEDURE — 3050F LDL-C >= 130 MG/DL: CPT | Performed by: PSYCHIATRY & NEUROLOGY

## 2025-02-05 PROCEDURE — 3052F HG A1C>EQUAL 8.0%<EQUAL 9.0%: CPT | Performed by: PSYCHIATRY & NEUROLOGY

## 2025-02-05 PROCEDURE — 3074F SYST BP LT 130 MM HG: CPT | Performed by: NURSE PRACTITIONER

## 2025-02-05 PROCEDURE — 3008F BODY MASS INDEX DOCD: CPT | Performed by: NURSE PRACTITIONER

## 2025-02-05 PROCEDURE — 3008F BODY MASS INDEX DOCD: CPT | Performed by: PSYCHIATRY & NEUROLOGY

## 2025-02-05 PROCEDURE — 3078F DIAST BP <80 MM HG: CPT | Performed by: PSYCHIATRY & NEUROLOGY

## 2025-02-05 PROCEDURE — 99214 OFFICE O/P EST MOD 30 MIN: CPT | Performed by: NURSE PRACTITIONER

## 2025-02-05 PROCEDURE — 3050F LDL-C >= 130 MG/DL: CPT | Performed by: NURSE PRACTITIONER

## 2025-02-05 PROCEDURE — 3080F DIAST BP >= 90 MM HG: CPT | Performed by: NURSE PRACTITIONER

## 2025-02-05 PROCEDURE — 3074F SYST BP LT 130 MM HG: CPT | Performed by: PSYCHIATRY & NEUROLOGY

## 2025-02-05 PROCEDURE — 3052F HG A1C>EQUAL 8.0%<EQUAL 9.0%: CPT | Performed by: NURSE PRACTITIONER

## 2025-02-05 RX ORDER — ROSUVASTATIN CALCIUM 20 MG/1
20 TABLET, COATED ORAL DAILY
Qty: 90 TABLET | Refills: 3 | Status: SHIPPED | OUTPATIENT
Start: 2025-02-05 | End: 2026-02-05

## 2025-02-05 RX ORDER — SPIRONOLACTONE 25 MG/1
12.5 TABLET ORAL DAILY
Qty: 45 TABLET | Refills: 3 | Status: SHIPPED | OUTPATIENT
Start: 2025-02-05 | End: 2026-02-05

## 2025-02-05 ASSESSMENT — PAIN SCALES - GENERAL: PAINLEVEL_OUTOF10: 0-NO PAIN

## 2025-02-05 NOTE — PROGRESS NOTES
"Primary Care Physician: Arminda Thompson MD  Date of Visit: 02/05/2025 10:00 AM EST  Location of visit: [facility]     Chief Complaint:   Chief Complaint   Patient presents with    Hospital Follow-up     Pt c/o increased SOB with exertion        HPI / Summary:   Jayde Daugherty is a 44 y.o. female presents for followup. Seen in collaboration with Dr. Bonds. She subsequently was hospitalized 1/31/25 to 2/1/25 for evaluation of transient expressive aphasia secondary to TIA and incidentally noted  on CTA to have right MCA large vessel occlusion and unlikely related to her transient symptoms per neurology. INR was subtherapeurtic (1.6). Echocardiogram was negative for bubble study. LV function 23%. . HS troponin was 7. She reports prior to her hospitalization she was taking Warfarin 7.5 mg every other day for a few days as she ran out of her 5 mg tablet of Warfarin and issue with prescription. She had also ran out of Clopidogrel during this same time. She has not been taking Rosuvastatin 40 mg daily due to myalgias. She had dyspnea on exertion walking from her car to appointment today. She was seen on 1/13/25 in emergency room for chest pain that had been constant for a week. It was not exertional. No change with positional changes. No further episodes. HS troponin were 17 and 12. She states work is very stressful. She has had difficulties getting to appointments due to work. She has submitted paperwork for intermittent FMLA. She has an occasional lightheadedness at which time eyes go \"dark\" and she feels like her heart is not beating regular. This occurs twice a month.  The patient denies chest pain, palpitations, syncope, orthopnea, paroxysmal nocturnal dyspnea, lower extremity edema, or bleeding problems.    She has not been taking Spironolactone. She does not know how long she has not had the medication.       Past Medical History:   Diagnosis Date    Myocardial infarction (Multi)     x 2 " with significant apical involvement    Personal history of nicotine dependence 06/10/2019    Former cigarette smoker    Personal history of other diseases of the circulatory system 2014    History of atrial fibrillation    Personal history of other diseases of the respiratory system 2022    History of acute bronchitis    Systemic lupus erythematosus, unspecified 2013    Systemic lupus erythematosus        Past Surgical History:   Procedure Laterality Date    CERVICAL BIOPSY  W/ LOOP ELECTRODE EXCISION      Cervical Loop Electrosurgical Excision (LEEP)     SECTION, LOW TRANSVERSE      HYSTERECTOMY  2014    peripartum, supracervical, at [facility]    MR HEAD ANGIO WO IV CONTRAST  2013    MR HEAD ANGIO WO IV CONTRAST 2013 [facility]    MR NECK ANGIO WO IV CONTRAST  2013    MR NECK ANGIO WO IV CONTRAST 2013 [facility]    OTHER SURGICAL HISTORY  2019    Cardioverter defibrillator insertion    TOTAL HIP ARTHROPLASTY Bilateral 2014    Hip Replacement          Social History:   reports that she quit smoking about 9 years ago. Her smoking use included cigarettes. She started smoking about 34 years ago. She has a 25 pack-year smoking history. She has never used smokeless tobacco. She reports current alcohol use of about 3.0 standard drinks of alcohol per week. She reports that she does not currently use drugs.     Family History:  family history is not on file.      Allergies:  Allergies   Allergen Reactions    Hay Fever And Allergy Relief Runny nose       Outpatient Medications:  Current Outpatient Medications   Medication Instructions    albuterol 90 mcg/actuation inhaler 2 puffs, Every 6 hours PRN    clopidogrel (PLAVIX) 75 mg, Daily    dapagliflozin propanediol (FARXIGA) 10 mg, oral, Daily    desipramine (NORPRAMIN) 10 mg, Daily    DULoxetine (CYMBALTA) 30 mg, 2 times daily    enoxaparin (Lovenox) 40 mg/0.4 mL syringe One injection twice  "daily every 12 hours Continue until directed by coumadin clinic with therapeutic INR.    furosemide (LASIX) 40 mg, Daily PRN    gabapentin (NEURONTIN) 200 mg, 2 times daily    metoprolol succinate XL (TOPROL-XL) 200 mg, Daily    montelukast (SINGULAIR) 10 mg, Daily    rosuvastatin (CRESTOR) 40 mg, Daily    sacubitriL-valsartan (Entresto)  mg tablet 1 tablet, oral, 2 times daily    sertraline (ZOLOFT) 100 mg, Daily    Trulicity 1.5 mg, Weekly    warfarin (Coumadin) 5 mg tablet Take 1 tablet (5 mg) by mouth once daily.       Physical Exam:  Vitals:    02/05/25 1018   BP: (!) 124/92   Patient Position: Sitting   Pulse: 105   Weight: 84.8 kg (187 lb)   Height: 1.626 m (5' 4\")     Wt Readings from Last 5 Encounters:   02/05/25 84.8 kg (187 lb)   02/01/25 85.2 kg (187 lb 13.3 oz)   01/31/25 87.1 kg (192 lb)   01/13/25 83.9 kg (185 lb)   06/20/24 88.4 kg (194 lb 12.8 oz)     Body mass index is 32.1 kg/m².     GENERAL: alert, cooperative, pleasant, in no acute distress  SKIN: warm and dry  NECK: Normal JVD, negative HJR  CARDIAC: Regular rate and rhythm with no rubs, murmurs, or gallops  CHEST: Normal respiratory efforts, lungs clear to auscultation bilaterally.  ABDOMEN: soft, nontender, nondistended  EXTREMITIES: no edema, +2 palpable RP bilaterally       Last Labs:  CMP:  Recent Labs     02/01/25  0441 01/31/25  1207 01/13/25  1026    132* 137   K 3.8 4.3 3.9    102 99   CO2 28 22 30   ANIONGAP 11 12 12   BUN 10 8 10   CREATININE 0.77 0.59 0.76   EGFR >90 >90 >90   GLUCOSE 130* 125* 219*     Recent Labs     02/01/25  0441 01/31/25  1207 01/13/25  1026   ALBUMIN 3.6 3.9 4.2   ALKPHOS 39 44 55   ALT 12 11 14   AST 14 19 14   BILITOT 0.4 0.4 0.6     CBC:  Recent Labs     02/01/25  0441 01/31/25  2258 01/31/25  1207   WBC 2.5* 2.7* 2.4*   HGB 13.3 13.6 14.2   HCT 41.2 41.2 41.6   * 148* 163   MCV 93 91 90     COAG:   Recent Labs     02/03/25  1358 02/01/25  0441 09/23/23  0000 02/15/23  1057 " 01/05/23  0603 01/04/23  1907   INR 1.50 1.7*   < >  --    < > 2.4*   DDIMERVTE  --   --   --  459  --  484    < > = values in this interval not displayed.     HEME/ENDO:  Recent Labs     01/31/25  2258 04/02/24  1325 02/15/23  0506 09/29/22  1628 12/21/21  1137 12/17/21  1539   FERRITIN  --   --   --   --   --  321*   TSH  --  1.77  --  0.82 1.55  --    HGBA1C 8.9* 8.3* 6.5* 6.2* 9.0*  --       CARDIAC:   Recent Labs     01/31/25 2204 01/31/25  1207 01/13/25  1128 01/13/25  1026 02/14/23  1254 02/14/23  1113   TROPHS  --  7 12 17*   < > 8   *  --   --  314*  --  170*    < > = values in this interval not displayed.     Recent Labs     01/31/25 2206 04/02/24  1325 02/15/23  0506 09/29/22  1628 09/19/22  1013   CHOL 231* 259* 170 276* 204*   LDLF  --   --  97 174* 128*   LDLCALC 148* 161*  --   --   --    HDL 45.5 56.9 47.6 68.3 58.8   TRIG 190* 204* 129 170* 88       Last Cardiology Tests:  ECG:  Reviewed EKG from 1/31/25- normal sinus rhythm HR 74, with occasional PVC, LAD, possible prior anterolateral infarct, non specific T wave abnormality    Echo:  2/1/25 Echocardiogram  CONCLUSIONS:   1. Left ventricular ejection fraction is severely decreased, calculated by Lopez's biplane at 23%.   2. There is global hypokinesis of the left ventricle with minor regional variations.   3. Spectral Doppler shows a Grade I (impaired relaxation pattern) of left ventricular diastolic filling with normal left atrial filling pressure.   4. Left ventricular cavity size is mildly dilated.   5. There is normal right ventricular global systolic function.   6. Mild to moderate mitral valve regurgitation.   7. Right ventricular systolic pressure is within normal limits.       4/17/24  CONCLUSIONS:   1. Left ventricular systolic function is severely decreased with a 25-30% estimated ejection fraction.   2. Apical septal segment, apical inferior segment, and apex are abnormal.   3. Spectral Doppler shows an impaired relaxation  pattern of left ventricular diastolic filling.   4. There is mildly reduced right ventricular systolic function.   5. There is global hypokinesis of the left ventricle with minor regional variations.    Cath:  Right heart catheterization January 6, 2023  CONCLUSIONS:   1. Normal left and right heart filling pressures.   2. No evidence of pulmonary hypertension.   3. Low normal cardiac index with elevated SVR (1498).   4. No evidence of intracardiac shunt.    Cardiac catheterization September 19, 2022  CONCLUSIONS:   1. No significant CAD in a codominant system.    Cardiac catheterization June 2, 2019  Coronary Lesion Summary:  Vessel   Stenosis    Vessel Segment  LAD    100% stenosis proximal to mid  CONCLUSIONS:   1. Successful OCT guided PCI of the distal left main and proximal LAD with mechanical thrombectomy.   2. 100% thormbotic occlusion of proximal-mid LAD with large thrombus burden in distal LM and proximal LAD in a codominant system.   3. Likely embolic vs. insitu thrombosis with otherwise angiographically normal coronary arteries.   4. Elevated LVEDP.   5. No aortic stenosis.   6. Left Ventricular end-diastolic pressure = 35.           Cardiac Imaging:  CT angio of the chest January 4, 2023  FINDINGS:     Pulmonary arteries are adequately opacified without acute or chronic  filling defects.       The heart is normal in size without pericardial effusion.       Thoracic lymph nodes are not enlarged.     There is no pleural effusion, pleural thickening, or pneumothorax.       CTA of head and neck 1/31/25      INTERNAL CAROTID ARTERIES: Normal in course and caliber    IMPRESSION:  Large vessel occlusion of the mid M2 inferior division branch of the  right MCA with symmetric appearance of bilateral distal M3/M4  branches of the inferior divisions of the MCAs.      Poor visualization of the M2 superior division branches of the right  MCA without focal definite occlusion appreciated.      Remaining intracranial  vasculature is within normal limits.      Cervical vasculature is unremarkable.      Critical Finding:  See findings. Notification was initiated on  1/31/2025 at 12:28 pm by  Ignacio Ragland.  (**-OCF-*    CT head 2/1/25  IMPRESSION:  No discernible loss of gray-white differentiation in the region of  the occluded right MCA branch. No acute intracranial hemorrhage or  mass effect.    CT head 1/31/25  IMPRESSION:  Faint skull base arterial calcifications in each carotid siphon.      No acute intracranial bleed or focal mass effect.            Assessment/Plan     Jayde was seen today for hospital follow-up.  Diagnoses and all orders for this visit:  Pure hypercholesterolemia (Primary)  -     rosuvastatin (Crestor) 20 mg tablet; Take 1 tablet (20 mg) by mouth once daily.  -     Lipid Panel; Future  Ischemic cardiomyopathy  -     spironolactone (Aldactone) 25 mg tablet; Take 0.5 tablets (12.5 mg) by mouth once daily.  -     Basic metabolic panel; Future  -     Basic metabolic panel; Future  Presence of automatic (implantable) cardiac defibrillator  -     Cardiac device check - In Clinic; Future  HFrEF (heart failure with reduced ejection fraction)      In summary Ms. 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 She was seen in the emergency room for chest pain on 1/13. No further episodes. I suspect was non cardiac as she reports it had been constant all week. She subsequently was hospitalized 1/31/25 to 2/1/25 for evaluation  of transient expressive aphasia secondary to TIA and incidentally noted to have right MCA large vessel occlusion and unlikely related to her transient symptoms per neurology. She had run out of Clopidogrel and was not taking Warfarin as prescribed due to issues with obtaining prescription for 5 mg tablets thus INR was subtherapeutic. She is now on Lovenox until INR is therapeutic. She has Coumadin Clinic appointment on Friday. I did restart Spironolactone 12.5 mg daily given her cardiomyopathy as she has not been taking. I have ordered blood work to be done in one week. Heart rate was initially 105, repeat manual radial pulse was 92 bpm. I have placed order for device interrogation as she is overdue and has these episodes of lightheadedness with sensation that heart is not in rhythm. She is going to reschedule cardiopulmonary stress test. I have also ordered blood work to be done in 3 months. Recent lipid panel is elevated. I did instruct her take half tablet of Rosuvastatin as she had not been taking Rosuvastatin due to myalgias. She will follow up with advanced heart failure. She will follow up with Dr. Juárez. I did instruct her if she has difficulties getting her prescriptions she needs to call our office. She will follow up in 3-4 months.       Orders:  No orders of the defined types were placed in this encounter.     Followup Appts:  Future Appointments   Date Time Provider Department Center   2/5/2025  2:30 PM Michael Boykin MD [id number] [facility]   [facility]2025  [id number] [id number] [facility]   4/22/2025  4:00 PM Migue Bonds MD [id number] [facility]           ____________________________________________________________  Charlene Nathan, APRN-CNP  [facility]  [facility]

## 2025-02-05 NOTE — PATIENT INSTRUCTIONS
Start Spironolactone 12.5 mg once a day  Start Rosuvastatin 20 mg once a day  Check blood work in one week BMP  Check fasting lipid panel and BMP in 3 months  Device clinic appt  Follow up with Dr. Rodriguez  Follow up with Dr. Juárez  Follow up in 3-4 months  If you run our of cardiac meds you need to call our office  Schedule cardiopulmonary stress test- call [phone]

## 2025-02-05 NOTE — PROGRESS NOTES
Jayde Daugherty is a 44 y.o. year old female presenting for Acute ischemic right MCA stroke ([facility] 1/31/2025.  c/o fatigue, headache with right eye pain)     HPI:  Patient presents to the clinic to establish care after admission for Acute Ischemic Right MCA Stroke.  Doctors ultimately went with the diagnosis of a TIA.  PMHx including atrial fibrillation, STEMI status post PCI, HFrEF, ventricular tachycardia s/p [employer] ICD, LV thrombus and HLD. Patient was admitted with symptoms of transient difficulty speaking/expressing, gurgling sensation and right sided numbness at her PCP's office.  CT brain was negative for acute intracranial pathology however CT angiogram revealed a right MCA large vessel occlusion that did not correlate with her symptoms.  Repeat CT head was done today which did not show any discernible loss of gray-white matter differentiation in the region of the occluded right MCA branch.      Patient is on warfarin for Afib but had trouble getting a refill through her pharmacy, so she had to change how she was taking the medication.  She was supposed to be on 5mg daily but was taking 7.5 every other day until she got a refill.    Patient is currently only complaining of headaches that resolve with tylenol.  Patient is concerned with the occlusion seen on CT.  Patient states both her mom and aunt had strokes and aneurysms around her age.  Her mother passed away from the stroke.    ROS:   All pertinent positive symptoms are included in history of present illness.    All other systems have been reviewed and are negative and noncontributory to this patient's current ailments.    Current Outpatient Medications   Medication Sig Dispense Refill    albuterol 90 mcg/actuation inhaler Inhale 2 puffs every 6 hours if needed for wheezing or shortness of breath.      clopidogrel (Plavix) 75 mg tablet Take 1 tablet (75 mg) by mouth once daily.      dapagliflozin propanediol (Farxiga) 10  "mg Take 1 tablet (10 mg) by mouth once daily. 90 tablet 3    dulaglutide (Trulicity) 1.5 mg/0.5 mL pen injector injection Inject 1.5 mg under the skin 1 (one) time per week. sundays      DULoxetine (Cymbalta) 30 mg DR capsule Take 1 capsule (30 mg) by mouth 2 times a day.      enoxaparin (Lovenox) 40 mg/0.4 mL syringe One injection twice daily every 12 hours Continue until directed by coumadin clinic with therapeutic INR. 20 each 0    furosemide (Lasix) 40 mg tablet Take 1 tablet (40 mg) by mouth once daily as needed (swelling).      gabapentin (Neurontin) 100 mg capsule Take 2 capsules (200 mg) by mouth 2 times a day.      metoprolol succinate XL (Toprol-XL) 200 mg 24 hr tablet Take 1 tablet (200 mg) by mouth once daily.      montelukast (Singulair) 10 mg tablet Take 1 tablet (10 mg) by mouth once daily.      rosuvastatin (Crestor) 20 mg tablet Take 1 tablet (20 mg) by mouth once daily. 90 tablet 3    sacubitriL-valsartan (Entresto)  mg tablet Take 1 tablet by mouth 2 times a day. 180 tablet 3    sertraline (Zoloft) 100 mg tablet Take 1 tablet (100 mg) by mouth once daily.      spironolactone (Aldactone) 25 mg tablet Take 0.5 tablets (12.5 mg) by mouth once daily. 45 tablet 3    warfarin (Coumadin) 5 mg tablet Take 1 tablet (5 mg) by mouth once daily.      desipramine (Norpramin) 10 mg tablet Take 1 tablet (10 mg) by mouth once daily. (Patient not taking: Reported on 2/5/2025)       No current facility-administered medications for this visit.       OBJECTIVE  Visit Vitals  BP 98/66 (BP Location: Right arm, Patient Position: Sitting, BP Cuff Size: Large adult)   Pulse 100   Ht 1.626 m (5' 4\")   Wt 84.8 kg (187 lb)   BMI 32.10 kg/m²   OB Status Hysterectomy   Smoking Status Former   BSA 1.96 m²        Neurological Exam  Mental Status  Awake, alert and oriented to person, place and time. Speech is normal. Language is fluent with no aphasia. Attention and concentration are normal.    Physical Exam  Psychiatric:   "       Speech: Speech normal.       I personally reviewed laboratory, radiographic, and medical studies which were pertinent for nothing.      Assessment/Plan       Jayde Daugherty is a 45yo female presenting to the clinic to establish care after a hospital admission for cute Ischemic Right MCA Stroke.  Would recommend to address all your risk factors for further stroke including blood pressure, Afib, Diabetes, Dyslipidemia, Heart Failure and History of Myocardial infarction.  Recommend working as much as you can to your capability.  Recommend 150 minutes of aerobic exercise per week and implementing the mediterranean diet.  Continue to take your current medicine as prescribed and follow up with your other providers.  We discussed and ordered the MRI that is compatible with your pacemaker.  We will call you regarding the results of the MRI.     Martir Vasquez, OMS IV  [facility]

## 2025-02-05 NOTE — PATIENT INSTRUCTIONS
Jayde Daugherty is a 45yo female presenting to the clinic to establish care after a hospital admission for cute Ischemic Right MCA Stroke.  Would recommend to address all your risk factors for further stroke including blood pressure, Afib, Diabetes, Dyslipidemia, Heart Failure and History of Myocardial infarction.  Recommend working as much as you can to your capability.  Recommend 150 minutes of aerobic exercise per week and implementing the mediterranean diet.  Continue to take your current medicine as prescribed and follow up with your other providers.  We discussed and ordered the MRI that is compatible with your pacemaker.  We will call you regarding the results of the MRI.

## 2025-02-07 ENCOUNTER — ANTICOAGULATION - WARFARIN VISIT (OUTPATIENT)
Dept: CARDIOLOGY | Facility: CLINIC | Age: 45
End: 2025-02-07
Payer: MEDICAID

## 2025-02-07 DIAGNOSIS — I48.0 PAROXYSMAL ATRIAL FIBRILLATION (MULTI): Primary | ICD-10-CM

## 2025-02-07 LAB
POC INR: 2.8
POC PROTHROMBIN TIME: NORMAL

## 2025-02-07 PROCEDURE — 85610 PROTHROMBIN TIME: CPT | Mod: QW

## 2025-02-07 PROCEDURE — 99211 OFF/OP EST MAY X REQ PHY/QHP: CPT

## 2025-02-07 NOTE — PROGRESS NOTES
Patient identification verified with 2 identifiers.    Location: [facility] at [facility] - [address] [address] [phone] option #1     Referring Physician: Dr. Migue Bonds  Enrollment/ Re-enrollment date: 25   INR Goal: 2.0-3.0  INR monitoring is per [facility] protocol.  Anticoagulation Medication: warfarin  Indication: Atrial Fibrillation/Atrial Flutter    Subjective   Bleeding signs/symptoms: No    Bruising: No   Major bleeding event: No  Thrombosis signs/symptoms: No  Thromboembolic event: No  Missed doses: No  Extra doses: No  Medication changes: No  Dietary changes: No  Change in health: No  Change in activity: No  Alcohol: No  Other concerns: No    Upcoming Procedures:  Does the Patient Have any upcoming procedures that require interruption in anticoagulation therapy? no  Does the patient require bridging? no      Anticoagulation Summary  As of 2025      INR goal:  2.0-3.0   TTR:  51.0% (1.4 y)   INR used for dosin.80 (2025)   Weekly warfarin total:  35 mg               Assessment/Plan   Therapeutic     1. New dose: no change    2. Next INR:  1 day.  IF THERAPEUTIC CAN STOP LOVENOX.       Education provided to patient during the visit:  Patient instructed to call in interim with questions, concerns and changes.   Patient educated on interactions between medications and warfarin.   Patient educated on dietary consistency in vitamin k consumption.   Patient educated on affects of alcohol consumption while taking warfarin.   Patient educated on signs of bleeding/clotting.

## 2025-02-08 ENCOUNTER — ANTICOAGULATION - WARFARIN VISIT (OUTPATIENT)
Dept: CARDIOLOGY | Facility: CLINIC | Age: 45
End: 2025-02-08
Payer: MEDICAID

## 2025-02-08 DIAGNOSIS — I48.0 PAROXYSMAL ATRIAL FIBRILLATION (MULTI): Primary | ICD-10-CM

## 2025-02-08 LAB
POC INR: 3.2
POC PROTHROMBIN TIME: NORMAL

## 2025-02-08 PROCEDURE — 85610 PROTHROMBIN TIME: CPT | Mod: QW

## 2025-02-08 PROCEDURE — 99211 OFF/OP EST MAY X REQ PHY/QHP: CPT

## 2025-02-08 NOTE — PROGRESS NOTES
Patient identification verified with 2 identifiers.    Location: [facility] at [facility] - [address] [address] [phone] option #1     Referring Physician: Dr. Migue Bonds  Enrollment/ Re-enrollment date: 2/26/25   INR Goal: 2.0-3.0  INR monitoring is per [facility] protocol.  Anticoagulation Medication: warfarin  Indication: Atrial Fibrillation/Atrial Flutter    Subjective   Bleeding signs/symptoms: No    Bruising: No   Major bleeding event: No  Thrombosis signs/symptoms: No  Thromboembolic event: No  Missed doses: No  Extra doses: No  Medication changes: No  Dietary changes: No  Change in health: No  Change in activity: No  Alcohol: No  Other concerns: No    Upcoming Procedures:  Does the Patient Have any upcoming procedures that require interruption in anticoagulation therapy? no  Does the patient require bridging? no      Anticoagulation Summary  As of 2/8/2025      INR goal:  2.0-3.0   TTR:  51.0% (1.4 y)   INR used for dosing:  3.20 (2/8/2025)   Weekly warfarin total:  35 mg               Assessment/Plan   Supra therapeutic  Maintain weekly dose  RTC in 6 days  PT MAY STOP LOVENOX INJECTIONS    Education provided to patient during the visit:  Patient instructed to call in interim with questions, concerns and changes.   Patient educated on interactions between medications and warfarin.   Patient educated on dietary consistency in vitamin k consumption.   Patient educated on affects of alcohol consumption while taking warfarin.   Patient educated on signs of bleeding/clotting.

## 2025-02-12 DIAGNOSIS — I25.5 ISCHEMIC CARDIOMYOPATHY: ICD-10-CM

## 2025-02-14 ENCOUNTER — APPOINTMENT (OUTPATIENT)
Dept: CARDIOLOGY | Facility: CLINIC | Age: 45
End: 2025-02-14
Payer: MEDICAID

## 2025-02-14 ENCOUNTER — ANTICOAGULATION - WARFARIN VISIT (OUTPATIENT)
Dept: CARDIOLOGY | Facility: CLINIC | Age: 45
End: 2025-02-14
Payer: MEDICAID

## 2025-02-14 DIAGNOSIS — I48.0 PAROXYSMAL ATRIAL FIBRILLATION (MULTI): Primary | ICD-10-CM

## 2025-02-19 ENCOUNTER — TELEPHONE (OUTPATIENT)
Dept: CARDIOLOGY | Facility: CLINIC | Age: 45
End: 2025-02-19
Payer: MEDICAID

## 2025-02-19 ENCOUNTER — APPOINTMENT (OUTPATIENT)
Dept: CARDIOLOGY | Facility: CLINIC | Age: 45
End: 2025-02-19
Payer: MEDICAID

## 2025-02-21 ENCOUNTER — ANTICOAGULATION - WARFARIN VISIT (OUTPATIENT)
Dept: CARDIOLOGY | Facility: CLINIC | Age: 45
End: 2025-02-21
Payer: MEDICAID

## 2025-02-21 DIAGNOSIS — I48.0 PAROXYSMAL ATRIAL FIBRILLATION (MULTI): Primary | ICD-10-CM

## 2025-02-24 ENCOUNTER — HOSPITAL ENCOUNTER (OUTPATIENT)
Dept: CARDIOLOGY | Facility: HOSPITAL | Age: 45
Discharge: HOME | End: 2025-02-24
Payer: MEDICAID

## 2025-02-24 DIAGNOSIS — Z95.810 PRESENCE OF AUTOMATIC (IMPLANTABLE) CARDIAC DEFIBRILLATOR: ICD-10-CM

## 2025-02-24 PROCEDURE — 93282 PRGRMG EVAL IMPLANTABLE DFB: CPT

## 2025-02-24 PROCEDURE — 93282 PRGRMG EVAL IMPLANTABLE DFB: CPT | Performed by: INTERNAL MEDICINE

## 2025-02-25 ENCOUNTER — ANTICOAGULATION - WARFARIN VISIT (OUTPATIENT)
Dept: CARDIOLOGY | Facility: CLINIC | Age: 45
End: 2025-02-25
Payer: MEDICAID

## 2025-02-25 DIAGNOSIS — I48.0 PAROXYSMAL ATRIAL FIBRILLATION (MULTI): Primary | ICD-10-CM

## 2025-02-25 NOTE — TELEPHONE ENCOUNTER
I spoke with pt who was discharged from [facility] yesterday.  She agreed to rtc here tomorrow at 3pm.  She will bring list of new meds.  She was on lovenox while in hospital.

## 2025-02-26 ENCOUNTER — ANTICOAGULATION - WARFARIN VISIT (OUTPATIENT)
Dept: CARDIOLOGY | Facility: CLINIC | Age: 45
End: 2025-02-26
Payer: MEDICAID

## 2025-02-26 DIAGNOSIS — I48.0 PAROXYSMAL ATRIAL FIBRILLATION (MULTI): Primary | ICD-10-CM

## 2025-02-26 LAB
POC INR: 2.5
POC PROTHROMBIN TIME: NORMAL

## 2025-02-26 PROCEDURE — 85610 PROTHROMBIN TIME: CPT | Mod: QW

## 2025-02-26 PROCEDURE — 99211 OFF/OP EST MAY X REQ PHY/QHP: CPT

## 2025-02-26 NOTE — PROGRESS NOTES
Patient identification verified with 2 identifiers.    Location: [facility] at [facility] - [address] [address] [phone] option #1     Referring Physician: Dr. Migue Bonds  Enrollment/ Re-enrollment date: 25   INR Goal: 2.0-3.0  INR monitoring is per [facility] protocol.  Anticoagulation Medication: warfarin  Indication: Atrial Fibrillation/Atrial Flutter    Subjective   Bleeding signs/symptoms: No    Bruising: No   Major bleeding event: No  Thrombosis signs/symptoms: No  Thromboembolic event: No  Missed doses: No  Extra doses: No  Medication changes: Yes  will start Topomax  Dietary changes: No  Change in health: No  Change in activity: No  Alcohol: No  Other concerns: No    Upcoming Procedures:  Does the Patient Have any upcoming procedures that require interruption in anticoagulation therapy? no  Does the patient require bridging? no      Anticoagulation Summary  As of 2025      INR goal:  2.0-3.0   TTR:  51.7% (1.4 y)   INR used for dosin.50 (2025)   Weekly warfarin total:  35 mg               Assessment/Plan   Therapeutic  Maintain TWD  RTC in 10 days pt needs a Saturday appointment    Education provided to patient during the visit:  Patient instructed to call in interim with questions, concerns and changes.   Patient educated on interactions between medications and warfarin.   Patient educated on dietary consistency in vitamin k consumption.   Patient educated on affects of alcohol consumption while taking warfarin.   Patient educated on signs of bleeding/clotting.

## 2025-03-08 ENCOUNTER — ANTICOAGULATION - WARFARIN VISIT (OUTPATIENT)
Dept: CARDIOLOGY | Facility: CLINIC | Age: 45
End: 2025-03-08
Payer: MEDICAID

## 2025-03-08 DIAGNOSIS — I48.0 PAROXYSMAL ATRIAL FIBRILLATION (MULTI): Primary | ICD-10-CM

## 2025-03-08 LAB
POC INR: 2.5
POC PROTHROMBIN TIME: NORMAL

## 2025-03-08 PROCEDURE — 85610 PROTHROMBIN TIME: CPT | Mod: QW

## 2025-03-08 PROCEDURE — 99211 OFF/OP EST MAY X REQ PHY/QHP: CPT

## 2025-03-08 NOTE — PROGRESS NOTES
Patient identification verified with 2 identifiers.    Location: [facility] at [facility] - [address] [address] [phone] option #1     Referring Physician: Dr. Migue Bodns  Enrollment/ Re-enrollment date: 25 SENT 3/8/24  INR Goal: 2.0-3.0  INR monitoring is per [facility] protocol.  Anticoagulation Medication: warfarin  Indication: Atrial Fibrillation/Atrial Flutter    Subjective   Bleeding signs/symptoms: No    Bruising: No   Major bleeding event: No  Thrombosis signs/symptoms: No  Thromboembolic event: No  Missed doses: No  Extra doses: No  Medication changes: No  Dietary changes: No  Change in health: No  Change in activity: No  Alcohol: No  Other concerns: No    Upcoming Procedures:  Does the Patient Have any upcoming procedures that require interruption in anticoagulation therapy? no  Does the patient require bridging? no      Anticoagulation Summary  As of 3/8/2025      INR goal:  2.0-3.0   TTR:  52.7% (1.4 y)   INR used for dosin.50 (3/8/2025)   Weekly warfarin total:  35 mg               Assessment/Plan   Therapeutic  Maintain TWD  RTC in 14 days pt needs a Saturday appointment    Education provided to patient during the visit:  Patient instructed to call in interim with questions, concerns and changes.   Patient educated on interactions between medications and warfarin.   Patient educated on dietary consistency in vitamin k consumption.   Patient educated on affects of alcohol consumption while taking warfarin.   Patient educated on signs of bleeding/clotting.

## 2025-03-19 ENCOUNTER — APPOINTMENT (OUTPATIENT)
Dept: BEHAVIORAL HEALTH | Facility: CLINIC | Age: 45
End: 2025-03-19
Payer: MEDICAID

## 2025-03-19 DIAGNOSIS — F33.1 MODERATE EPISODE OF RECURRENT MAJOR DEPRESSIVE DISORDER: ICD-10-CM

## 2025-03-19 DIAGNOSIS — F41.1 GAD (GENERALIZED ANXIETY DISORDER): Primary | ICD-10-CM

## 2025-03-19 PROCEDURE — 3050F LDL-C >= 130 MG/DL: CPT | Performed by: REGISTERED NURSE

## 2025-03-19 PROCEDURE — 99204 OFFICE O/P NEW MOD 45 MIN: CPT | Performed by: REGISTERED NURSE

## 2025-03-19 PROCEDURE — 3052F HG A1C>EQUAL 8.0%<EQUAL 9.0%: CPT | Performed by: REGISTERED NURSE

## 2025-03-19 ASSESSMENT — PATIENT HEALTH QUESTIONNAIRE - PHQ9
SUM OF ALL RESPONSES TO PHQ QUESTIONS 1-9: 17
5. POOR APPETITE OR OVEREATING: NOT AT ALL
1. LITTLE INTEREST OR PLEASURE IN DOING THINGS: NEARLY EVERY DAY
6. FEELING BAD ABOUT YOURSELF - OR THAT YOU ARE A FAILURE OR HAVE LET YOURSELF OR YOUR FAMILY DOWN: NOT AT ALL
9. THOUGHTS THAT YOU WOULD BE BETTER OFF DEAD, OR OF HURTING YOURSELF: NOT AT ALL
3. TROUBLE FALLING OR STAYING ASLEEP: NEARLY EVERY DAY
10. IF YOU CHECKED OFF ANY PROBLEMS, HOW DIFFICULT HAVE THESE PROBLEMS MADE IT FOR YOU TO DO YOUR WORK, TAKE CARE OF THINGS AT HOME, OR GET ALONG WITH OTHER PEOPLE: SOMEWHAT DIFFICULT
2. FEELING DOWN, DEPRESSED OR HOPELESS: MORE THAN HALF THE DAYS
8. MOVING OR SPEAKING SO SLOWLY THAT OTHER PEOPLE COULD HAVE NOTICED. OR THE OPPOSITE, BEING SO FIGETY OR RESTLESS THAT YOU HAVE BEEN MOVING AROUND A LOT MORE THAN USUAL: NEARLY EVERY DAY
4. FEELING TIRED OR HAVING LITTLE ENERGY: NEARLY EVERY DAY
7. TROUBLE CONCENTRATING ON THINGS, SUCH AS READING THE NEWSPAPER OR WATCHING TELEVISION: NEARLY EVERY DAY
SUM OF ALL RESPONSES TO PHQ9 QUESTIONS 1 & 2: 5

## 2025-03-19 ASSESSMENT — ANXIETY QUESTIONNAIRES
2. NOT BEING ABLE TO STOP OR CONTROL WORRYING: NOT AT ALL
1. FEELING NERVOUS, ANXIOUS, OR ON EDGE: MORE THAN HALF THE DAYS
IF YOU CHECKED OFF ANY PROBLEMS ON THIS QUESTIONNAIRE, HOW DIFFICULT HAVE THESE PROBLEMS MADE IT FOR YOU TO DO YOUR WORK, TAKE CARE OF THINGS AT HOME, OR GET ALONG WITH OTHER PEOPLE: NOT DIFFICULT AT ALL
GAD7 TOTAL SCORE: 3
6. BECOMING EASILY ANNOYED OR IRRITABLE: NOT AT ALL
3. WORRYING TOO MUCH ABOUT DIFFERENT THINGS: NOT AT ALL
7. FEELING AFRAID AS IF SOMETHING AWFUL MIGHT HAPPEN: NOT AT ALL
5. BEING SO RESTLESS THAT IT IS HARD TO SIT STILL: NOT AT ALL
4. TROUBLE RELAXING: SEVERAL DAYS

## 2025-03-19 NOTE — PROGRESS NOTES
"HPI  Jayde Daugherty is a 45 y.o. female patient with a chief complaint of   Chief Complaint   Patient presents with    Anxiety    Depression    presenting to outpatient treatment for a scheduled psych outpatient psychiatric evaluation.    Jayde explains that symptoms of anxiety and depression began in 2012.   Patient explains that during this time \"I was having issues with my ex- that was increasing anxiety and depression. 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 Jayde explains that she was self-coping with symptoms until 3 years ago when she was prescribed antidepressants. Patient recently underwent severe medical problems including heart attack, CHF, and stroke which has exacerbated symptoms.   The duration of symptoms occurred for 12 years.    Aggravating factors of anxiety and depression are life's stressors  Relieving factors of anxiety and depression are decorating   Patient ranks the severity of anxiety using the OMAR 7 scale with a rating of 3 for minimal anxiety symptoms. Patient ranks the severity of depression using the PHQ 9 scale with a rating of 17 for moderately severe depressive symptoms. Patient does explain that current psychiatric medication regimen has been somewhat successful in the management of anxiety and depressive symptoms.     NOTE: Symptom scale is rated where 0 = no symptoms at all, and 10 = symptoms so severe that pt is an imminent danger to themselves or others and requires hospitalization.    Anxiety and Depression remain(s) present more days than not, which has remained unchanged over the past few weeks. Jayde Daugherty rates the severity of psych symptoms as a 7/10, noting symptom improvement with decorating and worsening of symptoms with life's stressors.    Psychiatric Review Of Systems:  -Mood:  Depressive Symptoms: concentration, energy, guilt, helpless, hopeless, and interest  Manic Symptoms: negative  Anxiety Symptoms: General Anxiety Disorder " (OMAR)OMAR Behaviors: difficult to control worry, excessive anxiety/worry, and difficulty concentrating  Psychotic Symptoms: negative  Other Symptoms:  -Sleep/Energy/Motivation: Sleep is good. Energy and motivation is poor  -Appetite/Weight Changes: Appetite is good. No weight changes  -Psychosis: denies  -SI/HI: denies      HISTORY  PSYCH HISTORY  -Psych Hx: OMAR, MDD    -Psych Hospitalization Hx: denies  -Suicide Attempt Hx: denies  -Self-Harm/Violence Hx: denies  -Current psych meds: Cymbalta Sertraline  -Psych Med Hx: n/a    SUBSTANCE USE HISTORY  -Substance Use Hx: denies  -Alcohol: 3-4 glasses wine weekly  -Tobacco: denies  -Caffeine: denies  -Substance Abuse Treatment Hx: denies    FAMILY HISTORY  -Family Psych Hx: sister (Bipolar)  -Family Suicide Hx: denies  -Family Substance Abuse Hx: denies    SOCIAL HISTORY  -Supports: daughter  -Housing: lives in house with children  -Income: RN at [employer]  -Education: Associate's Degree  -Legal: denies  -Abuse Hx: past history    Past Medical History:   Diagnosis Date    Myocardial infarction (Multi)     x 2 with significant apical involvement    Personal history of nicotine dependence 06/10/2019    Former cigarette smoker    Personal history of other diseases of the circulatory system 04/02/2014    History of atrial fibrillation    Personal history of other diseases of the respiratory system 12/29/2022    History of acute bronchitis    Systemic lupus erythematosus, unspecified 06/30/2013    Systemic lupus erythematosus       -PCP: Arminda Thompson MD  -Pt reports currently is not pregnant, and currently is not sexually active, does not use birth control. LMP: no menstrual due to hysterectomy  -TBI/head trauma/LOC/seizure hx: denies    REVIEW OF SYSTEMS  Review of Systems   All other systems reviewed and are negative.    Objective   Physical Exam  Psychiatric:         Attention and Perception: Attention and perception normal.         Mood and Affect: Mood and  affect normal.         Speech: Speech normal.         Behavior: Behavior normal. Behavior is cooperative.         Thought Content: Thought content normal.         Cognition and Memory: Cognition and memory normal.         Judgment: Judgment normal.       MEDICAL-DECISION MAKING  Continue medication regimen as prescribed by PCP. Patient acknowledges that she drinks alcohol 4 days per week in addition to psychiatric medications prescribed by PCP. Patient educated on the side effects of taking medications with alcohol. Patient states that she will decrease alcohol intake and refuses medication and ARS services for alcohol dependence.   Patient educated and verbalized understanding on benefits, risks, and side effects of all psychiatric medications. Outside referrals for psychotherapy sent to patient via [facility].         Psych med regimen as follows:   1. Cymbalta 30mg PO BID   2. Sertraline 100mg PO Daily    IMPRESSION  - OMAR  - MDD, moderate severity      SI/HI ASSESSMENT  -Risk Assessment: The pt is currently a low acute risk of suicide and self-harm due to no past suicide attempt(s) and not currently endorsing thoughts of suicide. The pt is currently a low acute risk of violence and harm to others due to no past history of violence and not currently threatening others.  -Suicidal Risk Factors: current psychiatric illness, life crisis/shame/despair, and substance abuse  -Violence Risk Factors: none  -Protective Factors: strong coping skills, positive family relationships, and employment  -Plan to Reduce Risk: Establish medication regimen, outpatient follow-up care, and increase coping skills   Denied current suicidal ideation or thoughts of harm to self, denied homicidal ideation or thoughts of harm to others. No delusional thinking elicited. No perseverations or obsessions identified.       PLAN  -Continue Medications as directed.  -Follow-up with PCP for medication management.  -Risks/benefits/assessment of  medication interventions discussed with pt; pt agreeable to plan. Will continue to monitor for symptoms mgmt and SEs and adjust plan as needed.  -MI to increase coping skills/behavior regulation.  -Safety plan reviewed.  -Call  Psychiatry at [phone] with issues.  -For [facility] residents, [facility] is a 24/7 hotline you can call for assistance at [phone] [phone]. Please call 911 or go to your closest Emergency Room if you feel worse. This includes thoughts of hurting yourself or anyone else, or having other troubles such as hearing voices, seeing visions, or having new and scary thoughts about the people around you.    Reviewed OARRS on [03/19/25] by [Jun Bowser, APRN-CNP] -OARRS has been reviewed and is consistent with prescribed medications, Considered the risks of abuse, dependence, addiction and diversion, Medication is felt to be clinically appropriate based on documented diagnosis.    Anticoagulation - Warfarin Visit on 03/08/2025   Component Date Value    POC INR 03/08/2025 2.50    Anticoagulation - Warfarin Visit on 02/26/2025   Component Date Value    POC INR 02/26/2025 2.50    Anticoagulation - Warfarin Visit on 02/08/2025   Component Date Value    POC INR 02/08/2025 3.20    Anticoagulation - Warfarin Visit on 02/07/2025   Component Date Value    POC INR 02/07/2025 2.80    Anticoagulation - Warfarin Visit on 02/03/2025   Component Date Value    POC INR 02/03/2025 1.50    Admission on 01/31/2025, Discharged on 02/01/2025   Component Date Value    Cholesterol 01/31/2025 231 (H)     HDL-Cholesterol 01/31/2025 45.5     Cholesterol/HDL Ratio 01/31/2025 5.1     LDL Calculated 01/31/2025 148 (H)     VLDL 01/31/2025 38     Triglycerides 01/31/2025 190 (H)     Non HDL Cholesterol 01/31/2025 186 (H)     BNP 01/31/2025 211 (H)     Anticardiolipin IgA 02/01/2025 5.4     Anticardiolipin IgG 02/01/2025 <1.6     Anticardiolipin IgM 02/01/2025 0.6     Beta-2 Glyco 1 IgG 02/01/2025 <1.4     Beta-2  Glyco 1 IgA 02/01/2025 4.1     Beta 2 Glyco 1 IgM 02/01/2025 0.3     DRVVT Screen 02/01/2025 1.19     DRVVT Confirmation 02/01/2025 1.28     DRVVT Test Ratio 02/01/2025 0.93     SCT Screen 02/01/2025 0.88     SCT Confirmation 02/01/2025 1.22     SCT Test Ratio 02/01/2025 0.72     Lupus Anticoagulant Inte* 02/01/2025                      Value:No evidence of lupus anticoagulant in these assays (DRVVT and Silica Clotting Time (SCT)). Assay interferences may occur in the presence of factor deficiency/inhibitor and/or anticoagulants. For patients on anti-Vitamin K therapy, repeating DRVVT testing might be indicated when the patient is off anti-vitamin K therapy. The DRVVT assay contains a heparin neutralizer up to 1.0 U/mL. Higher concentrations of heparin may cause interferences. SCT results are not affected by UF heparin up to 0.5 U/mL and LMW Heparin up to 1.0 U/mL. Higher concentrations of heparin may cause interferences. Correlation with clinical findings and clinical history is necessary to assess significance of results in an individual patient.    Lipoprotein (a) 02/01/2025 116 (H)     Myeloperoxidase (MPO) Ab* 01/31/2025 0     Serine Proteinase 3 (PR3* 01/31/2025 1     ANCA IFA Titer 01/31/2025 <1:20     ANCA IFA Pattern 01/31/2025 None Detected     BENY 01/31/2025 Positive (A)     BENY Pattern 01/31/2025 Speckled     BENY Titer 01/31/2025 1:160     LV Biplane EF 02/01/2025 23     LVOT diam 02/01/2025 1.94     MV E/A ratio 02/01/2025 1.05     Tricuspid annular plane * 02/01/2025 1.2     LA vol index A/L 02/01/2025 23.2     LV EF 02/01/2025 23     RV free wall pk S' 02/01/2025 5.89     RVSP 02/01/2025 18.4     LVIDd 02/01/2025 6.35     LV A4C EF 02/01/2025 26.0     POCT Glucose 01/31/2025 131 (H)     Magnesium 02/01/2025 1.90     WBC 02/01/2025 2.5 (L)     nRBC 02/01/2025 0.0     RBC 02/01/2025 4.44     Hemoglobin 02/01/2025 13.3     Hematocrit 02/01/2025 41.2     MCV 02/01/2025 93     MCH 02/01/2025 30.0     MCHC  02/01/2025 32.3     RDW 02/01/2025 12.1     Platelets 02/01/2025 143 (L)     Hemoglobin A1C 01/31/2025 8.9 (H)     Estimated Average Glucose 01/31/2025 209     C3 Complement 01/31/2025 146     C4 Complement 01/31/2025 31     Anti-DNA (DS) 01/31/2025 1.0     WBC 01/31/2025 2.7 (L)     nRBC 01/31/2025 0.0     RBC 01/31/2025 4.52     Hemoglobin 01/31/2025 13.6     Hematocrit 01/31/2025 41.2     MCV 01/31/2025 91     MCH 01/31/2025 30.1     MCHC 01/31/2025 33.0     RDW 01/31/2025 12.0     Platelets 01/31/2025 148 (L)     Neutrophils % 01/31/2025 23.5     Immature Granulocytes %,* 01/31/2025 0.0     Lymphocytes % 01/31/2025 52.9     Monocytes % 01/31/2025 16.2     Eosinophils % 01/31/2025 6.3     Basophils % 01/31/2025 1.1     Neutrophils Absolute 01/31/2025 0.64 (L)     Immature Granulocytes Ab* 01/31/2025 0.00     Lymphocytes Absolute 01/31/2025 1.44     Monocytes Absolute 01/31/2025 0.44     Eosinophils Absolute 01/31/2025 0.17     Basophils Absolute 01/31/2025 0.03     Glucose 02/01/2025 130 (H)     Sodium 02/01/2025 137     Potassium 02/01/2025 3.8     Chloride 02/01/2025 102     Bicarbonate 02/01/2025 28     Anion Gap 02/01/2025 11     Urea Nitrogen 02/01/2025 10     Creatinine 02/01/2025 0.77     eGFR 02/01/2025 >90     Calcium 02/01/2025 8.9     Albumin 02/01/2025 3.6     Alkaline Phosphatase 02/01/2025 39     Total Protein 02/01/2025 7.0     AST 02/01/2025 14     Bilirubin, Total 02/01/2025 0.4     ALT 02/01/2025 12     Phosphorus 02/01/2025 3.6     Sedimentation Rate 01/31/2025 32 (H)     C-Reactive Protein 01/31/2025 <0.10     Magnesium 01/31/2025 1.88     Heparin Unfractionated 02/01/2025 0.5     Retic % 01/31/2025 1.5     Retic Absolute 01/31/2025 0.069     Reticulocyte Hemoglobin 01/31/2025 34     Immature Retic fraction 01/31/2025 5.7     Pathologist Review-CBC D* 01/31/2025                      Value:Slide reviewed.  Leukopenia with absolute neutropenia, moderate.  Platelets are minimally  decreased.  Morphology of all blood forms are completely within normal limits.  Note that leukopenia is most commonly caused by infection or medication.  Clinical correlation recommended.    Protime 02/01/2025 19.4 (H)     INR 02/01/2025 1.7 (H)     aPTT 02/01/2025 77 (H)     POCT Glucose 02/01/2025 145 (H)     Heparin Unfractionated 02/01/2025 0.7     POCT Glucose 02/01/2025 156 (H)     POCT Glucose 02/01/2025 82     Heparin Unfractionated 02/01/2025 0.5     Anti-SM 01/31/2025 <0.2     Anti-RNP 01/31/2025 <0.2     Anti-SM/RNP 01/31/2025 <0.2     Anti-SSA 01/31/2025 >8.0 (H)     Anti-SSB 01/31/2025 <0.2     Anti-SCL-70 01/31/2025 <0.2     Anti-TELLO-1 IgG 01/31/2025 <0.2     Anti-Chromatin 01/31/2025 <0.2     Anti-Centromere 01/31/2025 <0.2     ANTI-RIBOSOMAL P 01/31/2025 0.2     Anti-DNA (DS) 01/31/2025 1.0    Admission on 01/31/2025, Discharged on 01/31/2025   Component Date Value    WBC 01/31/2025 2.4 (L)     nRBC 01/31/2025 0.0     RBC 01/31/2025 4.61     Hemoglobin 01/31/2025 14.2     Hematocrit 01/31/2025 41.6     MCV 01/31/2025 90     MCH 01/31/2025 30.8     MCHC 01/31/2025 34.1     RDW 01/31/2025 12.1     Platelets 01/31/2025 163     Neutrophils % 01/31/2025 16.5     Immature Granulocytes %,* 01/31/2025 0.0     Lymphocytes % 01/31/2025 59.7     Monocytes % 01/31/2025 15.7     Eosinophils % 01/31/2025 6.8     Basophils % 01/31/2025 1.3     Neutrophils Absolute 01/31/2025 0.39 (L)     Immature Granulocytes Ab* 01/31/2025 0.00     Lymphocytes Absolute 01/31/2025 1.41     Monocytes Absolute 01/31/2025 0.37     Eosinophils Absolute 01/31/2025 0.16     Basophils Absolute 01/31/2025 0.03     Glucose 01/31/2025 125 (H)     Sodium 01/31/2025 132 (L)     Potassium 01/31/2025 4.3     Chloride 01/31/2025 102     Bicarbonate 01/31/2025 22     Anion Gap 01/31/2025 12     Urea Nitrogen 01/31/2025 8     Creatinine 01/31/2025 0.59     eGFR 01/31/2025 >90     Calcium 01/31/2025 8.6     Albumin 01/31/2025 3.9     Alkaline  Phosphatase 01/31/2025 44     Total Protein 01/31/2025 7.5     AST 01/31/2025 19     Bilirubin, Total 01/31/2025 0.4     ALT 01/31/2025 11     Troponin I, High Sensiti* 01/31/2025 7     Protime 01/31/2025 16.7 (H)     INR 01/31/2025 1.6 (H)     aPTT 01/31/2025 24.3     Ventricular Rate 01/31/2025 86     Atrial Rate 01/31/2025 86     NJ Interval 01/31/2025 156     QRS Duration 01/31/2025 104     QT Interval 01/31/2025 398     QTC Calculation(Bazett) 01/31/2025 476     P Axis 01/31/2025 51     R Axis 01/31/2025 -68     T Masury 01/31/2025 63     QRS Count 01/31/2025 14     Q Onset 01/31/2025 215     P Onset 01/31/2025 137     P Offset 01/31/2025 193     T Offset 01/31/2025 414     QTC Fredericia 01/31/2025 449     HCG, Beta-Quantitative 01/31/2025 <2     Color, Urine 01/31/2025 Light-Yellow     Appearance, Urine 01/31/2025 Clear     Specific Gravity, Urine 01/31/2025 >1.050 (N)     pH, Urine 01/31/2025 5.5     Protein, Urine 01/31/2025 NEGATIVE     Glucose, Urine 01/31/2025 50 (TRACE) (A)     Blood, Urine 01/31/2025 NEGATIVE     Ketones, Urine 01/31/2025 NEGATIVE     Bilirubin, Urine 01/31/2025 NEGATIVE     Urobilinogen, Urine 01/31/2025 Normal     Nitrite, Urine 01/31/2025 NEGATIVE     Leukocyte Esterase, Urine 01/31/2025 NEGATIVE     Extra Tube 01/31/2025 Hold for add-ons.     RBC Morphology 01/31/2025 See Below     Teardrop Cells 01/31/2025 Few    Admission on 01/13/2025, Discharged on 01/13/2025   Component Date Value    Ventricular Rate 01/13/2025 85     Atrial Rate 01/13/2025 85     NJ Interval 01/13/2025 156     QRS Duration 01/13/2025 106     QT Interval 01/13/2025 402     QTC Calculation(Bazett) 01/13/2025 478     P Axis 01/13/2025 38     R Axis 01/13/2025 -49     T Axis 01/13/2025 22     QRS Count 01/13/2025 14     Q Onset 01/13/2025 216     P Onset 01/13/2025 138     P Offset 01/13/2025 195     T Offset 01/13/2025 417     QTC Fredericia 01/13/2025 451     WBC 01/13/2025 4.1 (L)     nRBC 01/13/2025 0.0     RBC  01/13/2025 5.17     Hemoglobin 01/13/2025 15.6     Hematocrit 01/13/2025 46.4 (H)     MCV 01/13/2025 90     MCH 01/13/2025 30.2     MCHC 01/13/2025 33.6     RDW 01/13/2025 11.9     Platelets 01/13/2025 257     Neutrophils % 01/13/2025 45.8     Immature Granulocytes %,* 01/13/2025 0.2     Lymphocytes % 01/13/2025 38.4     Monocytes % 01/13/2025 9.0     Eosinophils % 01/13/2025 5.6     Basophils % 01/13/2025 1.0     Neutrophils Absolute 01/13/2025 1.87     Immature Granulocytes Ab* 01/13/2025 0.01     Lymphocytes Absolute 01/13/2025 1.57     Monocytes Absolute 01/13/2025 0.37     Eosinophils Absolute 01/13/2025 0.23     Basophils Absolute 01/13/2025 0.04     Magnesium 01/13/2025 1.60     Glucose 01/13/2025 219 (H)     Sodium 01/13/2025 137     Potassium 01/13/2025 3.9     Chloride 01/13/2025 99     Bicarbonate 01/13/2025 30     Anion Gap 01/13/2025 12     Urea Nitrogen 01/13/2025 10     Creatinine 01/13/2025 0.76     eGFR 01/13/2025 >90     Calcium 01/13/2025 9.6     Albumin 01/13/2025 4.2     Alkaline Phosphatase 01/13/2025 55     Total Protein 01/13/2025 8.4 (H)     AST 01/13/2025 14     Bilirubin, Total 01/13/2025 0.6     ALT 01/13/2025 14     BNP 01/13/2025 314 (H)     Protime 01/13/2025 18.8 (H)     INR 01/13/2025 1.9 (H)     aPTT 01/13/2025 33.5 (H)     Flu A Result 01/13/2025 Not Detected     Flu B Result 01/13/2025 Not Detected     Coronavirus 2019, PCR 01/13/2025 Not Detected     Troponin I, High Sensiti* 01/13/2025 17 (H)     Troponin I, High Sensiti* 01/13/2025 12    Anticoagulation - Warfarin Visit on 01/11/2025   Component Date Value    POC INR 01/11/2025 3.30    Anticoagulation - Warfarin Visit on 01/03/2025   Component Date Value    POC INR 01/03/2025 2.40    There may be more visits with results that are not included.       Last Urine Drug Screen / ordered today: No  No results found for this or any previous visit (from the past 8760 hours).  N/A          Jun Bowser, JAYCOB-CNP

## 2025-03-22 ENCOUNTER — APPOINTMENT (OUTPATIENT)
Dept: CARDIOLOGY | Facility: CLINIC | Age: 45
End: 2025-03-22
Payer: MEDICAID

## 2025-03-22 ENCOUNTER — ANTICOAGULATION - WARFARIN VISIT (OUTPATIENT)
Dept: CARDIOLOGY | Facility: CLINIC | Age: 45
End: 2025-03-22
Payer: MEDICAID

## 2025-03-22 DIAGNOSIS — I48.0 PAROXYSMAL ATRIAL FIBRILLATION (MULTI): Primary | ICD-10-CM

## 2025-03-24 ENCOUNTER — ANTICOAGULATION - WARFARIN VISIT (OUTPATIENT)
Dept: CARDIOLOGY | Facility: CLINIC | Age: 45
End: 2025-03-24
Payer: MEDICAID

## 2025-03-24 DIAGNOSIS — I48.0 PAROXYSMAL ATRIAL FIBRILLATION (MULTI): Primary | ICD-10-CM

## 2025-03-24 LAB
POC INR: 4
POC PROTHROMBIN TIME: NORMAL

## 2025-03-24 PROCEDURE — 99211 OFF/OP EST MAY X REQ PHY/QHP: CPT

## 2025-03-24 PROCEDURE — 85610 PROTHROMBIN TIME: CPT | Mod: QW

## 2025-03-24 NOTE — PROGRESS NOTES
Patient identification verified with 2 identifiers.    Location: [facility] at [facility] - [address] [address] [phone] option #1     Referring Physician: DR. BRIDGETTE MCDANIEL  Enrollment/ Re-enrollment date: 3/10/2026   INR Goal: 2.0-3.0  INR monitoring is per [facility] protocol.  Anticoagulation Medication: warfarin  Indication: Atrial Fibrillation/Atrial Flutter    Subjective   Bleeding signs/symptoms: No    Bruising: No   Major bleeding event: No  Thrombosis signs/symptoms: No  Thromboembolic event: No  Missed doses: No  Extra doses: No  Medication changes: No  Dietary changes: No  PT HADN'T BEEN EATING AS MANY GREEN VEGETABLES AS USUAL  Change in health: No  Change in activity: No  Alcohol: No  Other concerns: No    Upcoming Procedures:  Does the Patient Have any upcoming procedures that require interruption in anticoagulation therapy? no  Does the patient require bridging? no      Anticoagulation Summary  As of 3/24/2025      INR goal:  2.0-3.0   TTR:  52.1% (1.5 y)   INR used for dosin.00 (3/24/2025)   Weekly warfarin total:  35 mg               Assessment/Plan   Supratherapeutic     1. New dose:  WILL HOLD TODAY AND TOMORROWS DOSE     2. Next INR: 1 week      Education provided to patient during the visit:  Patient instructed to call in interim with questions, concerns and changes.   Patient educated on dietary consistency in vitamin k consumption.   Patient educated on compliance with dosing, follow up appointments, and prescribed plan of care.

## 2025-03-25 ENCOUNTER — CLINICAL SUPPORT (OUTPATIENT)
Dept: CARDIAC REHAB | Facility: HOSPITAL | Age: 45
End: 2025-03-25
Payer: MEDICAID

## 2025-03-25 VITALS
SYSTOLIC BLOOD PRESSURE: 104 MMHG | OXYGEN SATURATION: 98 % | BODY MASS INDEX: 32.33 KG/M2 | HEART RATE: 115 BPM | DIASTOLIC BLOOD PRESSURE: 64 MMHG | WEIGHT: 189.38 LBS | RESPIRATION RATE: 20 BRPM | HEIGHT: 64 IN

## 2025-03-25 DIAGNOSIS — I25.5 ISCHEMIC CARDIOMYOPATHY: ICD-10-CM

## 2025-03-25 DIAGNOSIS — I50.20 HFREF (HEART FAILURE WITH REDUCED EJECTION FRACTION): ICD-10-CM

## 2025-03-25 PROCEDURE — 94621 CARDIOPULM EXERCISE TESTING: CPT | Performed by: INTERNAL MEDICINE

## 2025-03-25 PROCEDURE — 94621 CARDIOPULM EXERCISE TESTING: CPT

## 2025-03-25 NOTE — PROGRESS NOTES
Cardiopulmonary (Metabolic) Stress Test    PATIENT: Jayde Daugherty  DATE: 3/25/2025  AGE/: 45 y.o./1980  REFERRING PHYSICIAN: David Rodriguez MD *    Cardiopulmonary Stress Test was completed today in Cardiopulmonary Stress Lab at [facility]. Correct procedure and correct patient verified verbally and with ID Band checked.    Vitals:    25 1500   BP: 104/64   Pulse: (!) 115   Resp: 20   SpO2: 98%     Vitals:    25 1500   Weight: 85.9 kg (189 lb 6 oz)     Body mass index is 32.53 kg/m².    Please see complete testing results for order in [facility] reporting with final physician interpretation.  Patient has met discharge criteria and has been discharged to home.    GERRY Dodson, CEP, CCT

## 2025-04-01 ENCOUNTER — HOSPITAL ENCOUNTER (OUTPATIENT)
Dept: CARDIOLOGY | Facility: HOSPITAL | Age: 45
Discharge: HOME | End: 2025-04-01
Payer: MEDICAID

## 2025-04-01 ENCOUNTER — HOSPITAL ENCOUNTER (OUTPATIENT)
Dept: RADIOLOGY | Facility: HOSPITAL | Age: 45
Discharge: HOME | End: 2025-04-01
Payer: MEDICAID

## 2025-04-01 VITALS — OXYGEN SATURATION: 95 % | SYSTOLIC BLOOD PRESSURE: 113 MMHG | DIASTOLIC BLOOD PRESSURE: 65 MMHG | HEART RATE: 113 BPM

## 2025-04-01 DIAGNOSIS — Z86.73 HISTORY OF TIA (TRANSIENT ISCHEMIC ATTACK): ICD-10-CM

## 2025-04-01 DIAGNOSIS — Z95.0 PACEMAKER: ICD-10-CM

## 2025-04-01 PROCEDURE — 93287 PERI-PX DEVICE EVAL & PRGR: CPT | Mod: 76

## 2025-04-01 PROCEDURE — 93287 PERI-PX DEVICE EVAL & PRGR: CPT | Performed by: INTERNAL MEDICINE

## 2025-04-01 PROCEDURE — 70551 MRI BRAIN STEM W/O DYE: CPT

## 2025-04-01 PROCEDURE — 93287 PERI-PX DEVICE EVAL & PRGR: CPT

## 2025-04-01 NOTE — NURSING NOTE
Patient is alert and able to make needs known; has a pacemaker/ICD and at approximately 10:55 the device clinical nurse arrived checked both pacemaker/ICD and turned both pacemaker/ICD pacing off, per device nurse. Patient stated that she's feeling fine and no signs of discomfort noted. JULI

## 2025-04-01 NOTE — NURSING NOTE
Patient completed MRI and the device clinical nurse checked patient's pacemaker/ICD and turned pacing back on, per device nurse. JULI

## 2025-04-05 ENCOUNTER — ANTICOAGULATION - WARFARIN VISIT (OUTPATIENT)
Dept: CARDIOLOGY | Facility: CLINIC | Age: 45
End: 2025-04-05
Payer: MEDICAID

## 2025-04-05 ENCOUNTER — APPOINTMENT (OUTPATIENT)
Dept: CARDIOLOGY | Facility: CLINIC | Age: 45
End: 2025-04-05
Payer: MEDICAID

## 2025-04-05 DIAGNOSIS — I48.0 PAROXYSMAL ATRIAL FIBRILLATION (MULTI): Primary | ICD-10-CM

## 2025-04-07 ENCOUNTER — TELEPHONE (OUTPATIENT)
Dept: CARDIOLOGY | Facility: CLINIC | Age: 45
End: 2025-04-07
Payer: MEDICAID

## 2025-04-08 ENCOUNTER — ANTICOAGULATION - WARFARIN VISIT (OUTPATIENT)
Dept: CARDIOLOGY | Facility: CLINIC | Age: 45
End: 2025-04-08
Payer: MEDICAID

## 2025-04-08 ENCOUNTER — APPOINTMENT (OUTPATIENT)
Dept: CARDIOLOGY | Facility: CLINIC | Age: 45
End: 2025-04-08
Payer: MEDICAID

## 2025-04-08 DIAGNOSIS — I48.0 PAROXYSMAL ATRIAL FIBRILLATION (MULTI): Primary | ICD-10-CM

## 2025-04-16 ENCOUNTER — ANTICOAGULATION - WARFARIN VISIT (OUTPATIENT)
Dept: CARDIOLOGY | Facility: CLINIC | Age: 45
End: 2025-04-16
Payer: MEDICAID

## 2025-04-16 DIAGNOSIS — I48.0 PAROXYSMAL ATRIAL FIBRILLATION (MULTI): Primary | ICD-10-CM

## 2025-04-16 LAB
POC INR: 2 (ref 0.9–1.1)
POC PROTHROMBIN TIME: ABNORMAL (ref 9.3–12.5)

## 2025-04-16 PROCEDURE — 85610 PROTHROMBIN TIME: CPT | Mod: QW

## 2025-04-16 PROCEDURE — 99211 OFF/OP EST MAY X REQ PHY/QHP: CPT

## 2025-04-16 NOTE — PROGRESS NOTES
Patient identification verified with 2 identifiers.    Location: [facility] at [facility] - [address] [address] [phone] option #1     Referring Physician: DR. BRIDGETTE MCDANIEL  Enrollment/ Re-enrollment date: 3/10/2026   INR Goal: 2.0-3.0  INR monitoring is per [facility] protocol.  Anticoagulation Medication: warfarin  Indication: Atrial Fibrillation/Atrial Flutter    Subjective   Bleeding signs/symptoms: No    Bruising: No   Major bleeding event: No  Thrombosis signs/symptoms: No  Thromboembolic event: No  Missed doses: No  Extra doses: No  Medication changes: Yes  TOPOMAX INCREASED 12 DAYS AGO  Dietary changes: Yes  PT EATING MORE VIT K  Change in health: No  Change in activity: No  Alcohol: No  Other concerns: No    Upcoming Procedures:  Does the Patient Have any upcoming procedures that require interruption in anticoagulation therapy? no  Does the patient require bridging? no      Anticoagulation Summary  As of 2025      INR goal:  2.0-3.0   TTR:  52.0% (1.5 y)   INR used for dosin.00 (2025)   Weekly warfarin total:  35 mg               Assessment/Plan   Therapeutic     1. New dose: no change    2. Next INR: 2 weeks      Education provided to patient during the visit:  Patient instructed to call in interim with questions, concerns and changes.   Patient educated on interactions between medications and warfarin.   Patient educated on compliance with dosing, follow up appointments, and prescribed plan of care.

## 2025-04-22 ENCOUNTER — HOSPITAL ENCOUNTER (OUTPATIENT)
Dept: CARDIOLOGY | Facility: HOSPITAL | Age: 45
Discharge: HOME | End: 2025-04-22
Payer: MEDICAID

## 2025-04-22 ENCOUNTER — OFFICE VISIT (OUTPATIENT)
Dept: CARDIOLOGY | Facility: HOSPITAL | Age: 45
End: 2025-04-22
Payer: MEDICAID

## 2025-04-22 VITALS
OXYGEN SATURATION: 98 % | HEIGHT: 64 IN | DIASTOLIC BLOOD PRESSURE: 91 MMHG | HEART RATE: 99 BPM | SYSTOLIC BLOOD PRESSURE: 126 MMHG | BODY MASS INDEX: 32.35 KG/M2 | WEIGHT: 189.5 LBS

## 2025-04-22 DIAGNOSIS — I42.9 CARDIOMYOPATHY, UNSPECIFIED TYPE (MULTI): ICD-10-CM

## 2025-04-22 DIAGNOSIS — I42.9 CARDIOMYOPATHY, UNSPECIFIED TYPE (MULTI): Primary | ICD-10-CM

## 2025-04-22 DIAGNOSIS — I50.22 CHRONIC SYSTOLIC CONGESTIVE HEART FAILURE: ICD-10-CM

## 2025-04-22 DIAGNOSIS — E78.00 PURE HYPERCHOLESTEROLEMIA: ICD-10-CM

## 2025-04-22 DIAGNOSIS — I24.0 CORONARY THROMBOSIS (MULTI): ICD-10-CM

## 2025-04-22 DIAGNOSIS — I48.0 PAROXYSMAL ATRIAL FIBRILLATION (MULTI): ICD-10-CM

## 2025-04-22 DIAGNOSIS — I50.20 HFREF (HEART FAILURE WITH REDUCED EJECTION FRACTION): ICD-10-CM

## 2025-04-22 DIAGNOSIS — I25.2 HISTORY OF ST ELEVATION MYOCARDIAL INFARCTION (STEMI): ICD-10-CM

## 2025-04-22 PROCEDURE — 3052F HG A1C>EQUAL 8.0%<EQUAL 9.0%: CPT | Performed by: INTERNAL MEDICINE

## 2025-04-22 PROCEDURE — 3050F LDL-C >= 130 MG/DL: CPT | Performed by: INTERNAL MEDICINE

## 2025-04-22 PROCEDURE — 3080F DIAST BP >= 90 MM HG: CPT | Performed by: INTERNAL MEDICINE

## 2025-04-22 PROCEDURE — 93005 ELECTROCARDIOGRAM TRACING: CPT

## 2025-04-22 PROCEDURE — 3008F BODY MASS INDEX DOCD: CPT | Performed by: INTERNAL MEDICINE

## 2025-04-22 PROCEDURE — 1036F TOBACCO NON-USER: CPT | Performed by: INTERNAL MEDICINE

## 2025-04-22 PROCEDURE — 3074F SYST BP LT 130 MM HG: CPT | Performed by: INTERNAL MEDICINE

## 2025-04-22 PROCEDURE — 99212 OFFICE O/P EST SF 10 MIN: CPT | Performed by: INTERNAL MEDICINE

## 2025-04-22 PROCEDURE — 99214 OFFICE O/P EST MOD 30 MIN: CPT | Performed by: INTERNAL MEDICINE

## 2025-04-22 RX ORDER — SACUBITRIL AND VALSARTAN 49; 51 MG/1; MG/1
1 TABLET, FILM COATED ORAL 2 TIMES DAILY
COMMUNITY

## 2025-04-22 RX ORDER — SPIRONOLACTONE 25 MG/1
12.5 TABLET ORAL DAILY
Qty: 45 TABLET | Refills: 3 | Status: SHIPPED | OUTPATIENT
Start: 2025-04-22 | End: 2026-04-22

## 2025-04-22 RX ORDER — METOPROLOL SUCCINATE 100 MG/1
1 TABLET, EXTENDED RELEASE ORAL
COMMUNITY
Start: 2025-02-23

## 2025-04-22 RX ORDER — TOPIRAMATE 25 MG/1
50 TABLET ORAL 2 TIMES DAILY
COMMUNITY
Start: 2025-04-02 | End: 2026-04-02

## 2025-04-22 NOTE — PATIENT INSTRUCTIONS
Start spironolactone 12.5 mg daily.  Check a BMP and fasting lipid profile in 1 week.  Check another BMP in 1 month.  Follow-up with Dr. Rodriguez.  Follow up in 3 months.

## 2025-04-22 NOTE — PROGRESS NOTES
Primary Care Physician: Arminda Thompson MD  Date of Visit: 2025  4:00 PM EDT  Location of visit: [facility]     Chief Complaint:   Chief Complaint   Patient presents with    Follow-up        HPI / Summary:   Jayde Daugherty is a 45 y.o. female who presents for followup.  She was admitted in February for strokelike symptoms and subsequently was diagnosed with migraine with aura.  She goes up and down stairs regularly at her house.  She has occasional dyspnea on exertion that improves when she takes furosemide.  She takes furosemide approximately once a week.  The patient denies chest pain,  palpitations, lightheadedness, syncope, orthopnea, paroxysmal nocturnal dyspnea, lower extremity edema, or bleeding problems.    When she was hospitalized her Entresto was decreased to 49/51 twice daily secondary to hypotension and lightheadedness.        Past Medical History:   Diagnosis Date    Myocardial infarction (Multi)     x 2 with significant apical involvement    Personal history of nicotine dependence 06/10/2019    Former cigarette smoker    Personal history of other diseases of the circulatory system 2014    History of atrial fibrillation    Personal history of other diseases of the respiratory system 2022    History of acute bronchitis    Systemic lupus erythematosus, unspecified 2013    Systemic lupus erythematosus        Past Surgical History:   Procedure Laterality Date    CERVICAL BIOPSY  W/ LOOP ELECTRODE EXCISION      Cervical Loop Electrosurgical Excision (LEEP)     SECTION, LOW TRANSVERSE      HYSTERECTOMY  2014    peripartum, supracervical, at [facility]    MR HEAD ANGIO WO IV CONTRAST  2013    MR HEAD ANGIO WO IV CONTRAST 2013 [facility]    MR NECK ANGIO WO IV CONTRAST  2013    MR NECK ANGIO WO IV CONTRAST 2013 [facility] [place] [facility]    OTHER SURGICAL HISTORY  2019    Cardioverter defibrillator insertion    TOTAL HIP  "ARTHROPLASTY Bilateral 03/11/2014    Hip Replacement          Social History:   reports that she quit smoking about 9 years ago. Her smoking use included cigarettes. She started smoking about 34 years ago. She has a 25 pack-year smoking history. She has never used smokeless tobacco. She reports current alcohol use of about 3.0 standard drinks of alcohol per week. She reports that she does not currently use drugs.     Family History:  family history is not on file.      Allergies:  Allergies   Allergen Reactions    Hay Fever And Allergy Relief Runny nose       Outpatient Medications:  Current Outpatient Medications   Medication Instructions    albuterol 90 mcg/actuation inhaler 2 puffs, Every 6 hours PRN    clopidogrel (PLAVIX) 75 mg, Daily    dapagliflozin propanediol (FARXIGA) 10 mg, oral, Daily    desipramine (NORPRAMIN) 10 mg, Daily    DULoxetine (CYMBALTA) 30 mg, 2 times daily    enoxaparin (Lovenox) 40 mg/0.4 mL syringe One injection twice daily every 12 hours Continue until directed by coumadin clinic with therapeutic INR.    furosemide (LASIX) 40 mg, Daily PRN    gabapentin (NEURONTIN) 200 mg, 2 times daily    metoprolol succinate XL (Toprol-XL) 100 mg 24 hr tablet 1 tablet, Every 12 hours scheduled (0630,1830)    montelukast (SINGULAIR) 10 mg, Daily    rosuvastatin (CRESTOR) 20 mg, oral, Daily    sacubitriL-valsartan (Entresto) 49-51 mg tablet 1 tablet, 2 times daily    sertraline (ZOLOFT) 100 mg, Daily    topiramate (TOPAMAX) 50 mg, 2 times daily    Trulicity 1.5 mg, Weekly    warfarin (Coumadin) 5 mg tablet Take 1 tablet (5 mg) by mouth once daily.       Physical Exam:  Vitals:    04/22/25 1632   BP: (!) 126/91   BP Location: Left arm   Patient Position: Sitting   Pulse: 99   SpO2: 98%   Weight: 86 kg (189 lb 8 oz)   Height: 1.626 m (5' 4\")     Wt Readings from Last 5 Encounters:   04/22/25 86 kg (189 lb 8 oz)   03/25/25 85.9 kg (189 lb 6 oz)   04/01/25 83.9 kg (185 lb)   02/05/25 84.8 kg (187 lb) "   02/05/25 84.8 kg (187 lb)     Body mass index is 32.53 kg/m².   General: Well-developed well-nourished in no acute distress  HEENT: Normocephalic atraumatic  Neck: Supple, JVP is normal negative hepatojugular reflux 2+ carotid pulses without bruit  Pulmonary: Normal respiratory effort, clear to auscultation  Cardiovascular: No right ventricular heave, normal S1 and S2, no murmurs rubs or gallops  Abdomen: Soft nontender nondistended  Extremities: Warm without edema 2+ radial pulses bilaterally   Neurologic: Alert and oriented x3  Psychiatric: Normal mood and affect     Last Labs:  CMP:  Recent Labs     02/01/25  0441 01/31/25  1207 01/13/25  1026    132* 137   K 3.8 4.3 3.9    102 99   CO2 28 22 30   ANIONGAP 11 12 12   BUN 10 8 10   CREATININE 0.77 0.59 0.76   EGFR >90 >90 >90   GLUCOSE 130* 125* 219*     Recent Labs     02/01/25  0441 01/31/25  1207 01/13/25  1026   ALBUMIN 3.6 3.9 4.2   ALKPHOS 39 44 55   ALT 12 11 14   AST 14 19 14   BILITOT 0.4 0.4 0.6     CBC:  Recent Labs     02/01/25  0441 01/31/25  2258 01/31/25  1207   WBC 2.5* 2.7* 2.4*   HGB 13.3 13.6 14.2   HCT 41.2 41.2 41.6   * 148* 163   MCV 93 91 90     COAG:   Recent Labs     04/16/25  1516 03/24/25  0000 09/23/23  0000 02/15/23  1057 01/05/23  0603 01/04/23  1907   INR 2.00* 4.00   < >  --    < > 2.4*   DDIMERVTE  --   --   --  459  --  484    < > = values in this interval not displayed.     HEME/ENDO:  Recent Labs     02/18/25  0337 01/31/25  2258 04/02/24  1325 02/15/23  0506 09/29/22  1628 12/21/21  1137 12/17/21  1539   FERRITIN  --   --   --   --   --   --  321*   TSH  --   --  1.77  --  0.82 1.55  --    HGBA1C 8.3* 8.9* 8.3*   < > 6.2* 9.0*  --     < > = values in this interval not displayed.      CARDIAC:   Recent Labs     01/31/25  2204 01/31/25  1207 01/13/25  1128 01/13/25  1026 02/14/23  1254 02/14/23  1113   TROPHS  --  7 12 17*   < > 8   *  --   --  314*  --  170*    < > = values in this interval not  displayed.     Recent Labs     01/31/25  2206 04/02/24  1325 02/15/23  0506 09/29/22  1628 09/19/22  1013   CHOL 231* 259* 170 276* 204*   LDLF  --   --  97 174* 128*   LDLCALC 148* 161*  --   --   --    HDL 45.5 56.9 47.6 68.3 58.8   TRIG 190* 204* 129 170* 88     I reviewed laboratory data from February 1, 2025.  LP(a) elevated at 116    Last Cardiology Tests:  ECG:  An electrocardiogram performed today that I reviewed shows normal sinus rhythm with PVCs prior anterior infarct left axis deviation.    Echo:  Echocardiogram February 1, 2025  CONCLUSIONS:   1. Left ventricular ejection fraction is severely decreased, calculated by Lopez's biplane at 23%.   2. There is global hypokinesis of the left ventricle with minor regional variations.   3. Spectral Doppler shows a Grade I (impaired relaxation pattern) of left ventricular diastolic filling with normal left atrial filling pressure.   4. Left ventricular cavity size is mildly dilated.   5. There is normal right ventricular global systolic function.   6. Mild to moderate mitral valve regurgitation.   7. Right ventricular systolic pressure is within normal limits.     Echocardiogram April 17, 2024   CONCLUSIONS:   1. Left ventricular systolic function is severely decreased with a 25-30% estimated ejection fraction.   2. Apical septal segment, apical inferior segment, and apex are abnormal.   3. Spectral Doppler shows an impaired relaxation pattern of left ventricular diastolic filling.   4. There is mildly reduced right ventricular systolic function.   5. There is global hypokinesis of the left ventricle with minor regional variations.      Echocardiogram January 4, 2023  CONCLUSIONS:   1. Left ventricular systolic function is severely decreased with a 20% estimated ejection fraction.   2. Spectral Doppler shows an abnormal pattern of left ventricular diastolic filling.   3. The left atrium is severely dilated.   4. Mild to moderate mitral valve regurgitation.   5.  Mildly elevated RVSP.   6. Left ventricular cavity size is moderately dilated.   7. There is global hypokinesis of the left ventricle with minor regional variations.       Cath:  Right heart catheterization January 6, 2023  CONCLUSIONS:   1. Normal left and right heart filling pressures.   2. No evidence of pulmonary hypertension.   3. Low normal cardiac index with elevated SVR (1498).   4. No evidence of intracardiac shunt.    Cardiac catheterization September 19, 2022  CONCLUSIONS:   1. No significant CAD in a codominant system.    Cardiac catheterization June 2, 2019  Coronary Lesion Summary:  Vessel   Stenosis    Vessel Segment  LAD    100% stenosis proximal to mid  CONCLUSIONS:   1. Successful OCT guided PCI of the distal left main and proximal LAD with mechanical thrombectomy.   2. 100% thormbotic occlusion of proximal-mid LAD with large thrombus burden in distal LM and proximal LAD in a codominant system.   3. Likely embolic vs. insitu thrombosis with otherwise angiographically normal coronary arteries.   4. Elevated LVEDP.   5. No aortic stenosis.   6. Left Ventricular end-diastolic pressure = 35.    Stress Test:  Cardiopulmonary exercise test March 25, 2025   Impression:    1. Peak oxygen consumption 14.2 ml/kg/min (55% predicted) or 24.3 corrected for lean body mass.   2. Normal BP response, normal VO2/work rate (10) mildly elevated VE/VCo2 slope (39), EOV was noted.   3. EKG showed sinus rhythm with rare PVCs, non-diagnostic for ischemia due to failure to reach target heart rate.   4. Test was submaximal based on v slope and RER <1.   5. Moderate decrease in exercise capacity but primary limitation does not appear to be cardiac (unable to assess ventilatory limitation given lack of spirometry data).   6. Elevated Ve/vco2 and EOV does suggest pulmonary congestion but no evidence of a severe cardiac limitation based on achieved exercise level.         Cardiac Imaging:  CT angio of the chest January 4,  2023  FINDINGS:     Pulmonary arteries are adequately opacified without acute or chronic  filling defects.       The heart is normal in size without pericardial effusion.       Thoracic lymph nodes are not enlarged.     There is no pleural effusion, pleural thickening, or pneumothorax.           Assessment/Plan   Diagnoses and all orders for this visit:  Cardiomyopathy, unspecified type (Multi)  -     ECG 12 lead; Future  -     spironolactone (Aldactone) 25 mg tablet; Take 0.5 tablets (12.5 mg) by mouth once daily.  -     Basic metabolic panel; Future  -     Basic metabolic panel; Future  Paroxysmal atrial fibrillation (Multi)  HFrEF (heart failure with reduced ejection fraction)  Chronic systolic congestive heart failure  Coronary thrombosis (Multi)  Pure hypercholesterolemia  -     Lipid panel; Future  History of ST elevation myocardial infarction (STEMI)    In summary Ms. Gm Martinez is a pleasant 45 year-old -American female with a past medical history significant for anterior ST elevation myocardial infarction in the setting of massive thrombus (embolic versus in situ thrombosis) involving the distal left main and LAD with residual cardiomyopathy ejection fraction 25% status post ICD, paroxysmal atrial fibrillation, hypertension, hyperlipidemia, type II insulin-requiring diabetes, lupus, LV thrombus on Warfarin, and prior history of recurrent peripartum cardiomyopathy.  She does note intermittent dyspnea on exertion that waxes and wanes depending on her volume status.  Her volume status today is acceptable.  I did restart spironolactone at 12.5 mg daily.  She will have a repeat BMP and lipid profile in 1 week.  Will recheck another BMP in 1 month.  I encouraged her to increase her activity.  She will follow-up with advanced heart failure.  We will see her back in follow-up in 3 months.      Orders:  Orders Placed This Encounter   Procedures    Basic metabolic panel    Lipid panel    Basic metabolic  panel    ECG 12 lead      Followup Appts:  Future Appointments   Date Time Provider Department Center   4/30/2025 11:45 AM ANTICOAG [facility] [id number] COAG CLINIC [id number] [facility]   9/30/2025  3:30 PM Jayde Juárez MD [id number] [facility]           ____________________________________________________________  Migue Bonds MD  [facility] [place]  [facility]

## 2025-04-23 LAB
ATRIAL RATE: 99 BPM
P AXIS: 36 DEGREES
P OFFSET: 194 MS
P ONSET: 139 MS
PR INTERVAL: 154 MS
Q ONSET: 216 MS
QRS COUNT: 16 BEATS
QRS DURATION: 104 MS
QT INTERVAL: 384 MS
QTC CALCULATION(BAZETT): 492 MS
QTC FREDERICIA: 453 MS
R AXIS: -47 DEGREES
T AXIS: 67 DEGREES
T OFFSET: 408 MS
VENTRICULAR RATE: 99 BPM

## 2025-04-23 PROCEDURE — 93005 ELECTROCARDIOGRAM TRACING: CPT

## 2025-04-29 DIAGNOSIS — E78.00 PURE HYPERCHOLESTEROLEMIA: ICD-10-CM

## 2025-04-29 DIAGNOSIS — I42.9 CARDIOMYOPATHY, UNSPECIFIED TYPE (MULTI): ICD-10-CM

## 2025-04-30 ENCOUNTER — ANTICOAGULATION - WARFARIN VISIT (OUTPATIENT)
Dept: CARDIOLOGY | Facility: CLINIC | Age: 45
End: 2025-04-30
Payer: MEDICAID

## 2025-04-30 ENCOUNTER — APPOINTMENT (OUTPATIENT)
Dept: CARDIOLOGY | Facility: CLINIC | Age: 45
End: 2025-04-30
Payer: MEDICAID

## 2025-04-30 DIAGNOSIS — I48.0 PAROXYSMAL ATRIAL FIBRILLATION (MULTI): Primary | ICD-10-CM

## 2025-05-01 ENCOUNTER — ANTICOAGULATION - WARFARIN VISIT (OUTPATIENT)
Dept: CARDIOLOGY | Facility: CLINIC | Age: 45
End: 2025-05-01
Payer: MEDICAID

## 2025-05-01 DIAGNOSIS — I48.0 PAROXYSMAL ATRIAL FIBRILLATION (MULTI): Primary | ICD-10-CM

## 2025-05-01 LAB
POC INR: 2.7 (ref 0.9–1.1)
POC PROTHROMBIN TIME: ABNORMAL (ref 9.3–12.5)

## 2025-05-01 PROCEDURE — 99211 OFF/OP EST MAY X REQ PHY/QHP: CPT

## 2025-05-01 PROCEDURE — 85610 PROTHROMBIN TIME: CPT | Mod: QW

## 2025-05-01 NOTE — PROGRESS NOTES
Patient identification verified with 2 identifiers.    Location: [facility] - [address] [address] [phone] option #1     Referring Physician: DR. BRIDGETTE MCDANIEL  Enrollment/ Re-enrollment date: 3/10/2026   INR Goal: 2.0-3.0  INR monitoring is per [facility] protocol.  Anticoagulation Medication: warfarin  Indication: Atrial Fibrillation/Atrial Flutter    Subjective   Bleeding signs/symptoms: No  Bruising: No   Major bleeding event: No  Thrombosis signs/symptoms: No  Thromboembolic event: No  Missed doses: No  Extra doses: No  Medication changes: No  Dietary changes: No  Change in health: No  Change in activity: No  Alcohol: No  Other concerns: No    Upcoming Procedures:  Does the Patient Have any upcoming procedures that require interruption in anticoagulation therapy? no  Does the patient require bridging? no      Anticoagulation Summary  As of 2025      INR goal:  2.0-3.0   TTR:  53.3% (1.6 y)   INR used for dosin.70 (2025)   Weekly warfarin total:  35 mg               Assessment/Plan   Therapeutic     1. New dose: no change    2. Next INR: 2 weeks      Education provided to patient during the visit:  Patient instructed to call in interim with questions, concerns and changes.   Patient educated on compliance with dosing, follow up appointments, and prescribed plan of care.

## 2025-05-05 DIAGNOSIS — I25.5 ISCHEMIC CARDIOMYOPATHY: ICD-10-CM

## 2025-05-05 DIAGNOSIS — E78.00 PURE HYPERCHOLESTEROLEMIA: ICD-10-CM

## 2025-05-07 ENCOUNTER — APPOINTMENT (OUTPATIENT)
Dept: SLEEP MEDICINE | Facility: CLINIC | Age: 45
End: 2025-05-07
Payer: MEDICAID

## 2025-05-07 VITALS
DIASTOLIC BLOOD PRESSURE: 62 MMHG | HEART RATE: 84 BPM | HEIGHT: 64 IN | OXYGEN SATURATION: 98 % | SYSTOLIC BLOOD PRESSURE: 120 MMHG | WEIGHT: 185 LBS | BODY MASS INDEX: 31.58 KG/M2

## 2025-05-07 DIAGNOSIS — I42.9 CARDIOMYOPATHY, UNSPECIFIED TYPE (MULTI): Chronic | ICD-10-CM

## 2025-05-07 DIAGNOSIS — I48.0 PAROXYSMAL ATRIAL FIBRILLATION (MULTI): ICD-10-CM

## 2025-05-07 DIAGNOSIS — I10 HYPERTENSION, UNSPECIFIED TYPE: ICD-10-CM

## 2025-05-07 DIAGNOSIS — G47.19 EXCESSIVE DAYTIME SLEEPINESS: ICD-10-CM

## 2025-05-07 DIAGNOSIS — G47.30 SLEEP-RELATED BREATHING DISORDER: Primary | ICD-10-CM

## 2025-05-07 PROCEDURE — 3052F HG A1C>EQUAL 8.0%<EQUAL 9.0%: CPT | Performed by: PHYSICIAN ASSISTANT

## 2025-05-07 PROCEDURE — 3074F SYST BP LT 130 MM HG: CPT | Performed by: PHYSICIAN ASSISTANT

## 2025-05-07 PROCEDURE — 99203 OFFICE O/P NEW LOW 30 MIN: CPT | Performed by: PHYSICIAN ASSISTANT

## 2025-05-07 PROCEDURE — 3050F LDL-C >= 130 MG/DL: CPT | Performed by: PHYSICIAN ASSISTANT

## 2025-05-07 PROCEDURE — 3078F DIAST BP <80 MM HG: CPT | Performed by: PHYSICIAN ASSISTANT

## 2025-05-07 PROCEDURE — 1036F TOBACCO NON-USER: CPT | Performed by: PHYSICIAN ASSISTANT

## 2025-05-07 PROCEDURE — 3008F BODY MASS INDEX DOCD: CPT | Performed by: PHYSICIAN ASSISTANT

## 2025-05-07 ASSESSMENT — SLEEP AND FATIGUE QUESTIONNAIRES
HOW LIKELY ARE YOU TO NOD OFF OR FALL ASLEEP WHILE WATCHING TV: WOULD NEVER DOZE
HOW LIKELY ARE YOU TO NOD OFF OR FALL ASLEEP WHILE SITTING AND READING: SLIGHT CHANCE OF DOZING
SITING INACTIVE IN A PUBLIC PLACE LIKE A CLASS ROOM OR A MOVIE THEATER: WOULD NEVER DOZE
HOW LIKELY ARE YOU TO NOD OFF OR FALL ASLEEP WHILE LYING DOWN TO REST IN THE AFTERNOON WHEN CIRCUMSTANCES PERMIT: HIGH CHANCE OF DOZING
ESS-CHAD TOTAL SCORE: 6
HOW LIKELY ARE YOU TO NOD OFF OR FALL ASLEEP WHILE SITTING QUIETLY AFTER LUNCH WITHOUT ALCOHOL: SLIGHT CHANCE OF DOZING
HOW LIKELY ARE YOU TO NOD OFF OR FALL ASLEEP IN A CAR, WHILE STOPPED FOR A FEW MINUTES IN TRAFFIC: SLIGHT CHANCE OF DOZING
HOW LIKELY ARE YOU TO NOD OFF OR FALL ASLEEP WHILE SITTING AND TALKING TO SOMEONE: WOULD NEVER DOZE
HOW LIKELY ARE YOU TO NOD OFF OR FALL ASLEEP WHEN YOU ARE A PASSENGER IN A CAR FOR AN HOUR WITHOUT A BREAK: WOULD NEVER DOZE

## 2025-05-07 ASSESSMENT — PAIN SCALES - GENERAL: PAINLEVEL_OUTOF10: 0-NO PAIN

## 2025-05-07 NOTE — PROGRESS NOTES
"[facility]  New Visit Note        HISTORY OF PRESENT ILLNESS     The patient's referring provider is: Dr. Torres    HISTORY OF PRESENT ILLNESS   Jayde Daugherty is a 45 y.o. female who presents to a [facility] for a sleep medicine evaluation with concerns of Snoring and Unrefreshing Sleep.     PAST SLEEP HISTORY    Patient has the following sleep study results: in-lab sleep study in 2019 was negative for DRAGAN    CURRENT HISTORY    The patient reports symptoms of snoring, witness apneas, unrefreshing sleep, and excessive daytime sleepiness. She also reports waking up with headaches.    She has a history of atrial fibrillation and a heart attack two years ago; she is followed by cardiology.    She experiences a cough at night, which she attributes to a tickle in her windpipe. She sometimes uses beer to help her fall asleep. She has lupus and fibromyalgia diagnoses.    STOP BANG    STOP: ST  BANG: A    SLEEP RELATED-ROS:  SLEEP SCHEDULE WEEKDAYS/WORKDAYS  Usual Bedtime 11:00 PM  Falls asleep around 11:05 PM  Wake time 7:50 AM    SLEEP SCHEDULE WEEKENDS/NON-WORKDAYS:  Same    NAPS: Around 1:00 PM for two to four hours.    AVERAGE SLEEP DURATION: 6-8 hours/day    PREFERRED SLEEP POSITION: Side    SLEEP INITIATION: No reported issues falling asleep.    SLEEP MAINTENANCE: Not mentioned.    RECREATIONAL DRUG USE  SMOKING: Quit smoking in 2016.  ALCOHOL: Drinks alcohol about four nights per week.  CAFFEINE: Drinks caffeine occasionally.  MARIJUANA: Never uses marijuana.    ESS: 6      PHYSICAL EXAM     VITAL SIGNS: /62   Pulse 84   Ht 1.626 m (5' 4\")   Wt 83.9 kg (185 lb)   SpO2 98%   BMI 31.76 kg/m²      PREVIOUS WEIGHTS:  Wt Readings from Last 3 Encounters:   05/07/25 83.9 kg (185 lb)   04/22/25 86 kg (189 lb 8 oz)   03/25/25 85.9 kg (189 lb 6 oz)       PHYSICAL EXAM: GENERAL: alert oriented x 3 pleasant and cooperative no acute " distress  MODIFIED MALLAMPATI SCORE: 2+  LATERAL PHARYNGEAL WALL: 2+  NECK EXAM: normal supple no adenopathy    RESULTS/DATA     Ferritin (ug/L)   Date Value   12/17/2021 321 (H)       ASSESSMENT/PLAN     Ms. Daugherty is a 45 y.o. female and was referred to the [facility] for the following issues:    OBSTRUCTIVE SLEEP APNEA / SLEEPINESS/ FREQUENT WAKING  -Ordering sleep study to evaluate    BMI>30  -Body mass index is 31.76 kg/m².  today  -With sufficient weight loss may no longer require treatment for DRAGAN    AFIB/ HYPERTENSION  BP Readings from Last 3 Encounters:   05/07/25 120/62   04/22/25 (!) 126/91   04/01/25 113/65      -Well controlled with current treatments    Followup 3 weeks after sleep study to review results

## 2025-05-07 NOTE — PATIENT INSTRUCTIONS
Thank you for coming to the Sleep Medicine Clinic today! Your sleep medicine provider today was: Curtis Cohen PA-C Below is a summary of your treatment plan, other important information, and our contact numbers:      TREATMENT PLAN     Call [phone]), option 3 to schedule your sleep study. When you have an appointment please call us back at [phone] to schedule a followup appointment 3-4 weeks after to review results.    Obstructive Sleep Apnea (DRAGAN) is a sleep disorder where your upper airway muscles relax during sleep and the airway intermittently and repetitively narrows and collapses leading to partially blocked airway (hypopnea) or completely blocked airway (apnea) which, in turn, can disrupt breathing in sleep, lower oxygen levels while you sleep and cause night time wakings. Because both apnea and hypopnea may cause higher carbon dioxide or low oxygen levels, untreated DRAGAN can lead to heart arrhythmia, elevation of blood pressure, and make it harder for the body to consolidate memory and facilitate metabolism (leading to higher blood sugars at night). Frequent partial arousals occur during sleep resulting in sleep deprivation and daytime sleepiness. DRAGAN is associated with an increased risk of cardiovascular disease, stroke, hypertension, and insulin resistance. Moreover, untreated DRAGAN with excessive daytime sleepiness can increase the risk of motor vehicular accidents.    Some conservative strategies for DRAGAN regardless of DRAGAN severity are:   Positional therapy - Avoid sleeping on your back.   Healthy diet and regular exercise to optimize weight is highly encouraged.   Avoid alcohol late in the evening and sedative-hypnotics as these substances can make sleep apnea worse.   Improve breathing through the nose with intranasal steroid spray, saline rinse, or antihistamines    Safety: Avoid driving vehicle and operating heavy equipment while sleepy. Drowsy driving may lead to life-threatening  motor vehicle accidents. A person driving while sleepy is 5 times more likely to have an accident. If you feel sleepy, pull over and take a short power nap (sleep for less than 30 minutes). Otherwise, ask somebody to drive you.    Treatment options for sleep apnea include weight management, positional therapy, Positive Airway Therapy (PAP) therapy, oral appliance therapy, hypoglossal nerve stimulator (Inspire) and select airway surgeries.      OUR SLEEP TESTING LOCATIONS     Our team will contact you to schedule your sleep study, however, you can contact us as follow:  Main Phone Line (scheduling only): [phone]), option 3  Adult and Pediatric Locations  [facility] (6 years and older): [facility] - 4th floor ([address]) After hours line: [phone]  [facility] (Main campus: All ages): [facility], 6th floor. After hours line: [phone]   [place] (18 years and older): [address] [facility] [facility], 2nd floor   [place] (18 years and older): [address] [facility]; 4th floor  After hours line: [phone]  [facility] (18 years and older) at [place]: [address] [facility]  After hours line: [phone]    [place] (5 years and older; younger considered on case-by-case basis): [address] [facility]; [address], [address]. Scheduling  After hours line: [phone]   [place] (6 years and older): [address]   [place] (6 years and older): [address] [facility], [address]  After hours line: [phone]   [place] (13 years and older) in [place]: [address], 2nd floor  After hours line: [phone]   [place] (13 year and older): [address]  After hours line: [phone]      IMPORTANT PHONE NUMBERS     Sleep Medicine Clinic Fax: [phone]  Appointments (for Adult Sleep Clinic): [phone] - option 2  Appointments (For Sleep Studies):  "[phone] - option 3  Behavioral Sleep Medicine: [phone]    [facility] ([place]): [phone]  For clinical questions and refilling prescriptions: [phone]  Leida Ye (For [place]át[place]): P: [phone]  F: [phone]       CONTACTING YOUR SLEEP MEDICINE PROVIDER     Send a message directly to your provider through \"My Chart\", which is the email service through your  Records Account: [email or web address]   Call [phone] and leave a message. One of the administrative assistants will forward the message to your sleep medicine provider through \"My Chart\" and/or email.     Your sleep medicine provider for this visit was: Curtis Cohen PA-C  "

## 2025-05-08 ENCOUNTER — HOSPITAL ENCOUNTER (OUTPATIENT)
Dept: RADIOLOGY | Facility: HOSPITAL | Age: 45
Discharge: HOME | End: 2025-05-08
Payer: MEDICAID

## 2025-05-08 ENCOUNTER — OFFICE VISIT (OUTPATIENT)
Dept: PAIN MEDICINE | Facility: HOSPITAL | Age: 45
End: 2025-05-08
Payer: MEDICAID

## 2025-05-08 DIAGNOSIS — M54.16 LUMBAR RADICULITIS: ICD-10-CM

## 2025-05-08 PROCEDURE — 99204 OFFICE O/P NEW MOD 45 MIN: CPT | Performed by: ANESTHESIOLOGY

## 2025-05-08 PROCEDURE — 72120 X-RAY BEND ONLY L-S SPINE: CPT

## 2025-05-08 PROCEDURE — 3050F LDL-C >= 130 MG/DL: CPT | Performed by: ANESTHESIOLOGY

## 2025-05-08 PROCEDURE — 99214 OFFICE O/P EST MOD 30 MIN: CPT | Performed by: ANESTHESIOLOGY

## 2025-05-08 PROCEDURE — 3052F HG A1C>EQUAL 8.0%<EQUAL 9.0%: CPT | Performed by: ANESTHESIOLOGY

## 2025-05-08 ASSESSMENT — PAIN SCALES - GENERAL: PAINLEVEL_OUTOF10: 7

## 2025-05-08 NOTE — PROGRESS NOTES
Chief Complaint   Patient presents with    Back Pain    Extremity Pain     Subjective   Patient ID: Jayde Daugherty is a 45 y.o. female with a past medical history of systemic lupus erythematosus, STEMI s/p PCI, atrial fibrillation, anticoagulation on warfarin and Plavix, HFrEF (EF of 23%) ventricular tachycardia s/p Medtronic ICD, CVA (had strokelike symptoms, and was worked up for R MCA stroke that was found on CT angiogram, but not found on MRI), fibromyalgia, chronic low back pain with radicular symptoms, who presents to us for evaluation of her low back pain. Patient states that she follows with a rheumatologist for her lupus and fibromyalgia, and does low-impact physical therapy exercises for her fibromyalgia. She is also taking gabapentin 100 mg twice daily for fibromyalgia.     Regarding her low back pain, she states that she has been dealing with it for several years, and typically radiates into the buttocks posteriorly bilaterally. It is worse on the right. Pain is typically aggravated by prolonged standing, prolonged walking, and occasionally prolonged sitting. Nothing seems to make the pain better. She has attempted physical therapy and other medications without significant relief.  She does have limited options for medication therapy, due to her medical history requiring anticoagulation therapy as well as increased daytime sleepiness. She is getting worked up for increased daytime sleepiness with a sleep study.    The pain causes significant stress in the patient's life, interfering with general activity, mood, ability to walk distances, ability to perform tasks at home and/or work. Patient participates in physical therapy, and continues to perform physician directed exercises at home. Patient denies any bowel or bladder incontinence, saddle anesthesia, weaknesses, or falls.      Review of Systems   13-point ROS done and negative except for HPI.     Current Outpatient Medications   Medication  Instructions    albuterol 90 mcg/actuation inhaler 2 puffs, Every 6 hours PRN    clopidogrel (PLAVIX) 75 mg, Daily    dapagliflozin propanediol (FARXIGA) 10 mg, oral, Daily    desipramine (NORPRAMIN) 10 mg, Daily    DULoxetine (CYMBALTA) 30 mg, 2 times daily    enoxaparin (Lovenox) 40 mg/0.4 mL syringe One injection twice daily every 12 hours Continue until directed by coumadin clinic with therapeutic INR.    furosemide (LASIX) 40 mg, Daily PRN    gabapentin (NEURONTIN) 200 mg, 2 times daily    metoprolol succinate XL (Toprol-XL) 100 mg 24 hr tablet 1 tablet, Every 12 hours scheduled (0630,1830)    montelukast (SINGULAIR) 10 mg, Daily    rosuvastatin (CRESTOR) 20 mg, oral, Daily    sacubitriL-valsartan (Entresto) 49-51 mg tablet 1 tablet, 2 times daily    sertraline (ZOLOFT) 100 mg, Daily    spironolactone (ALDACTONE) 12.5 mg, oral, Daily    topiramate (TOPAMAX) 50 mg, 2 times daily    Trulicity 1.5 mg, Weekly    warfarin (Coumadin) 5 mg tablet Take 1 tablet (5 mg) by mouth once daily.       Medical History[1]     Surgical History[2]     Family History[3]     RX Allergies[4]     Objective     There were no vitals filed for this visit.     Physical Exam  General: NAD, well groomed, well nourished  Eyes: Non-icteric sclera, EOMI  Ears, Nose, Mouth, and Throat: External ears and nose appear to be without deformity or rash. No lesions or masses noted. Hearing is grossly intact.   Neck: Supple, trachea midline, no appreciable lumps or lymph nodes  Respiratory: Nonlabored breathing   Cardiovascular: No peripheral edema observed  Skin: No rashes or open lesions/ulcers identified on skin.  Psychiatric: Alert, orientation to person, place, and time. Cooperative.    Neurologic:   Cranial nerves grossly intact.   Strength: 5/5 and symmetric plantar/dorsiflexion   Sensation: Normal to light touch throughout; pinprick intact throughout.   DTRs:normal and symmetric throughout  Mijares: absent  Clonus: absent    Back:   Palpation:  Tenderness to palpation over lumbar paraspinous muscles.   Straight leg raise: does not reproduce their pain, bilaterally   WHITNEY Maneuver does not reproduce pain bilaterally  Facet Loading test: absent on lumbar spine    Hip: No pain over greater trochanters. and Pain not reproduced with hip internal/external rotation.     Imaging personally reviewed and independently interpreted:   No results found for this or any previous visit from the past 1000 days.     No image results found.     1. Lumbar radiculitis  XR lumbar spine 4+ views w flexion extension    Referral to Physical Therapy           Assessment/Plan   Jayde Daugherty is a 45 y.o. female with a past medical history of systemic lupus erythematosus, STEMI s/p PCI, atrial fibrillation, anticoagulation on warfarin and Plavix, HFrEF (EF of 23%) ventricular tachycardia s/p Medtronic ICD, CVA (had strokelike symptoms, and was worked up for R MCA stroke that was found on CT angiogram, but not found on MRI), fibromyalgia, chronic low back pain with radicular symptoms, who presents to us for evaluation of her low back pain. She follows with a rheumatologist for her lupus and fibromyalgia.  Her low back pain radiates into the buttocks posteriorly bilaterally, but worse on the right.  It is aggravated by prolonged sitting, standing, walking, and is without any particular alleviating factors. Based on history, the pain appears to be discogenic in nature.  We will evaluate with MRI lumbar spine, and tailor therapy based on findings.    Plan:  - We will provide referral for physical therapy for low back discogenic pain with radicular symptoms.  - Will order x-ray lumbar spine flexion/extension.  - Will consider MRI lumbar spine if patient's pain persist despite physical therapy.  Pending results of MRI lumbar spine, may consider epidural steroid injection.    Patient may need lifelong anticoagulation as she had cardiac event being off of blood thinner in the  past.  Would have to discuss with her treatment team if it is possible to hold her blood thinner at some point.  She did previously see my colleague Dr. Rosenthal but that was closer to initiation of her blood thinners and at that time she could not hold them.  It is unclear as to whether or not she could be bridged to pursue a procedure.    Follow up: After X-rays    The patient was invited to contact us back anytime with any questions or concerns and follow-up with us in the office as needed.     Diagnoses and all orders for this visit:  Lumbar radiculitis  -     XR lumbar spine 4+ views w flexion extension; Future  -     Referral to Physical Therapy; Future      This note was generated with the aid of dictation software, there may be typos despite my attempts at proofreading.     Houston Menard MD  Interventional Pain Medicine Fellow  [facility]          [1]   Past Medical History:  Diagnosis Date    Myocardial infarction (Multi)     x 2 with significant apical involvement    Personal history of nicotine dependence 06/10/2019    Former cigarette smoker    Personal history of other diseases of the circulatory system 2014    History of atrial fibrillation    Personal history of other diseases of the respiratory system 2022    History of acute bronchitis    Systemic lupus erythematosus, unspecified 2013    Systemic lupus erythematosus   [2]   Past Surgical History:  Procedure Laterality Date    CERVICAL BIOPSY  W/ LOOP ELECTRODE EXCISION      Cervical Loop Electrosurgical Excision (LEEP)     SECTION, LOW TRANSVERSE      HYSTERECTOMY  2014    peripartum, supracervical, at [facility]    MR HEAD ANGIO WO IV CONTRAST  2013    MR HEAD ANGIO WO IV CONTRAST 2013 [facility]    MR NECK ANGIO WO IV CONTRAST  2013    MR NECK ANGIO WO IV CONTRAST 2013 [facility]    OTHER SURGICAL HISTORY  2019    Cardioverter  defibrillator insertion    TOTAL HIP ARTHROPLASTY Bilateral 03/11/2014    Hip Replacement   [3] No family history on file.  [4]   Allergies  Allergen Reactions    Hay Fever And Allergy Relief Runny nose

## 2025-05-15 ENCOUNTER — ANTICOAGULATION - WARFARIN VISIT (OUTPATIENT)
Dept: CARDIOLOGY | Facility: CLINIC | Age: 45
End: 2025-05-15
Payer: MEDICAID

## 2025-05-15 DIAGNOSIS — I48.0 PAROXYSMAL ATRIAL FIBRILLATION (MULTI): Primary | ICD-10-CM

## 2025-05-15 LAB
POC INR: 2 (ref 0.9–1.1)
POC PROTHROMBIN TIME: NORMAL (ref 9.3–12.5)

## 2025-05-15 PROCEDURE — 99211 OFF/OP EST MAY X REQ PHY/QHP: CPT

## 2025-05-15 PROCEDURE — 85610 PROTHROMBIN TIME: CPT | Mod: QW

## 2025-05-15 NOTE — PROGRESS NOTES
Patient identification verified with 2 identifiers.    Location: [facility] at [facility] - [address] [address] [phone] option #1     Referring Physician: Dr Bonds  Enrollment/ Re-enrollment date: 3/10/2026   INR Goal: 2.0-3.0  INR monitoring is per [facility] protocol.  Anticoagulation Medication: warfarin  Indication: Atrial Fibrillation/Atrial Flutter    Subjective   Bleeding signs/symptoms: No    Bruising: No   Major bleeding event: No  Thrombosis signs/symptoms: No  Thromboembolic event: No  Missed doses: No  Extra doses: No  Medication changes: No  Dietary changes: No  Change in health: No  Change in activity: No  Alcohol: No  Other concerns: No    Upcoming Procedures:  Does the Patient Have any upcoming procedures that require interruption in anticoagulation therapy? no  Does the patient require bridging? no      Anticoagulation Summary  As of 5/15/2025      INR goal:  2.0-3.0   TTR:  54.4% (1.6 y)   INR used for dosin.00 (5/15/2025)   Weekly warfarin total:  35 mg               Assessment/Plan   Therapeutic     1. New dose: no change    2. Next INR: 1 month      Education provided to patient during the visit:  Patient instructed to call in interim with questions, concerns and changes.   Patient educated on interactions between medications and warfarin.   Patient educated on dietary consistency in vitamin k consumption.   Patient educated on affects of alcohol consumption while taking warfarin.   Patient educated on signs of bleeding/clotting.   Patient educated on compliance with dosing, follow up appointments, and prescribed plan of care.

## 2025-05-21 ENCOUNTER — APPOINTMENT (OUTPATIENT)
Dept: CARDIOLOGY | Facility: CLINIC | Age: 45
End: 2025-05-21
Payer: MEDICAID

## 2025-05-22 DIAGNOSIS — I42.9 CARDIOMYOPATHY, UNSPECIFIED TYPE (MULTI): ICD-10-CM

## 2025-06-12 ENCOUNTER — OFFICE VISIT (OUTPATIENT)
Dept: CARDIOLOGY | Facility: HOSPITAL | Age: 45
End: 2025-06-12
Payer: MEDICAID

## 2025-06-12 ENCOUNTER — ANTICOAGULATION - WARFARIN VISIT (OUTPATIENT)
Dept: CARDIOLOGY | Facility: CLINIC | Age: 45
End: 2025-06-12
Payer: MEDICAID

## 2025-06-12 VITALS
OXYGEN SATURATION: 94 % | HEIGHT: 64 IN | DIASTOLIC BLOOD PRESSURE: 76 MMHG | HEART RATE: 82 BPM | SYSTOLIC BLOOD PRESSURE: 110 MMHG | BODY MASS INDEX: 31.58 KG/M2 | WEIGHT: 185 LBS

## 2025-06-12 DIAGNOSIS — I50.22 CHRONIC HEART FAILURE WITH REDUCED EJECTION FRACTION (HFREF, <= 40%): Primary | ICD-10-CM

## 2025-06-12 DIAGNOSIS — I42.9 CARDIOMYOPATHY, UNSPECIFIED TYPE (MULTI): ICD-10-CM

## 2025-06-12 DIAGNOSIS — I48.0 PAROXYSMAL ATRIAL FIBRILLATION (MULTI): Primary | ICD-10-CM

## 2025-06-12 LAB
POC INR: 2.7 (ref 0.9–1.1)
POC PROTHROMBIN TIME: ABNORMAL (ref 9.3–12.5)

## 2025-06-12 PROCEDURE — 3078F DIAST BP <80 MM HG: CPT | Performed by: INTERNAL MEDICINE

## 2025-06-12 PROCEDURE — 99211 OFF/OP EST MAY X REQ PHY/QHP: CPT

## 2025-06-12 PROCEDURE — 99214 OFFICE O/P EST MOD 30 MIN: CPT | Performed by: INTERNAL MEDICINE

## 2025-06-12 PROCEDURE — 99213 OFFICE O/P EST LOW 20 MIN: CPT | Performed by: INTERNAL MEDICINE

## 2025-06-12 PROCEDURE — 85610 PROTHROMBIN TIME: CPT | Mod: QW

## 2025-06-12 PROCEDURE — 3052F HG A1C>EQUAL 8.0%<EQUAL 9.0%: CPT | Performed by: INTERNAL MEDICINE

## 2025-06-12 PROCEDURE — 3008F BODY MASS INDEX DOCD: CPT | Performed by: INTERNAL MEDICINE

## 2025-06-12 PROCEDURE — 3050F LDL-C >= 130 MG/DL: CPT | Performed by: INTERNAL MEDICINE

## 2025-06-12 PROCEDURE — 3074F SYST BP LT 130 MM HG: CPT | Performed by: INTERNAL MEDICINE

## 2025-06-12 PROCEDURE — 1036F TOBACCO NON-USER: CPT | Performed by: INTERNAL MEDICINE

## 2025-06-12 RX ORDER — SPIRONOLACTONE 25 MG/1
25 TABLET ORAL DAILY
Qty: 90 TABLET | Refills: 3 | Status: SHIPPED | OUTPATIENT
Start: 2025-06-12 | End: 2026-06-12

## 2025-06-12 NOTE — PROGRESS NOTES
Patient identification verified with 2 identifiers.    Location: [facility] - [address] [address] [phone] option #1     Referring Physician: Dr. Migue Bonds  Enrollment/ Re-enrollment date: 3/10/26   INR Goal: 2.0-3.0  INR monitoring is per [facility] protocol.  Anticoagulation Medication: warfarin  Indication: Atrial Fibrillation/Atrial Flutter    Subjective   Bleeding signs/symptoms: No    Bruising: No   Major bleeding event: No  Thrombosis signs/symptoms: No  Thromboembolic event: No  Missed doses: No  Extra doses: No  Medication changes: No  Dietary changes: No  Change in health: No  Change in activity: No  Alcohol: No  Other concerns: No    Upcoming Procedures:  Does the Patient Have any upcoming procedures that require interruption in anticoagulation therapy? no  Does the patient require bridging? no      Anticoagulation Summary  As of 2025      INR goal:  2.0-3.0   TTR:  56.4% (1.7 y)   INR used for dosin.70 (2025)   Weekly warfarin total:  35 mg               Assessment/Plan   Therapeutic     1. New dose: no change    2. Next INR: 1 month      Education provided to patient during the visit:  Patient instructed to call in interim with questions, concerns and changes.   Patient educated on interactions between medications and warfarin.   Patient educated on dietary consistency in vitamin k consumption.   Patient educated on affects of alcohol consumption while taking warfarin.   Patient educated on signs of bleeding/clotting.   Patient educated on compliance with dosing, follow up appointments, and prescribed plan of care.

## 2025-06-12 NOTE — PATIENT INSTRUCTIONS
To reach Dr. Rodriguez's office please call [phone] ([facility]). Fax [phone]. Call [phone] to schedule an appointment. You may also contact the HF RNs at [email or web address]    Thank you for coming to your appointment today. If you have any questions or need cardiac medication refills, please call the Heart Failure Office at [phone] option 6.     Start taking Entresto  mg twice daily   Start taking Spironolactone 25 mg once daily  Please schedule a follow up appointment in 6 months

## 2025-06-12 NOTE — PROGRESS NOTES
"    Heart Failure Cardiology    Primary cardiologist: Tony Bonds / Iris Nathan    Jayde Daugherty is a 45 y.o. 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 Lives with her three daughters (ages 9, 19, 23).     She is compliant with her GDMT for heart failure. She has bouts of dizziness. Several months ago the neuro team advised her to reduce dosages of Entresto and spironolactone, which she did, but this did not improve her dizziness.    Studies:    Echocardiogram 2/1/2025  1. Left ventricular ejection fraction is severely decreased, calculated by Lopez's biplane at 23%.   2. There is global hypokinesis of the left ventricle with minor regional variations.   3. Spectral Doppler shows a Grade I (impaired relaxation pattern) of left ventricular diastolic filling with normal left atrial filling pressure.   4. Left ventricular cavity size is mildly dilated.   5. There is normal right ventricular global systolic function.   6. Mild to moderate mitral valve regurgitation.   7. Right ventricular systolic pressure is within normal limits.    CPET 3/2025  1. Peak oxygen consumption 14.2 ml/kg/min (55% predicted) or 24.3 corrected for lean body mass.  2. Normal BP response, normal VO2/work rate (10) mildly elevated VE/VCo2 slope (39), EOV was noted.  3. EKG showed sinus rhythm with rare PVCs, non-diagnostic for ischemia due to failure to reach target heart rate.  4. Test was submaximal based on v slope and RER <1.  5. Moderate decrease in exercise capacity but primary limitation does not appear to be cardiac (unable to assess ventilatory limitation given lack of spirometry data).  6. Elevated Ve/vco2 and EOV does suggest pulmonary congestion but no evidence of a severe cardiac limitation based on achieved exercise level.    Exam: /76 (BP Location: Left arm, Patient Position: Sitting)   Pulse 82   Ht 1.626 m (5' 4\")   Wt 83.9 kg (185 lb)   SpO2 94%   " BMI 31.76 kg/m²   No JVD  RRR  CTA  No LE edema    Current Outpatient Medications   Medication Instructions    albuterol 90 mcg/actuation inhaler 2 puffs, Every 6 hours PRN    clopidogrel (PLAVIX) 75 mg, Daily    dapagliflozin propanediol (FARXIGA) 10 mg, oral, Daily    desipramine (NORPRAMIN) 10 mg, Daily    DULoxetine (CYMBALTA) 30 mg, 2 times daily    enoxaparin (Lovenox) 40 mg/0.4 mL syringe One injection twice daily every 12 hours Continue until directed by coumadin clinic with therapeutic INR.    furosemide (LASIX) 40 mg, Daily PRN    gabapentin (NEURONTIN) 200 mg, 2 times daily    metoprolol succinate XL (Toprol-XL) 100 mg 24 hr tablet 1 tablet, Every 12 hours scheduled (0630,1830)    montelukast (SINGULAIR) 10 mg, Daily    rosuvastatin (CRESTOR) 20 mg, oral, Daily    sacubitriL-valsartan (Entresto) 49-51 mg tablet 1 tablet, 2 times daily    sertraline (ZOLOFT) 100 mg, Daily    spironolactone (ALDACTONE) 12.5 mg, oral, Daily    topiramate (TOPAMAX) 50 mg, 2 times daily    Trulicity 1.5 mg, Weekly    warfarin (Coumadin) 5 mg tablet Take 1 tablet (5 mg) by mouth once daily.     Past medical history (non-cardiac):  -- Lupus (not currently on treatment), has had prior lupus flares requiring steroids in past  -- Former smoker (25 year smoking history), quit 2016  -- Hypertension  -- Hyperlipidemia  -- DM2  -- Anxiety and depression  -- Chronic pain syndrome related to fibromyalgia and disk problems in back    Cardiovascular history:  -- Mixed non-ischemic (diagnosed 2005; peripartum) and Ischemic (heart attack in 2019) cardiomyopathy   -- History of anterior STEMI in setting of LV thrombus (now on warfarin, previously seen by Dr. Jayde Juárez)  -- HFrEF, Stage C, NYHA class II   -- No significant CAD  -- Paroxysmal atrial fibrillation  -- ICD    Assessment: 45 y.o. AAF with HFrEF Stage C, on GDMT. Dosages of Entresto and spironolactone were reduced due to complaints of dizziness, though she notes that reducing  dosages did not improve her symptoms in any way. She only takes furosemide intermittently, as needed for swelling in her hands or feet.    Plan:  -- Increase dose of Entresto to 97/103mg BID  -- Increase dose of spironolactone to 25mg every day   -- Visit with me in 6 months; if she has any further decompensations it may be a sign that she is transitioning from Stage C to Stage D heart failure       David Rodrigeuz MD, MPH  Advanced Heart Failure and Transplant Cardiology  [facility]

## 2025-06-19 ENCOUNTER — TELEPHONE (OUTPATIENT)
Dept: CARDIOLOGY | Facility: HOSPITAL | Age: 45
End: 2025-06-19
Payer: MEDICAID

## 2025-06-19 DIAGNOSIS — I24.0 CORONARY THROMBOSIS (MULTI): ICD-10-CM

## 2025-06-19 DIAGNOSIS — I48.0 PAROXYSMAL ATRIAL FIBRILLATION (MULTI): Primary | ICD-10-CM

## 2025-06-19 RX ORDER — WARFARIN SODIUM 5 MG/1
5 TABLET ORAL EVERY EVENING
Qty: 90 TABLET | Refills: 3 | Status: SHIPPED | OUTPATIENT
Start: 2025-06-19 | End: 2026-06-19

## 2025-06-19 NOTE — TELEPHONE ENCOUNTER
Patient called for a refill on her warfarin. She would like it sent to the [facility] in [place].     Thank you.

## 2025-06-30 ENCOUNTER — PROCEDURE VISIT (OUTPATIENT)
Dept: SLEEP MEDICINE | Facility: HOSPITAL | Age: 45
End: 2025-06-30
Payer: MEDICAID

## 2025-06-30 DIAGNOSIS — G47.19 EXCESSIVE DAYTIME SLEEPINESS: ICD-10-CM

## 2025-06-30 DIAGNOSIS — G47.30 SLEEP-RELATED BREATHING DISORDER: ICD-10-CM

## 2025-07-01 VITALS
WEIGHT: 190.92 LBS | SYSTOLIC BLOOD PRESSURE: 162 MMHG | DIASTOLIC BLOOD PRESSURE: 120 MMHG | HEIGHT: 64 IN | HEART RATE: 91 BPM | OXYGEN SATURATION: 98 % | RESPIRATION RATE: 14 BRPM | BODY MASS INDEX: 32.59 KG/M2

## 2025-07-01 ASSESSMENT — SLEEP AND FATIGUE QUESTIONNAIRES
HOW LIKELY ARE YOU TO NOD OFF OR FALL ASLEEP WHEN YOU ARE A PASSENGER IN A CAR FOR AN HOUR WITHOUT A BREAK: MODERATE CHANCE OF DOZING
HOW LIKELY ARE YOU TO NOD OFF OR FALL ASLEEP WHILE SITTING QUIETLY AFTER LUNCH WITHOUT ALCOHOL: WOULD NEVER DOZE
HOW LIKELY ARE YOU TO NOD OFF OR FALL ASLEEP WHILE SITTING AND TALKING TO SOMEONE: WOULD NEVER DOZE
SITING INACTIVE IN A PUBLIC PLACE LIKE A CLASS ROOM OR A MOVIE THEATER: WOULD NEVER DOZE
HOW LIKELY ARE YOU TO NOD OFF OR FALL ASLEEP WHILE SITTING AND READING: WOULD NEVER DOZE
HOW LIKELY ARE YOU TO NOD OFF OR FALL ASLEEP WHILE LYING DOWN TO REST IN THE AFTERNOON WHEN CIRCUMSTANCES PERMIT: HIGH CHANCE OF DOZING
HOW LIKELY ARE YOU TO NOD OFF OR FALL ASLEEP IN A CAR, WHILE STOPPED FOR A FEW MINUTES IN TRAFFIC: WOULD NEVER DOZE
HOW LIKELY ARE YOU TO NOD OFF OR FALL ASLEEP WHILE WATCHING TV: WOULD NEVER DOZE
ESS-CHAD TOTAL SCORE: 5

## 2025-07-01 NOTE — PROGRESS NOTES
[facility] TECH NOTE:     Patient: Jayde Daugherty   MRN//AGE: [id number]  1980  45 y.o.   Technologist: NATHANIEL Diallo   Room: 2   Service Date: 25        Sleep Testing Location: [facility]:     TECHNOLOGIST SLEEP STUDY PROCEDURE NOTE:   This sleep study is being conducted according to the policies and procedures outlined by the AAS accreditation standards.  The sleep study procedure and processes involved during this appointment was explained to the patient/patient’s family, questions were answered. The patient/family verbalized understanding.      The patient is a 45 y.o. year old female scheduled for a Diagnostic PSG Split night. She arrived for her appointment.      The study that was ultimately completed was a Diagnostic PSG Split night .    The full study was completed.  Patient questionnaires completed?: yes     Consents signed? yes    Initial Fall Risk Screening:     Jayde has not fallen in the last 6 months. Jayde does not have a fear of falling. She does not need assistance with sitting, standing, or walking. She does not need assistance walking in her home. She does not need assistance in an unfamiliar setting. The patient is not using an assistive device.     Brief Study observations: Patient did not meet the criteria for a split study.      After the procedure, the patient/family was informed to ensure followup with ordering clinician for testing results.      Technologist: NATHANIEL Diallo

## 2025-07-10 ENCOUNTER — TELEPHONE (OUTPATIENT)
Dept: CARDIOLOGY | Facility: CLINIC | Age: 45
End: 2025-07-10
Payer: MEDICAID

## 2025-07-15 ENCOUNTER — ANTICOAGULATION - WARFARIN VISIT (OUTPATIENT)
Dept: CARDIOLOGY | Facility: CLINIC | Age: 45
End: 2025-07-15
Payer: MEDICAID

## 2025-07-15 ENCOUNTER — OFFICE VISIT (OUTPATIENT)
Dept: CARDIOLOGY | Facility: HOSPITAL | Age: 45
End: 2025-07-15
Payer: MEDICAID

## 2025-07-15 ENCOUNTER — LAB REQUISITION (OUTPATIENT)
Dept: LAB | Facility: HOSPITAL | Age: 45
End: 2025-07-15

## 2025-07-15 VITALS
HEART RATE: 83 BPM | HEIGHT: 64 IN | SYSTOLIC BLOOD PRESSURE: 122 MMHG | DIASTOLIC BLOOD PRESSURE: 78 MMHG | RESPIRATION RATE: 20 BRPM | WEIGHT: 193 LBS | OXYGEN SATURATION: 97 % | BODY MASS INDEX: 32.95 KG/M2

## 2025-07-15 DIAGNOSIS — I48.0 PAROXYSMAL ATRIAL FIBRILLATION (MULTI): Primary | ICD-10-CM

## 2025-07-15 DIAGNOSIS — R07.89 OTHER CHEST PAIN: ICD-10-CM

## 2025-07-15 DIAGNOSIS — I42.9 CARDIOMYOPATHY, UNSPECIFIED TYPE (MULTI): ICD-10-CM

## 2025-07-15 DIAGNOSIS — I48.0 PAROXYSMAL ATRIAL FIBRILLATION (MULTI): ICD-10-CM

## 2025-07-15 DIAGNOSIS — E78.00 PURE HYPERCHOLESTEROLEMIA: ICD-10-CM

## 2025-07-15 DIAGNOSIS — I50.20 HFREF (HEART FAILURE WITH REDUCED EJECTION FRACTION): Primary | ICD-10-CM

## 2025-07-15 LAB
CARDIAC TROPONIN I PNL SERPL HS: 13 NG/L (ref 0–13)
POC INR: 3.5 (ref 0.9–1.1)
POC PROTHROMBIN TIME: ABNORMAL (ref 9.3–12.5)

## 2025-07-15 PROCEDURE — 99211 OFF/OP EST MAY X REQ PHY/QHP: CPT | Performed by: INTERNAL MEDICINE

## 2025-07-15 PROCEDURE — 3074F SYST BP LT 130 MM HG: CPT | Performed by: NURSE PRACTITIONER

## 2025-07-15 PROCEDURE — 3078F DIAST BP <80 MM HG: CPT | Performed by: NURSE PRACTITIONER

## 2025-07-15 PROCEDURE — 85610 PROTHROMBIN TIME: CPT | Mod: QW

## 2025-07-15 PROCEDURE — 99214 OFFICE O/P EST MOD 30 MIN: CPT | Performed by: NURSE PRACTITIONER

## 2025-07-15 PROCEDURE — 93005 ELECTROCARDIOGRAM TRACING: CPT | Performed by: NURSE PRACTITIONER

## 2025-07-15 PROCEDURE — 99213 OFFICE O/P EST LOW 20 MIN: CPT

## 2025-07-15 PROCEDURE — 84484 ASSAY OF TROPONIN QUANT: CPT

## 2025-07-15 PROCEDURE — 3008F BODY MASS INDEX DOCD: CPT | Performed by: NURSE PRACTITIONER

## 2025-07-15 RX ORDER — LANOLIN ALCOHOL/MO/W.PET/CERES
1 CREAM (GRAM) TOPICAL NIGHTLY
COMMUNITY
Start: 2025-04-02

## 2025-07-15 RX ORDER — RIMEGEPANT SULFATE 75 MG/75MG
75 TABLET, ORALLY DISINTEGRATING ORAL
COMMUNITY
Start: 2025-04-02

## 2025-07-15 ASSESSMENT — PAIN SCALES - GENERAL: PAINLEVEL_OUTOF10: 0-NO PAIN

## 2025-07-15 NOTE — PROGRESS NOTES
Primary Care Physician: Arminda Thompson MD  Date of Visit: 07/15/2025  3:00 PM EDT  Location of visit: [facility]     Chief Complaint:   Chief Complaint   Patient presents with    Follow-up        HPI / Summary:   Jayde Daugherty is a 45 y.o. female presents for followup. Seen in collaboration with Dr. Bonds. She had an episode of anterior chest tightness walking from her car into our office today. Resolved upon sitting down. Lasted 5 minutes. No associated symptoms. Did not radiate. She has noted dyspnea on exertion after she showers and walks up a flight of stairs. She otherwise can walk up a flight of stairs any other time of the day without chest pain or dyspnea. She did not take any medications yesterday except Warfarin. She has an occasional lightheadedness that she is attributing to migraines. The patient denies chest pain, palpitations, syncope, orthopnea, paroxysmal nocturnal dyspnea, lower extremity edema, or bleeding problems.      Past Medical History:   Diagnosis Date    Allergic rhinitis     Asthma     Coronary artery disease     Diabetes mellitus (Multi)     Hypertension     Myocardial infarction (Multi)     x 2 with significant apical involvement    Personal history of nicotine dependence 06/10/2019    Former cigarette smoker    Personal history of other diseases of the circulatory system 2014    History of atrial fibrillation    Personal history of other diseases of the respiratory system 2022    History of acute bronchitis    Snoring     Systemic lupus erythematosus, unspecified 2013    Systemic lupus erythematosus        Past Surgical History:   Procedure Laterality Date    CARDIAC CATHETERIZATION      CERVICAL BIOPSY  W/ LOOP ELECTRODE EXCISION      Cervical Loop Electrosurgical Excision (LEEP)     SECTION, LOW TRANSVERSE      HYSTERECTOMY  2014    peripartum, supracervical, at [facility]    INSERT / REPLACE / REMOVE PACEMAKER      MR HEAD  ANGIO WO IV CONTRAST  09/01/2013    MR HEAD ANGIO WO IV CONTRAST 9/1/2013 [facility]    MR NECK ANGIO WO IV CONTRAST  09/01/2013    MR NECK ANGIO WO IV CONTRAST 9/1/2013 [facility]    OTHER SURGICAL HISTORY  09/27/2019    Cardioverter defibrillator insertion    TOTAL HIP ARTHROPLASTY Bilateral 03/11/2014    Hip Replacement          Social History:   reports that she quit smoking about 9 years ago. Her smoking use included cigarettes. She started smoking about 34 years ago. She has a 25 pack-year smoking history. She has never used smokeless tobacco. She reports current alcohol use of about 3.0 standard drinks of alcohol per week. She reports that she does not currently use drugs.     Family History:  family history is not on file.      Allergies:  Allergies   Allergen Reactions    Hay Fever And Allergy Relief Runny nose       Outpatient Medications:  Current Outpatient Medications   Medication Instructions    albuterol 90 mcg/actuation inhaler 2 puffs, Every 6 hours PRN    clopidogrel (PLAVIX) 75 mg, Daily    dapagliflozin propanediol (FARXIGA) 10 mg, oral, Daily    desipramine (NORPRAMIN) 10 mg, Daily    DULoxetine (CYMBALTA) 30 mg, 2 times daily    enoxaparin (Lovenox) 40 mg/0.4 mL syringe One injection twice daily every 12 hours Continue until directed by coumadin clinic with therapeutic INR.    furosemide (LASIX) 40 mg, Daily PRN    gabapentin (NEURONTIN) 200 mg, 2 times daily    magnesium oxide (Mag-Ox) 400 mg (241.3 mg elemental) tablet 1 tablet, Nightly    metoprolol succinate XL (Toprol-XL) 100 mg 24 hr tablet 1 tablet, Every 12 hours scheduled (0630,1830)    montelukast (SINGULAIR) 10 mg, Daily    Nurtec ODT 75 mg    rosuvastatin (CRESTOR) 20 mg, oral, Daily    sacubitriL-valsartan (Entresto)  mg tablet 1 tablet, oral, 2 times daily    sertraline (ZOLOFT) 100 mg, Daily    spironolactone (ALDACTONE) 25 mg, oral, Daily    topiramate (TOPAMAX) 50 mg, 2 times daily    Trulicity 1.5 mg,  "Weekly    warfarin (COUMADIN) 5 mg, oral, Every evening, Take as directed per After Visit Summary.       Physical Exam:  Vitals:    07/15/25 1512   BP: (!) 133/92   BP Location: Right arm   Patient Position: Sitting   BP Cuff Size: Adult   Pulse: 83   Resp: 20   SpO2: 97%   Weight: 87.5 kg (193 lb)   Height: 1.626 m (5' 4\")     Wt Readings from Last 5 Encounters:   07/15/25 87.5 kg (193 lb)   07/01/25 86.6 kg (190 lb 14.7 oz)   06/12/25 83.9 kg (185 lb)   05/07/25 83.9 kg (185 lb)   04/22/25 86 kg (189 lb 8 oz)     Body mass index is 33.13 kg/m².     GENERAL: alert, cooperative, pleasant, in no acute distress  SKIN: warm and dry  NECK: Normal JVD, negative HJR  CARDIAC: Regular rate and rhythm with no rubs, murmurs, or gallops  CHEST: Normal respiratory efforts, lungs clear to auscultation bilaterally.  ABDOMEN: soft, nontender, nondistended  EXTREMITIES: no edema, +2 palpable RP bilaterally       Last Labs:  CMP:  Recent Labs     02/01/25  0441 01/31/25  1207 01/13/25  1026    132* 137   K 3.8 4.3 3.9    102 99   CO2 28 22 30   ANIONGAP 11 12 12   BUN 10 8 10   CREATININE 0.77 0.59 0.76   EGFR >90 >90 >90   GLUCOSE 130* 125* 219*     Recent Labs     02/01/25  0441 01/31/25  1207 01/13/25  1026   ALBUMIN 3.6 3.9 4.2   ALKPHOS 39 44 55   ALT 12 11 14   AST 14 19 14   BILITOT 0.4 0.4 0.6     CBC:  Recent Labs     02/01/25  0441 01/31/25  2258 01/31/25  1207   WBC 2.5* 2.7* 2.4*   HGB 13.3 13.6 14.2   HCT 41.2 41.2 41.6   * 148* 163   MCV 93 91 90     COAG:   Recent Labs     07/15/25  1436 06/12/25  1305 09/23/23  0000 02/15/23  1057 01/05/23  0603 01/04/23  1907   INR 3.50* 2.70*   < >  --    < > 2.4*   DDIMERVTE  --   --   --  459  --  484    < > = values in this interval not displayed.     HEME/ENDO:  Recent Labs     02/18/25  0337 01/31/25  2258 04/02/24  1325 02/15/23  0506 09/29/22  1628 12/21/21  1137 12/17/21  1539   FERRITIN  --   --   --   --   --   --  321*   TSH  --   --  1.77  --  0.82 " 1.55  --    HGBA1C 8.3* 8.9* 8.3*   < > 6.2* 9.0*  --     < > = values in this interval not displayed.      CARDIAC:   Recent Labs     01/31/25  2204 01/31/25  1207 01/13/25  1128 01/13/25  1026 02/14/23  1254 02/14/23  1113   TROPHS  --  7 12 17*   < > 8   *  --   --  314*  --  170*    < > = values in this interval not displayed.     Recent Labs     01/31/25  2206 04/02/24  1325 02/15/23  0506 09/29/22  1628 09/19/22  1013   CHOL 231* 259* 170 276* 204*   LDLF  --   --  97 174* 128*   LDLCALC 148* 161*  --   --   --    HDL 45.5 56.9 47.6 68.3 58.8   TRIG 190* 204* 129 170* 88       Last Cardiology Tests:  ECG:  Obtained and Reviewed EKG from 7/15/25- normal sinus rhythm HR 83 possible left atrial enlargement, LAD, LVH, possible lateral infarct     Echo:  2/1/25 Echocardiogram  CONCLUSIONS:   1. Left ventricular ejection fraction is severely decreased, calculated by Lopez's biplane at 23%.   2. There is global hypokinesis of the left ventricle with minor regional variations.   3. Spectral Doppler shows a Grade I (impaired relaxation pattern) of left ventricular diastolic filling with normal left atrial filling pressure.   4. Left ventricular cavity size is mildly dilated.   5. There is normal right ventricular global systolic function.   6. Mild to moderate mitral valve regurgitation.   7. Right ventricular systolic pressure is within normal limits.       4/17/24  CONCLUSIONS:   1. Left ventricular systolic function is severely decreased with a 25-30% estimated ejection fraction.   2. Apical septal segment, apical inferior segment, and apex are abnormal.   3. Spectral Doppler shows an impaired relaxation pattern of left ventricular diastolic filling.   4. There is mildly reduced right ventricular systolic function.   5. There is global hypokinesis of the left ventricle with minor regional variations.    Cath:  Right heart catheterization January 6, 2023  CONCLUSIONS:   1. Normal left and right heart filling  pressures.   2. No evidence of pulmonary hypertension.   3. Low normal cardiac index with elevated SVR (1498).   4. No evidence of intracardiac shunt.    Cardiac catheterization September 19, 2022  CONCLUSIONS:   1. No significant CAD in a codominant system.    Cardiac catheterization June 2, 2019  Coronary Lesion Summary:  Vessel   Stenosis    Vessel Segment  LAD    100% stenosis proximal to mid  CONCLUSIONS:   1. Successful OCT guided PCI of the distal left main and proximal LAD with mechanical thrombectomy.   2. 100% thormbotic occlusion of proximal-mid LAD with large thrombus burden in distal LM and proximal LAD in a codominant system.   3. Likely embolic vs. insitu thrombosis with otherwise angiographically normal coronary arteries.   4. Elevated LVEDP.   5. No aortic stenosis.   6. Left Ventricular end-diastolic pressure = 35.           Cardiac Imaging:  CT angio of the chest January 4, 2023  FINDINGS:     Pulmonary arteries are adequately opacified without acute or chronic  filling defects.       The heart is normal in size without pericardial effusion.       Thoracic lymph nodes are not enlarged.     There is no pleural effusion, pleural thickening, or pneumothorax.       CTA of head and neck 1/31/25      INTERNAL CAROTID ARTERIES: Normal in course and caliber    IMPRESSION:  Large vessel occlusion of the mid M2 inferior division branch of the  right MCA with symmetric appearance of bilateral distal M3/M4  branches of the inferior divisions of the MCAs.      Poor visualization of the M2 superior division branches of the right  MCA without focal definite occlusion appreciated.      Remaining intracranial vasculature is within normal limits.      Cervical vasculature is unremarkable.      Critical Finding:  See findings. Notification was initiated on  1/31/2025 at 12:28 pm by  Ignacio Ragland.  (**-OCF-*    CT head 2/1/25  IMPRESSION:  No discernible loss of gray-white differentiation in the region of  the  occluded right MCA branch. No acute intracranial hemorrhage or  mass effect.    CT head 1/31/25  IMPRESSION:  Faint skull base arterial calcifications in each carotid siphon.      No acute intracranial bleed or focal mass effect.      Cardiopulmonary stress test 3/25/25    Impression:      1. Peak oxygen consumption 14.2 ml/kg/min (55% predicted) or 24.3 corrected for lean body mass.   2. Normal BP response, normal VO2/work rate (10) mildly elevated VE/VCo2 slope (39), EOV was noted.   3. EKG showed sinus rhythm with rare PVCs, non-diagnostic for ischemia due to failure to reach target heart rate.   4. Test was submaximal based on v slope and RER <1.   5. Moderate decrease in exercise capacity but primary limitation does not appear to be cardiac (unable to assess ventilatory limitation given lack of spirometry data).   6. Elevated Ve/vco2 and EOV does suggest pulmonary congestion but no evidence of a severe cardiac limitation based on achieved exercise level.       Assessment/Plan   Jayde was seen today for follow-up.  Diagnoses and all orders for this visit:  HFrEF (heart failure with reduced ejection fraction) (Primary)  -     Comprehensive metabolic panel; Future  -     Comprehensive metabolic panel  Paroxysmal atrial fibrillation (Multi)  -     ECG 12 lead (Clinic Performed)  -     CBC; Future  -     CBC  Cardiomyopathy, unspecified type (Multi)  -     Comprehensive metabolic panel; Future  -     Comprehensive metabolic panel  Pure hypercholesterolemia  -     Lipid panel; Future  -     TSH with reflex to Free T4 if abnormal; Future  -     Lipid panel  -     TSH with reflex to Free T4 if abnormal  Other chest pain  -     ECG 12 lead (Clinic Performed)  -     Cancel: Troponin I, High Sensitivity; Future  -     Cancel: Troponin I, High Sensitivity  -     Troponin I, High Sensitivity; Future  -     Troponin I, High Sensitivity        In summary Ms. Gm Martinez is a pleasant 45 year-old -American female  with a past medical history significant for anterior ST elevation myocardial infarction in the setting of massive thrombus (embolic versus in situ thrombosis) involving the distal left main and LAD with residual ischemic cardiomyopathy ejection fraction 30-35% status post ICD, paroxysmal atrial fibrillation, hypertension, hyperlipidemia, type II insulin-requiring diabetes while on steroids, lupus, LV thrombus on Warfarin, and prior history of recurrent peripartum cardiomyopathy. She had one single episode of chest pain with exertion that resolve with rest walking into our office today. I did order HS troponin. She will let me know if she has any reoccurring chest pain. It may be related to missing medications yesterday. Her blood pressure is at goal. I have ordered blood work as above. She has noted dyspnea on exertion walking up stairs only after she showers of unclear etiology. She is euvolemic by clinical exam. I suspect may be multifactorial due to deconditioning, heart failure, and obesity. She did just restart Trulicity. I have encouraged her to increase physical activity and weight loss. Recent sleep study showed mild obstructive sleep apnea. She prefers not to do CPAP and will use conservative measures as noted on report. She will follow up with advanced heart failure. She will follow up with Dr. Juárez. She will continue current cardiovascular medications. She will follow up February with Dr. Bonds.     Addendum: 7/16/25- Spoke with patient on the phone today to review blood work results. She has referral for hematologist for evaluation of leukopenia at [facility]. She states today she has been feeling short of breath on exertion more consistently. She took a dose of Furosemide. I have ordered a chest xray. I did instruct her to take Potassium 20 MEQ as needed when she takes Furosemide. I have instructed if her dyspnea were to get worse she should call 911 and go to emergency room. Lipid panel is not  at goal. She thinks she had myalgias on higher dose Rosuvastatin in the past. I added Ezetimibe 10 mg daily. Repeat lipid panel in 3 months. Patient verbalized understanding and agreeable to plan.       Orders:  No orders of the defined types were placed in this encounter.     Followup Appts:  Future Appointments   Date Time Provider Department Center   7/22/2025  1:15 PM [facility] [id number] [id number]   9/30/2025  3:30 PM Jayde Juárez MD [id number] [facility]   12/11/2025  1:40 PM David Rodriguez MD [address]           ____________________________________________________________  Charlene Nathan, APRN-CNP  [facility]  [facility]

## 2025-07-15 NOTE — PROGRESS NOTES
Patient identification verified with 2 identifiers.    Location: [facility] - [address] [address] [phone] option #1     Referring Physician: Dr. Migue Bonds   Enrollment/ Re-enrollment date: 3/10/2026   INR Goal: 2.0-3.0  INR monitoring is per [facility] protocol.  Anticoagulation Medication: warfarin  Indication: Paroxysmal atrial fibrillation    Subjective   Bleeding signs/symptoms: No    Bruising: No   Major bleeding event: No  Thrombosis signs/symptoms: No  Thromboembolic event: No  Missed doses: No  Extra doses: No  Medication changes: Yes  Pt reports starting magnesium 7/12 and Trulicity 7/13, Increased topamax from 50mg to 75mg  Dietary changes: Yes  Pt reports an increase in Orange juice intake this week.  Change in health: No  Change in activity: No  Alcohol: No  Other concerns: No    Upcoming Procedures:  Does the Patient Have any upcoming procedures that require interruption in anticoagulation therapy? no  Does the patient require bridging? no      Anticoagulation Summary  As of 7/15/2025      INR goal:  2.0-3.0   TTR:  55.5% (1.8 y)   INR used for dosing:  3.50 (7/15/2025)   Weekly warfarin total:  32.5 mg               Assessment/Plan   Supratherapeutic     1. New dose: Hold Today's Dose, Decrease Total Weekly Dose.     2. Next INR: 1 week      Education provided to patient during the visit:  Patient instructed to call in interim with questions, concerns and changes.   Patient educated on interactions between medications and warfarin.   Patient educated on compliance with dosing, follow up appointments, and prescribed plan of care.

## 2025-07-15 NOTE — PATIENT INSTRUCTIONS
Continue current meds  Check blood work today CBC, CMP, Lipid panel, TSH  Follow up in February   Call sooner with questions or concerns  If symptoms get worse let me know or if reoccurring chest pain  
brachial vein/left

## 2025-07-16 DIAGNOSIS — E78.00 PURE HYPERCHOLESTEROLEMIA: ICD-10-CM

## 2025-07-16 DIAGNOSIS — R06.02 SHORTNESS OF BREATH: Primary | ICD-10-CM

## 2025-07-16 LAB
ALBUMIN SERPL-MCNC: 3.9 G/DL (ref 3.6–5.1)
ALP SERPL-CCNC: 39 U/L (ref 31–125)
ALT SERPL-CCNC: 10 U/L (ref 6–29)
ANION GAP SERPL CALCULATED.4IONS-SCNC: 8 MMOL/L (CALC) (ref 7–17)
AST SERPL-CCNC: 11 U/L (ref 10–30)
ATRIAL RATE: 83 BPM
BILIRUB SERPL-MCNC: 0.4 MG/DL (ref 0.2–1.2)
BUN SERPL-MCNC: 8 MG/DL (ref 7–25)
CALCIUM SERPL-MCNC: 9 MG/DL (ref 8.6–10.2)
CHLORIDE SERPL-SCNC: 109 MMOL/L (ref 98–110)
CHOLEST SERPL-MCNC: 197 MG/DL
CHOLEST/HDLC SERPL: 3.2 (CALC)
CO2 SERPL-SCNC: 23 MMOL/L (ref 20–32)
CREAT SERPL-MCNC: 0.75 MG/DL (ref 0.5–0.99)
EGFRCR SERPLBLD CKD-EPI 2021: 101 ML/MIN/1.73M2
ERYTHROCYTE [DISTWIDTH] IN BLOOD BY AUTOMATED COUNT: 14.1 % (ref 11–15)
GLUCOSE SERPL-MCNC: 98 MG/DL (ref 65–99)
HCT VFR BLD AUTO: 38.7 % (ref 35–45)
HDLC SERPL-MCNC: 61 MG/DL
HGB BLD-MCNC: 12.7 G/DL (ref 11.7–15.5)
LDLC SERPL CALC-MCNC: 115 MG/DL (CALC)
MCH RBC QN AUTO: 29.8 PG (ref 27–33)
MCHC RBC AUTO-ENTMCNC: 32.8 G/DL (ref 32–36)
MCV RBC AUTO: 90.8 FL (ref 80–100)
NONHDLC SERPL-MCNC: 136 MG/DL (CALC)
P AXIS: 33 DEGREES
P OFFSET: 190 MS
P ONSET: 131 MS
PLATELET # BLD AUTO: 173 THOUSAND/UL (ref 140–400)
PMV BLD REES-ECKER: 10.7 FL (ref 7.5–12.5)
POTASSIUM SERPL-SCNC: 3.6 MMOL/L (ref 3.5–5.3)
PR INTERVAL: 168 MS
PROT SERPL-MCNC: 7.3 G/DL (ref 6.1–8.1)
Q ONSET: 215 MS
QRS COUNT: 14 BEATS
QRS DURATION: 104 MS
QT INTERVAL: 384 MS
QTC CALCULATION(BAZETT): 451 MS
QTC FREDERICIA: 428 MS
R AXIS: -44 DEGREES
RBC # BLD AUTO: 4.26 MILLION/UL (ref 3.8–5.1)
SODIUM SERPL-SCNC: 140 MMOL/L (ref 135–146)
T AXIS: 56 DEGREES
T OFFSET: 407 MS
TRIGL SERPL-MCNC: 100 MG/DL
TSH SERPL-ACNC: 0.78 MIU/L
VENTRICULAR RATE: 83 BPM
WBC # BLD AUTO: 3.3 THOUSAND/UL (ref 3.8–10.8)

## 2025-07-16 RX ORDER — EZETIMIBE 10 MG/1
10 TABLET ORAL DAILY
Qty: 90 TABLET | Refills: 3 | Status: SHIPPED | OUTPATIENT
Start: 2025-07-16 | End: 2026-07-16

## 2025-07-16 RX ORDER — POTASSIUM CHLORIDE 20 MEQ/1
20 TABLET, EXTENDED RELEASE ORAL AS NEEDED
Qty: 30 TABLET | Refills: 1 | Status: SHIPPED | OUTPATIENT
Start: 2025-07-16 | End: 2025-08-15

## 2025-07-18 ENCOUNTER — RESULTS FOLLOW-UP (OUTPATIENT)
Dept: SLEEP MEDICINE | Facility: CLINIC | Age: 45
End: 2025-07-18
Payer: MEDICAID

## 2025-07-18 NOTE — TELEPHONE ENCOUNTER
----- Message from Curtis Cohen sent at 7/15/2025  4:14 PM EDT -----  Please schedule appointment to review sleep study results.  ----- Message -----  From: Jose Garcia - Lab Results In  Sent: 7/12/2025  11:23 PM EDT  To: Curtis Cohen PA-C

## 2025-07-22 ENCOUNTER — HOSPITAL ENCOUNTER (OUTPATIENT)
Dept: RADIOLOGY | Facility: CLINIC | Age: 45
Discharge: HOME | End: 2025-07-22
Payer: MEDICAID

## 2025-07-22 ENCOUNTER — ANTICOAGULATION - WARFARIN VISIT (OUTPATIENT)
Dept: CARDIOLOGY | Facility: CLINIC | Age: 45
End: 2025-07-22
Payer: MEDICAID

## 2025-07-22 DIAGNOSIS — I48.0 PAROXYSMAL ATRIAL FIBRILLATION (MULTI): Primary | ICD-10-CM

## 2025-07-22 DIAGNOSIS — R06.02 SHORTNESS OF BREATH: ICD-10-CM

## 2025-07-22 LAB
POC INR: 1.7 (ref 0.9–1.1)
POC PROTHROMBIN TIME: ABNORMAL (ref 9.3–12.5)

## 2025-07-22 PROCEDURE — 99211 OFF/OP EST MAY X REQ PHY/QHP: CPT | Mod: 25

## 2025-07-22 PROCEDURE — 85610 PROTHROMBIN TIME: CPT | Mod: QW | Performed by: INTERNAL MEDICINE

## 2025-07-22 PROCEDURE — 71046 X-RAY EXAM CHEST 2 VIEWS: CPT | Performed by: RADIOLOGY

## 2025-07-22 PROCEDURE — 71046 X-RAY EXAM CHEST 2 VIEWS: CPT

## 2025-07-22 NOTE — PROGRESS NOTES
Patient identification verified with 2 identifiers.    Location: [facility] at [facility] - [address] [address] [phone] option #1     Referring Physician: DR. BRIDGETTE MCDANIEL  Enrollment/ Re-enrollment date: 3/10/2026   INR Goal: 2.0-3.0  INR monitoring is per [facility] protocol.  Anticoagulation Medication: warfarin  Indication: Atrial Fibrillation/Atrial Flutter    Subjective   Bleeding signs/symptoms: No    Bruising: No   Major bleeding event: No  Thrombosis signs/symptoms: No  Thromboembolic event: No  Missed doses: No  Extra doses: No  Medication changes: Yes  PT TO START ZETIA IN THE NEXT COUPLE OF DAYS  Dietary changes: Yes  PT WOULD LIKE TO DRINK SOME ORANGE A FEW TIMES PER WEEK AND NOT EVERY DAY LIKE SHE WAS PREVIOUSLY  Change in health: No  Change in activity: No  Alcohol: No  Other concerns: No    Upcoming Procedures:  Does the Patient Have any upcoming procedures that require interruption in anticoagulation therapy? no  Does the patient require bridging? no      Anticoagulation Summary  As of 2025      INR goal:  2.0-3.0   TTR:  55.5% (1.8 y)   INR used for dosin.70 (2025)   Weekly warfarin total:  35 mg               Assessment/Plan   Subtherapeutic     1. New dose: TWD INCREASED    2. Next INR: 1 week      Education provided to patient during the visit:  Patient instructed to call in interim with questions, concerns and changes.   Patient educated on compliance with dosing, follow up appointments, and prescribed plan of care.

## 2025-07-29 ENCOUNTER — ANTICOAGULATION - WARFARIN VISIT (OUTPATIENT)
Dept: CARDIOLOGY | Facility: CLINIC | Age: 45
End: 2025-07-29
Payer: MEDICAID

## 2025-07-29 DIAGNOSIS — I48.0 PAROXYSMAL ATRIAL FIBRILLATION (MULTI): Primary | ICD-10-CM

## 2025-07-29 LAB
POC INR: 2 (ref 0.9–1.1)
POC PROTHROMBIN TIME: NORMAL (ref 9.3–12.5)

## 2025-07-29 PROCEDURE — 99211 OFF/OP EST MAY X REQ PHY/QHP: CPT | Performed by: INTERNAL MEDICINE

## 2025-07-29 PROCEDURE — 85610 PROTHROMBIN TIME: CPT | Mod: QW | Performed by: INTERNAL MEDICINE

## 2025-07-29 NOTE — PROGRESS NOTES
Patient identification verified with 2 identifiers.    Location: [facility] - [address] [address] [phone] option #1     Referring Physician: Dr. Migue Bonds   Enrollment/ Re-enrollment date: 03/10/2026   INR Goal: 2.0-3.0  INR monitoring is per [facility] protocol.  Anticoagulation Medication: warfarin  Indication: Paroxysmal atrial fibrillation    Subjective   Bleeding signs/symptoms: No    Bruising: No   Major bleeding event: No  Thrombosis signs/symptoms: No  Thromboembolic event: No  Missed doses: No  Extra doses: No  Medication changes: Yes  Pt reported stopped taking antipsychotic meds  Duloxitine 60mg, Setraline 100mg, desipramine 10mg but resumed on   Dietary changes: Yes  Pt reports will start drinking orange juice on a regular basis starting this week.  Change in health: No  Change in activity: No  Alcohol: No  Other concerns: No    Upcoming Procedures:  Does the Patient Have any upcoming procedures that require interruption in anticoagulation therapy? no  Does the patient require bridging? no      Anticoagulation Summary  As of 2025      INR goal:  2.0-3.0   TTR:  54.9% (1.8 y)   INR used for dosin.00 (2025)   Weekly warfarin total:  35 mg               Assessment/Plan   Therapeutic     1. New dose: no change    2. Next INR: 1 week      Education provided to patient during the visit:  Patient instructed to call in interim with questions, concerns and changes.   Patient educated on interactions between medications and warfarin.   Patient educated on dietary consistency in vitamin k consumption.   Patient educated on signs of bleeding/clotting.   Patient educated on compliance with dosing, follow up appointments, and prescribed plan of care.

## 2025-08-05 ENCOUNTER — TELEPHONE (OUTPATIENT)
Dept: CARDIOLOGY | Facility: CLINIC | Age: 45
End: 2025-08-05
Payer: MEDICAID

## 2025-08-08 ENCOUNTER — ANTICOAGULATION - WARFARIN VISIT (OUTPATIENT)
Dept: CARDIOLOGY | Facility: CLINIC | Age: 45
End: 2025-08-08
Payer: MEDICAID

## 2025-08-08 DIAGNOSIS — I48.0 PAROXYSMAL ATRIAL FIBRILLATION (MULTI): Primary | ICD-10-CM

## 2025-08-12 ENCOUNTER — ANTICOAGULATION - WARFARIN VISIT (OUTPATIENT)
Dept: CARDIOLOGY | Facility: CLINIC | Age: 45
End: 2025-08-12
Payer: MEDICAID

## 2025-08-12 DIAGNOSIS — I48.0 PAROXYSMAL ATRIAL FIBRILLATION (MULTI): Primary | ICD-10-CM

## 2025-08-12 LAB
POC INR: 3.1 (ref 0.9–1.1)
POC PROTHROMBIN TIME: ABNORMAL (ref 9.3–12.5)

## 2025-08-12 PROCEDURE — 85610 PROTHROMBIN TIME: CPT | Mod: QW | Performed by: INTERNAL MEDICINE

## 2025-08-12 PROCEDURE — 99211 OFF/OP EST MAY X REQ PHY/QHP: CPT

## 2025-08-19 ENCOUNTER — ANTICOAGULATION - WARFARIN VISIT (OUTPATIENT)
Dept: CARDIOLOGY | Facility: CLINIC | Age: 45
End: 2025-08-19
Payer: MEDICAID

## 2025-08-19 DIAGNOSIS — I48.0 PAROXYSMAL ATRIAL FIBRILLATION (MULTI): Primary | ICD-10-CM

## 2025-08-23 ENCOUNTER — APPOINTMENT (OUTPATIENT)
Dept: CARDIOLOGY | Facility: CLINIC | Age: 45
End: 2025-08-23
Payer: MEDICAID

## 2025-08-29 ENCOUNTER — ANTICOAGULATION - WARFARIN VISIT (OUTPATIENT)
Dept: CARDIOLOGY | Facility: CLINIC | Age: 45
End: 2025-08-29
Payer: MEDICAID

## 2025-08-29 ENCOUNTER — APPOINTMENT (OUTPATIENT)
Dept: RADIOLOGY | Facility: HOSPITAL | Age: 45
End: 2025-08-29
Payer: MEDICAID

## 2025-08-29 ENCOUNTER — HOSPITAL ENCOUNTER (INPATIENT)
Facility: HOSPITAL | Age: 45
LOS: 2 days | Discharge: HOME | End: 2025-08-31
Attending: EMERGENCY MEDICINE | Admitting: INTERNAL MEDICINE
Payer: MEDICAID

## 2025-08-29 ENCOUNTER — APPOINTMENT (OUTPATIENT)
Dept: CARDIOLOGY | Facility: CLINIC | Age: 45
End: 2025-08-29
Payer: MEDICAID

## 2025-08-29 ENCOUNTER — PATIENT MESSAGE (OUTPATIENT)
Dept: CARDIOLOGY | Facility: HOSPITAL | Age: 45
End: 2025-08-29
Payer: MEDICAID

## 2025-08-29 ENCOUNTER — APPOINTMENT (OUTPATIENT)
Dept: CARDIOLOGY | Facility: HOSPITAL | Age: 45
End: 2025-08-29
Payer: MEDICAID

## 2025-08-29 DIAGNOSIS — I48.0 PAROXYSMAL ATRIAL FIBRILLATION (MULTI): Primary | ICD-10-CM

## 2025-08-29 PROBLEM — R06.02 SHORTNESS OF BREATH: Status: ACTIVE | Noted: 2025-08-29

## 2025-08-29 PROCEDURE — 71045 X-RAY EXAM CHEST 1 VIEW: CPT

## 2025-08-29 PROCEDURE — 71275 CT ANGIOGRAPHY CHEST: CPT

## 2025-08-29 PROCEDURE — 93971 EXTREMITY STUDY: CPT

## 2025-08-29 PROCEDURE — 93005 ELECTROCARDIOGRAM TRACING: CPT

## 2025-08-29 ASSESSMENT — PAIN DESCRIPTION - LOCATION: LOCATION: CHEST

## 2025-08-29 ASSESSMENT — PAIN DESCRIPTION - PAIN TYPE: TYPE: ACUTE PAIN

## 2025-08-29 ASSESSMENT — PAIN SCALES - GENERAL: PAINLEVEL_OUTOF10: 7

## 2025-08-29 ASSESSMENT — PAIN DESCRIPTION - DESCRIPTORS: DESCRIPTORS: TIGHTNESS

## 2025-08-29 ASSESSMENT — PAIN - FUNCTIONAL ASSESSMENT: PAIN_FUNCTIONAL_ASSESSMENT: 0-10

## 2025-08-30 SDOH — ECONOMIC STABILITY: FOOD INSECURITY: WITHIN THE PAST 12 MONTHS, THE FOOD YOU BOUGHT JUST DIDN'T LAST AND YOU DIDN'T HAVE MONEY TO GET MORE.: NEVER TRUE

## 2025-08-30 SDOH — SOCIAL STABILITY: SOCIAL INSECURITY: DO YOU FEEL UNSAFE GOING BACK TO THE PLACE WHERE YOU ARE LIVING?: NO

## 2025-08-30 SDOH — ECONOMIC STABILITY: INCOME INSECURITY: IN THE PAST 12 MONTHS HAS THE ELECTRIC, GAS, OIL, OR WATER COMPANY THREATENED TO SHUT OFF SERVICES IN YOUR HOME?: NO

## 2025-08-30 SDOH — SOCIAL STABILITY: SOCIAL INSECURITY: WITHIN THE LAST YEAR, HAVE YOU BEEN AFRAID OF YOUR PARTNER OR EX-PARTNER?: NO

## 2025-08-30 SDOH — ECONOMIC STABILITY: HOUSING INSECURITY: AT ANY TIME IN THE PAST 12 MONTHS, WERE YOU HOMELESS OR LIVING IN A SHELTER (INCLUDING NOW)?: NO

## 2025-08-30 SDOH — SOCIAL STABILITY: SOCIAL INSECURITY: ARE THERE ANY APPARENT SIGNS OF INJURIES/BEHAVIORS THAT COULD BE RELATED TO ABUSE/NEGLECT?: NO

## 2025-08-30 SDOH — ECONOMIC STABILITY: HOUSING INSECURITY: IN THE PAST 12 MONTHS, HOW MANY TIMES HAVE YOU MOVED WHERE YOU WERE LIVING?: 0

## 2025-08-30 SDOH — SOCIAL STABILITY: SOCIAL INSECURITY: HAVE YOU HAD ANY THOUGHTS OF HARMING ANYONE ELSE?: NO

## 2025-08-30 SDOH — SOCIAL STABILITY: SOCIAL INSECURITY: DOES ANYONE TRY TO KEEP YOU FROM HAVING/CONTACTING OTHER FRIENDS OR DOING THINGS OUTSIDE YOUR HOME?: NO

## 2025-08-30 SDOH — SOCIAL STABILITY: SOCIAL INSECURITY: WITHIN THE LAST YEAR, HAVE YOU BEEN HUMILIATED OR EMOTIONALLY ABUSED IN OTHER WAYS BY YOUR PARTNER OR EX-PARTNER?: NO

## 2025-08-30 SDOH — SOCIAL STABILITY: SOCIAL INSECURITY: ARE YOU OR HAVE YOU BEEN THREATENED OR ABUSED PHYSICALLY, EMOTIONALLY, OR SEXUALLY BY ANYONE?: NO

## 2025-08-30 SDOH — SOCIAL STABILITY: SOCIAL INSECURITY: WERE YOU ABLE TO COMPLETE ALL THE BEHAVIORAL HEALTH SCREENINGS?: YES

## 2025-08-30 SDOH — ECONOMIC STABILITY: HOUSING INSECURITY: IN THE LAST 12 MONTHS, WAS THERE A TIME WHEN YOU WERE NOT ABLE TO PAY THE MORTGAGE OR RENT ON TIME?: NO

## 2025-08-30 SDOH — ECONOMIC STABILITY: FOOD INSECURITY: WITHIN THE PAST 12 MONTHS, YOU WORRIED THAT YOUR FOOD WOULD RUN OUT BEFORE YOU GOT THE MONEY TO BUY MORE.: NEVER TRUE

## 2025-08-30 SDOH — ECONOMIC STABILITY: FOOD INSECURITY: HOW HARD IS IT FOR YOU TO PAY FOR THE VERY BASICS LIKE FOOD, HOUSING, MEDICAL CARE, AND HEATING?: NOT HARD AT ALL

## 2025-08-30 SDOH — SOCIAL STABILITY: SOCIAL INSECURITY: HAS ANYONE EVER THREATENED TO HURT YOUR FAMILY OR YOUR PETS?: NO

## 2025-08-30 SDOH — SOCIAL STABILITY: SOCIAL INSECURITY: DO YOU FEEL ANYONE HAS EXPLOITED OR TAKEN ADVANTAGE OF YOU FINANCIALLY OR OF YOUR PERSONAL PROPERTY?: NO

## 2025-08-30 SDOH — ECONOMIC STABILITY: TRANSPORTATION INSECURITY: IN THE PAST 12 MONTHS, HAS LACK OF TRANSPORTATION KEPT YOU FROM MEDICAL APPOINTMENTS OR FROM GETTING MEDICATIONS?: NO

## 2025-08-30 SDOH — SOCIAL STABILITY: SOCIAL INSECURITY: HAVE YOU HAD THOUGHTS OF HARMING ANYONE ELSE?: NO

## 2025-08-30 ASSESSMENT — LIFESTYLE VARIABLES
SKIP TO QUESTIONS 9-10: 0
AUDIT-C TOTAL SCORE: 3
HOW OFTEN DO YOU HAVE 6 OR MORE DRINKS ON ONE OCCASION: NEVER
HOW OFTEN DO YOU HAVE A DRINK CONTAINING ALCOHOL: 2-4 TIMES A MONTH
AUDIT-C TOTAL SCORE: 3
HOW MANY STANDARD DRINKS CONTAINING ALCOHOL DO YOU HAVE ON A TYPICAL DAY: 3 OR 4

## 2025-08-30 ASSESSMENT — COGNITIVE AND FUNCTIONAL STATUS - GENERAL
MOBILITY SCORE: 24
DAILY ACTIVITIY SCORE: 24
PATIENT BASELINE BEDBOUND: NO
DAILY ACTIVITIY SCORE: 24
MOBILITY SCORE: 24

## 2025-08-30 ASSESSMENT — ACTIVITIES OF DAILY LIVING (ADL)
TOILETING: INDEPENDENT
GROOMING: INDEPENDENT
WALKS IN HOME: INDEPENDENT
ASSISTIVE_DEVICE: EYEGLASSES
HEARING - LEFT EAR: FUNCTIONAL
HEARING - RIGHT EAR: FUNCTIONAL
ADEQUATE_TO_COMPLETE_ADL: YES
DRESSING YOURSELF: INDEPENDENT
LACK_OF_TRANSPORTATION: NO
JUDGMENT_ADEQUATE_SAFELY_COMPLETE_DAILY_ACTIVITIES: YES
PATIENT'S MEMORY ADEQUATE TO SAFELY COMPLETE DAILY ACTIVITIES?: YES
BATHING: INDEPENDENT
FEEDING YOURSELF: INDEPENDENT

## 2025-08-30 ASSESSMENT — PAIN SCALES - GENERAL
PAINLEVEL_OUTOF10: 0 - NO PAIN
PAINLEVEL_OUTOF10: 2
PAINLEVEL_OUTOF10: 0 - NO PAIN
PAINLEVEL_OUTOF10: 0 - NO PAIN

## 2025-08-30 ASSESSMENT — PATIENT HEALTH QUESTIONNAIRE - PHQ9
SUM OF ALL RESPONSES TO PHQ9 QUESTIONS 1 & 2: 0
1. LITTLE INTEREST OR PLEASURE IN DOING THINGS: NOT AT ALL
2. FEELING DOWN, DEPRESSED OR HOPELESS: NOT AT ALL

## 2025-08-30 ASSESSMENT — PAIN - FUNCTIONAL ASSESSMENT
PAIN_FUNCTIONAL_ASSESSMENT: 0-10

## 2025-08-30 ASSESSMENT — PAIN DESCRIPTION - LOCATION: LOCATION: LEG

## 2025-08-30 ASSESSMENT — PAIN DESCRIPTION - ORIENTATION: ORIENTATION: RIGHT;LEFT

## 2025-08-30 ASSESSMENT — PAIN DESCRIPTION - DESCRIPTORS: DESCRIPTORS: ACHING

## 2025-08-31 ASSESSMENT — PAIN - FUNCTIONAL ASSESSMENT
PAIN_FUNCTIONAL_ASSESSMENT: 0-10
PAIN_FUNCTIONAL_ASSESSMENT: 0-10

## 2025-08-31 ASSESSMENT — COGNITIVE AND FUNCTIONAL STATUS - GENERAL
MOBILITY SCORE: 24
DAILY ACTIVITIY SCORE: 24

## 2025-08-31 ASSESSMENT — PAIN SCALES - GENERAL
PAINLEVEL_OUTOF10: 0 - NO PAIN
PAINLEVEL_OUTOF10: 0 - NO PAIN

## 2025-08-31 ASSESSMENT — ACTIVITIES OF DAILY LIVING (ADL): LACK_OF_TRANSPORTATION: NO

## 2025-09-02 ENCOUNTER — PATIENT OUTREACH (OUTPATIENT)
Dept: CARE COORDINATION | Facility: CLINIC | Age: 45
End: 2025-09-02
Payer: MEDICAID

## 2025-09-04 ENCOUNTER — TELEPHONE (OUTPATIENT)
Dept: CARDIOLOGY | Facility: CLINIC | Age: 45
End: 2025-09-04

## 2025-09-04 ENCOUNTER — ANTICOAGULATION - WARFARIN VISIT (OUTPATIENT)
Dept: CARDIOLOGY | Facility: CLINIC | Age: 45
End: 2025-09-04
Payer: MEDICAID

## 2025-09-04 DIAGNOSIS — I48.0 PAROXYSMAL ATRIAL FIBRILLATION (MULTI): Primary | ICD-10-CM

## 2025-09-04 LAB
POC INR: 2.6 (ref 0.9–1.1)
POC PROTHROMBIN TIME: ABNORMAL (ref 9.3–12.5)

## 2025-09-04 PROCEDURE — 99211 OFF/OP EST MAY X REQ PHY/QHP: CPT

## 2025-09-04 PROCEDURE — 85610 PROTHROMBIN TIME: CPT | Mod: QW | Performed by: INTERNAL MEDICINE
